# Patient Record
Sex: MALE | Race: WHITE | NOT HISPANIC OR LATINO
[De-identification: names, ages, dates, MRNs, and addresses within clinical notes are randomized per-mention and may not be internally consistent; named-entity substitution may affect disease eponyms.]

---

## 2017-01-12 ENCOUNTER — APPOINTMENT (OUTPATIENT)
Dept: PAIN MANAGEMENT | Facility: CLINIC | Age: 52
End: 2017-01-12

## 2017-01-12 VITALS
HEIGHT: 74 IN | DIASTOLIC BLOOD PRESSURE: 90 MMHG | WEIGHT: 200 LBS | HEART RATE: 66 BPM | SYSTOLIC BLOOD PRESSURE: 154 MMHG | BODY MASS INDEX: 25.67 KG/M2

## 2017-02-13 ENCOUNTER — APPOINTMENT (OUTPATIENT)
Dept: PAIN MANAGEMENT | Facility: CLINIC | Age: 52
End: 2017-02-13

## 2017-02-13 VITALS
HEIGHT: 74 IN | SYSTOLIC BLOOD PRESSURE: 142 MMHG | BODY MASS INDEX: 25.67 KG/M2 | HEART RATE: 73 BPM | WEIGHT: 200 LBS | DIASTOLIC BLOOD PRESSURE: 80 MMHG

## 2017-03-01 ENCOUNTER — APPOINTMENT (OUTPATIENT)
Dept: NEUROLOGY | Facility: CLINIC | Age: 52
End: 2017-03-01

## 2017-03-01 VITALS
HEIGHT: 74 IN | SYSTOLIC BLOOD PRESSURE: 143 MMHG | DIASTOLIC BLOOD PRESSURE: 80 MMHG | HEART RATE: 66 BPM | WEIGHT: 195 LBS | BODY MASS INDEX: 25.03 KG/M2

## 2017-03-28 ENCOUNTER — APPOINTMENT (OUTPATIENT)
Dept: PAIN MANAGEMENT | Facility: CLINIC | Age: 52
End: 2017-03-28

## 2017-03-28 VITALS
BODY MASS INDEX: 25.03 KG/M2 | WEIGHT: 195 LBS | DIASTOLIC BLOOD PRESSURE: 74 MMHG | HEART RATE: 64 BPM | SYSTOLIC BLOOD PRESSURE: 134 MMHG | HEIGHT: 74 IN

## 2017-03-28 RX ORDER — NABUMETONE 750 MG/1
750 TABLET, FILM COATED ORAL
Qty: 60 | Refills: 1 | Status: DISCONTINUED | COMMUNITY
Start: 2017-01-12 | End: 2017-03-28

## 2017-03-28 RX ORDER — ASPIRIN ENTERIC COATED TABLETS 81 MG 81 MG/1
81 TABLET, DELAYED RELEASE ORAL
Refills: 0 | Status: ACTIVE | COMMUNITY

## 2017-05-15 ENCOUNTER — APPOINTMENT (OUTPATIENT)
Dept: NEUROLOGY | Facility: CLINIC | Age: 52
End: 2017-05-15

## 2017-05-15 VITALS
SYSTOLIC BLOOD PRESSURE: 137 MMHG | HEART RATE: 69 BPM | WEIGHT: 195 LBS | BODY MASS INDEX: 25.03 KG/M2 | DIASTOLIC BLOOD PRESSURE: 83 MMHG | HEIGHT: 74 IN

## 2017-05-15 RX ORDER — PREGABALIN 50 MG/1
50 CAPSULE ORAL
Qty: 120 | Refills: 3 | Status: DISCONTINUED | COMMUNITY
Start: 2017-03-01 | End: 2017-05-15

## 2017-06-05 ENCOUNTER — APPOINTMENT (OUTPATIENT)
Dept: PAIN MANAGEMENT | Facility: CLINIC | Age: 52
End: 2017-06-05

## 2017-06-05 VITALS
SYSTOLIC BLOOD PRESSURE: 113 MMHG | DIASTOLIC BLOOD PRESSURE: 74 MMHG | HEIGHT: 74 IN | WEIGHT: 205 LBS | BODY MASS INDEX: 26.31 KG/M2 | HEART RATE: 73 BPM

## 2017-06-08 ENCOUNTER — OTHER (OUTPATIENT)
Age: 52
End: 2017-06-08

## 2017-07-06 ENCOUNTER — MEDICATION RENEWAL (OUTPATIENT)
Age: 52
End: 2017-07-06

## 2017-07-18 ENCOUNTER — EMERGENCY (EMERGENCY)
Facility: HOSPITAL | Age: 52
LOS: 1 days | Discharge: HOME | End: 2017-07-18
Attending: EMERGENCY MEDICINE
Payer: MEDICAID

## 2017-07-18 VITALS
HEIGHT: 74 IN | RESPIRATION RATE: 20 BRPM | HEART RATE: 93 BPM | DIASTOLIC BLOOD PRESSURE: 69 MMHG | TEMPERATURE: 99 F | WEIGHT: 199.96 LBS | SYSTOLIC BLOOD PRESSURE: 159 MMHG | OXYGEN SATURATION: 98 %

## 2017-07-18 VITALS
RESPIRATION RATE: 18 BRPM | OXYGEN SATURATION: 99 % | DIASTOLIC BLOOD PRESSURE: 76 MMHG | TEMPERATURE: 98 F | SYSTOLIC BLOOD PRESSURE: 123 MMHG

## 2017-07-18 DIAGNOSIS — F41.9 ANXIETY DISORDER, UNSPECIFIED: ICD-10-CM

## 2017-07-18 DIAGNOSIS — Z98.89 OTHER SPECIFIED POSTPROCEDURAL STATES: Chronic | ICD-10-CM

## 2017-07-18 PROCEDURE — 99284 EMERGENCY DEPT VISIT MOD MDM: CPT

## 2017-07-18 PROCEDURE — 99285 EMERGENCY DEPT VISIT HI MDM: CPT

## 2017-07-18 NOTE — ED BEHAVIORAL HEALTH ASSESSMENT NOTE - RISK ASSESSMENT
low overall acute risk. while mother's illness and ongoing occupational stressors exist, pt is future oriented, in treatment with a therapist, denies suicidal ideation/ homicidal ideation, no prior attempts, no substance use

## 2017-07-18 NOTE — ED PROVIDER NOTE - MEDICAL DECISION MAKING DETAILS
Dr. Andrade (Attending Physician)  patient. with ho anxiety pw anxiety about mothers recent pancreatic ca diagnosis. denies si, hi.  decreased sleep recently. no drug or etoh abuse.  Will consult psych and reassess.

## 2017-07-18 NOTE — ED ADULT NURSE NOTE - CHPI ED SYMPTOMS NEG
no confusion/no homicidal/no change in level of consciousness/no hallucinations/no weakness/no disorientation/no agitation/no weight loss/no paranoia/no suicidal
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
## 2017-07-18 NOTE — ED PROVIDER NOTE - ATTENDING CONTRIBUTION TO CARE
Dr. Andrade (Attending Physician)  I performed a history and physical exam of the patient and discussed their management with the advanced care provider. I reviewed the advanced care provider's note and agree with the documented findings and plan of care. My medical decision making and objective findings are found above.

## 2017-07-18 NOTE — ED PROVIDER NOTE - PSYCHIATRIC, MLM
Alert and oriented to person, place, time/situation. normal mood and affect. no apparent risk to self or others.  Pt becomes upset when he speaks about his mother and cries.

## 2017-07-18 NOTE — ED ADULT NURSE NOTE - PMH
Benign Hypertension    Cervical disc disease with myelopathy    Cervical disc disorder with radiculopathy    Cervical disc displacement    Depressive disorder, not elsewhere classified    Diabetes    Hypercholesteremia    Seizure  5 years  Type II or unspecified type diabetes mellitus without mention of complication, not stated as uncontr

## 2017-07-18 NOTE — ED PROVIDER NOTE - OBJECTIVE STATEMENT
51 y.o. male coming in with feeling upset that his mother has recently been diagnosed with pancreatic cancer.  No primary psych diagnosis, no hospitalizations for psych issues in the past.  History of a seizure disorder, back and neck pain issues but otherwise healthy.  No SI, no HI, no hallucinations.

## 2017-07-18 NOTE — ED BEHAVIORAL HEALTH ASSESSMENT NOTE - SUICIDE PROTECTIVE FACTORS
Positive therapeutic relationships/Responsibility to family and others/Identifies reasons for living/Supportive social network or family/Future oriented

## 2017-07-18 NOTE — ED BEHAVIORAL HEALTH ASSESSMENT NOTE - DESCRIPTION
Benign Hypertension, Cervical disk disease, Seizure disorder, DM calm and cooperative. very polite. no events. see hpi

## 2017-07-18 NOTE — ED BEHAVIORAL HEALTH ASSESSMENT NOTE - SUMMARY
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   on exam, while anxious, pt is not presenting as depressed. he is without any suicidal ideations, intent or plan; he is not homicidal, manic, or psychotic. he appears to be exhibiting an appropriate response to current stressors. pt is not an acute danger to self or others, and does not require inpatient hospitalization at this time.

## 2017-07-18 NOTE — ED PROVIDER NOTE - PROGRESS NOTE DETAILS
Called psych resident for consult, explained the case and he does not believe this is an emergent psych case.  BS

## 2017-07-18 NOTE — ED ADULT NURSE NOTE - OBJECTIVE STATEMENT
Patient brought in by ems after patient called worried about having a seizure. Patient has a history of seizure disorder and dm  Patient has been calm and in good control Patient denies an si hi . Patient has no past psych admissions Patient wanded by security and put into gown  All valuable taken by staff Wating to be seen by medicine.Patient reports increased anxiety because mom is sick

## 2017-07-27 ENCOUNTER — APPOINTMENT (OUTPATIENT)
Dept: NEUROLOGY | Facility: CLINIC | Age: 52
End: 2017-07-27

## 2017-08-07 ENCOUNTER — APPOINTMENT (OUTPATIENT)
Dept: NEUROLOGY | Facility: CLINIC | Age: 52
End: 2017-08-07
Payer: MEDICAID

## 2017-08-07 VITALS
HEIGHT: 74 IN | DIASTOLIC BLOOD PRESSURE: 68 MMHG | WEIGHT: 205 LBS | BODY MASS INDEX: 26.31 KG/M2 | SYSTOLIC BLOOD PRESSURE: 126 MMHG | HEART RATE: 60 BPM

## 2017-08-07 PROCEDURE — 99214 OFFICE O/P EST MOD 30 MIN: CPT

## 2017-08-07 RX ORDER — LAMOTRIGINE 25 MG/1
25 TABLET ORAL
Qty: 60 | Refills: 1 | Status: DISCONTINUED | COMMUNITY
Start: 2017-05-15 | End: 2017-08-07

## 2017-08-10 ENCOUNTER — APPOINTMENT (OUTPATIENT)
Dept: NEUROLOGY | Facility: CLINIC | Age: 52
End: 2017-08-10

## 2017-08-17 ENCOUNTER — OTHER (OUTPATIENT)
Age: 52
End: 2017-08-17

## 2017-08-25 ENCOUNTER — APPOINTMENT (OUTPATIENT)
Dept: PAIN MANAGEMENT | Facility: CLINIC | Age: 52
End: 2017-08-25

## 2017-09-14 ENCOUNTER — APPOINTMENT (OUTPATIENT)
Dept: NEUROLOGY | Facility: CLINIC | Age: 52
End: 2017-09-14

## 2017-11-07 ENCOUNTER — APPOINTMENT (OUTPATIENT)
Dept: NEUROLOGY | Facility: CLINIC | Age: 52
End: 2017-11-07

## 2018-01-02 ENCOUNTER — MEDICATION RENEWAL (OUTPATIENT)
Age: 53
End: 2018-01-02

## 2018-01-05 ENCOUNTER — APPOINTMENT (OUTPATIENT)
Dept: NEUROLOGY | Facility: CLINIC | Age: 53
End: 2018-01-05

## 2018-01-19 ENCOUNTER — APPOINTMENT (OUTPATIENT)
Dept: NEUROLOGY | Facility: CLINIC | Age: 53
End: 2018-01-19
Payer: MEDICAID

## 2018-01-19 VITALS — DIASTOLIC BLOOD PRESSURE: 89 MMHG | HEART RATE: 66 BPM | SYSTOLIC BLOOD PRESSURE: 168 MMHG | HEIGHT: 74 IN

## 2018-01-19 VITALS — SYSTOLIC BLOOD PRESSURE: 168 MMHG | DIASTOLIC BLOOD PRESSURE: 89 MMHG | HEART RATE: 66 BPM

## 2018-01-19 PROCEDURE — 99215 OFFICE O/P EST HI 40 MIN: CPT

## 2018-01-25 ENCOUNTER — OTHER (OUTPATIENT)
Age: 53
End: 2018-01-25

## 2018-02-01 ENCOUNTER — OTHER (OUTPATIENT)
Age: 53
End: 2018-02-01

## 2018-03-01 ENCOUNTER — APPOINTMENT (OUTPATIENT)
Dept: NEUROLOGY | Facility: CLINIC | Age: 53
End: 2018-03-01
Payer: MEDICAID

## 2018-03-01 PROCEDURE — 95886 MUSC TEST DONE W/N TEST COMP: CPT | Mod: 59

## 2018-03-01 PROCEDURE — 95910 NRV CNDJ TEST 7-8 STUDIES: CPT

## 2018-03-15 ENCOUNTER — OTHER (OUTPATIENT)
Age: 53
End: 2018-03-15

## 2018-03-18 ENCOUNTER — EMERGENCY (EMERGENCY)
Facility: HOSPITAL | Age: 53
LOS: 1 days | Discharge: HOME | End: 2018-03-18
Attending: EMERGENCY MEDICINE | Admitting: EMERGENCY MEDICINE
Payer: MEDICAID

## 2018-03-18 VITALS
HEART RATE: 88 BPM | SYSTOLIC BLOOD PRESSURE: 131 MMHG | OXYGEN SATURATION: 99 % | RESPIRATION RATE: 18 BRPM | DIASTOLIC BLOOD PRESSURE: 86 MMHG

## 2018-03-18 VITALS
HEIGHT: 74 IN | DIASTOLIC BLOOD PRESSURE: 88 MMHG | WEIGHT: 205.03 LBS | RESPIRATION RATE: 18 BRPM | TEMPERATURE: 99 F | SYSTOLIC BLOOD PRESSURE: 134 MMHG | OXYGEN SATURATION: 98 % | HEART RATE: 93 BPM

## 2018-03-18 DIAGNOSIS — Z98.89 OTHER SPECIFIED POSTPROCEDURAL STATES: Chronic | ICD-10-CM

## 2018-03-18 PROCEDURE — 99283 EMERGENCY DEPT VISIT LOW MDM: CPT | Mod: 25

## 2018-03-18 PROCEDURE — 96372 THER/PROPH/DIAG INJ SC/IM: CPT

## 2018-03-18 PROCEDURE — 99283 EMERGENCY DEPT VISIT LOW MDM: CPT

## 2018-03-18 RX ORDER — CYCLOBENZAPRINE HYDROCHLORIDE 10 MG/1
10 TABLET, FILM COATED ORAL ONCE
Qty: 0 | Refills: 0 | Status: COMPLETED | OUTPATIENT
Start: 2018-03-18 | End: 2018-03-18

## 2018-03-18 RX ORDER — CYCLOBENZAPRINE HYDROCHLORIDE 10 MG/1
1 TABLET, FILM COATED ORAL
Qty: 15 | Refills: 0
Start: 2018-03-18 | End: 2018-03-22

## 2018-03-18 RX ORDER — IBUPROFEN 200 MG
600 TABLET ORAL ONCE
Qty: 0 | Refills: 0 | Status: DISCONTINUED | OUTPATIENT
Start: 2018-03-18 | End: 2018-03-18

## 2018-03-18 RX ORDER — KETOROLAC TROMETHAMINE 30 MG/ML
30 SYRINGE (ML) INJECTION ONCE
Qty: 0 | Refills: 0 | Status: DISCONTINUED | OUTPATIENT
Start: 2018-03-18 | End: 2018-03-18

## 2018-03-18 RX ORDER — GABAPENTIN 400 MG/1
100 CAPSULE ORAL ONCE
Qty: 0 | Refills: 0 | Status: DISCONTINUED | OUTPATIENT
Start: 2018-03-18 | End: 2018-03-18

## 2018-03-18 RX ADMIN — Medication 30 MILLIGRAM(S): at 20:03

## 2018-03-18 RX ADMIN — CYCLOBENZAPRINE HYDROCHLORIDE 10 MILLIGRAM(S): 10 TABLET, FILM COATED ORAL at 20:45

## 2018-03-18 NOTE — ED ADULT NURSE REASSESSMENT NOTE - NS ED NURSE REASSESS COMMENT FT1
Received report from DAGMAR Benton. Pt lying on assigned stretcher shows no signs of any distress, VS WNL. Safety maintained & continue monitor.

## 2018-03-18 NOTE — ED PROVIDER NOTE - ATTENDING CONTRIBUTION TO CARE
ATTENDING MD: I, Chris Valverde, have seen and evaluated this patient with the advance practice clinician.  I have discussed the history, exam ,and aspects of care with the APC.  I have reviewed the APC's note. I agree with the findings  unless other wise stated in the HPI, PE, MDM, and progress notes.     VITALS: reviewed  GEN: NAD, A & O x 4  HEAD/EYES: NCAT, PERRL, EOMI, anicteric sclerae, no conjunctival pallor  ENT: mucus membranes moist, oropharynx WNL, trachea midline, no JVD  RESP: lungs CTA with equal breath sounds bilaterally, chest wall nontender and atraumatic  CV: heart with reg rhythm S1, S2, no murmur; distal pulses intact and symmetric bilaterally  ABDOMEN: normoactive bowel sounds, soft, nondistended, nontender, no palpable masses  : no CVAT  MSK: extremities atraumatic and nontender, no edema, no assymetry. the back is without midline or lateral tenderness, there is no spinal deformity or stepoff and the back is ranged painlessly. the neck has no midline tenderness, deformity, or stepoff, and is ranged painlessly. + trapezius and paraspinous muscular tenderness, negative spurling.  SKIN: warm, dry, no rash, no bruising, no cyanosis. color appropriate for ethnicity  NEURO: Normal mentation, GCS15, CNII-XII are intact, strength is 5/5 throughout upper and lower extremities symmetric bilaterally, Sensation is intact to light touch throughout trunk and extremities symmetric bilaterally, Cerebellar function is intact by finger-nose-finger, rapid alternating movements and heel-to-shin, 2+DTRs symmetric bilaterally in UEs and LEs, Normal Gait, babinski dowgoing and negative travis's bilaterally   PSYCH: Affect appropriate     MDM: pt presents with acute on chronic neck pain. some neuropathic component. no red flags to require emergent imaging though suspect worsening of underlying disease. pt is reasonable and has appropriate expectations. does not tolerate gabapentin. will start flexeril for dual purpose of muscle relaxation and tricyclic-like properties may treat neuropathic pain. understands to f/u with spine and reasons to come back to ED. pt's pain improved with treatment, will DC w/f/u.
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
## 2018-03-18 NOTE — ED ADULT NURSE NOTE - OBJECTIVE STATEMENT
52yr male presented to ED c/o neck pain.  Pt has hx of chronic neck & back pain. Pt presents a&ox4 reporting neck pain radiating to shoulder blades, also reporting intermittent bilateral numbness to hands.  Pt reports falling 2 years ago which started the chronic neck and back pain. Pt is well in appearance, able to ambulate without assistance, denies blurry vision or double vision, no n/v/d, neuro assessment noted no deficits, no ha, equal strength bilateral to arms and legs, skin warm dry & intact.

## 2018-03-18 NOTE — ED PROVIDER NOTE - CARE PLAN
Principal Discharge DX:	Neck pain Principal Discharge DX:	Neck pain  Secondary Diagnosis:	Neuropathic pain  Secondary Diagnosis:	Cervical disc disorder with radiculopathy

## 2018-03-18 NOTE — ED ADULT NURSE NOTE - CHPI ED SYMPTOMS NEG
no change in level of consciousness/no blurred vision/no confusion/no loss of consciousness/no nausea

## 2018-03-18 NOTE — ED PROVIDER NOTE - PHYSICAL EXAMINATION
NAD, VSS, Afebrile, + PERRL, No facial or scalp swelling or tender, B/L cervical trapezium tender without swelling or lesion, no Lymphadenopathy, + mid lumbar and para lumbar tender without sciatica tender, Full ROMs of hips, Neuro- intact with steady gait. NAD, VSS, Afebrile, + PERRL, No facial or scalp swelling or tender, B/L cervical trapezium tender without swelling or lesion, no Lymphadenopathy, B/L para thoracic tender, + mid lumbar and para lumbar tender without sciatica tender, Full ROMs of hips, Neuro- intact with steady gait.

## 2018-03-26 ENCOUNTER — APPOINTMENT (OUTPATIENT)
Dept: SPINE | Facility: CLINIC | Age: 53
End: 2018-03-26
Payer: MEDICAID

## 2018-03-26 VITALS
BODY MASS INDEX: 26.31 KG/M2 | SYSTOLIC BLOOD PRESSURE: 162 MMHG | HEIGHT: 74 IN | DIASTOLIC BLOOD PRESSURE: 86 MMHG | HEART RATE: 74 BPM | WEIGHT: 205 LBS

## 2018-03-26 PROCEDURE — 99205 OFFICE O/P NEW HI 60 MIN: CPT

## 2018-03-26 RX ORDER — NABUMETONE 750 MG/1
750 TABLET, FILM COATED ORAL TWICE DAILY
Qty: 60 | Refills: 1 | Status: COMPLETED | COMMUNITY
Start: 2017-01-13 | End: 2018-03-26

## 2018-03-26 RX ORDER — TIZANIDINE 4 MG/1
4 TABLET ORAL TWICE DAILY
Qty: 60 | Refills: 1 | Status: COMPLETED | COMMUNITY
Start: 2017-06-05 | End: 2018-03-26

## 2018-04-11 ENCOUNTER — APPOINTMENT (OUTPATIENT)
Dept: NEUROLOGY | Facility: CLINIC | Age: 53
End: 2018-04-11
Payer: MEDICAID

## 2018-04-11 VITALS
WEIGHT: 205 LBS | HEIGHT: 74 IN | BODY MASS INDEX: 26.31 KG/M2 | HEART RATE: 77 BPM | DIASTOLIC BLOOD PRESSURE: 79 MMHG | SYSTOLIC BLOOD PRESSURE: 138 MMHG

## 2018-04-11 PROCEDURE — 99215 OFFICE O/P EST HI 40 MIN: CPT

## 2018-04-16 ENCOUNTER — APPOINTMENT (OUTPATIENT)
Dept: SPINE | Facility: CLINIC | Age: 53
End: 2018-04-16
Payer: MEDICAID

## 2018-04-16 VITALS
HEIGHT: 74 IN | SYSTOLIC BLOOD PRESSURE: 140 MMHG | HEART RATE: 74 BPM | WEIGHT: 205 LBS | BODY MASS INDEX: 26.31 KG/M2 | DIASTOLIC BLOOD PRESSURE: 77 MMHG

## 2018-04-16 DIAGNOSIS — M48.00 SPINAL STENOSIS, SITE UNSPECIFIED: ICD-10-CM

## 2018-04-16 PROCEDURE — 99213 OFFICE O/P EST LOW 20 MIN: CPT

## 2018-08-07 ENCOUNTER — TRANSCRIPTION ENCOUNTER (OUTPATIENT)
Age: 53
End: 2018-08-07

## 2018-08-20 ENCOUNTER — APPOINTMENT (OUTPATIENT)
Dept: NEUROLOGY | Facility: CLINIC | Age: 53
End: 2018-08-20
Payer: MEDICAID

## 2018-08-20 VITALS
HEIGHT: 74 IN | SYSTOLIC BLOOD PRESSURE: 180 MMHG | HEART RATE: 78 BPM | WEIGHT: 210 LBS | BODY MASS INDEX: 26.95 KG/M2 | DIASTOLIC BLOOD PRESSURE: 79 MMHG

## 2018-08-20 PROCEDURE — 99213 OFFICE O/P EST LOW 20 MIN: CPT

## 2018-12-12 ENCOUNTER — APPOINTMENT (OUTPATIENT)
Dept: NEUROLOGY | Facility: CLINIC | Age: 53
End: 2018-12-12
Payer: MEDICAID

## 2018-12-12 PROCEDURE — 99215 OFFICE O/P EST HI 40 MIN: CPT

## 2018-12-12 PROCEDURE — 95819 EEG AWAKE AND ASLEEP: CPT

## 2018-12-12 RX ORDER — LAMOTRIGINE 150 MG/1
150 TABLET ORAL TWICE DAILY
Qty: 60 | Refills: 3 | Status: DISCONTINUED | COMMUNITY
Start: 2017-07-06 | End: 2018-12-12

## 2018-12-24 ENCOUNTER — OTHER (OUTPATIENT)
Age: 53
End: 2018-12-24

## 2018-12-29 ENCOUNTER — RX RENEWAL (OUTPATIENT)
Age: 53
End: 2018-12-29

## 2019-02-05 LAB
BASOPHILS # BLD AUTO: 0.03 K/UL
BASOPHILS NFR BLD AUTO: 0.3 %
EOSINOPHIL # BLD AUTO: 0.09 K/UL
EOSINOPHIL NFR BLD AUTO: 1 %
HCT VFR BLD CALC: 45.2 %
HGB BLD-MCNC: 14.7 G/DL
IMM GRANULOCYTES NFR BLD AUTO: 0.1 %
LYMPHOCYTES # BLD AUTO: 3.18 K/UL
LYMPHOCYTES NFR BLD AUTO: 37 %
MAN DIFF?: NORMAL
MCHC RBC-ENTMCNC: 30.6 PG
MCHC RBC-ENTMCNC: 32.5 GM/DL
MCV RBC AUTO: 94.2 FL
MONOCYTES # BLD AUTO: 0.56 K/UL
MONOCYTES NFR BLD AUTO: 6.5 %
NEUTROPHILS # BLD AUTO: 4.72 K/UL
NEUTROPHILS NFR BLD AUTO: 55.1 %
PLATELET # BLD AUTO: 236 K/UL
RBC # BLD: 4.8 M/UL
RBC # FLD: 13.8 %
WBC # FLD AUTO: 8.59 K/UL

## 2019-02-06 LAB
ALBUMIN SERPL ELPH-MCNC: 4.7 G/DL
ALP BLD-CCNC: 55 U/L
ALT SERPL-CCNC: 24 U/L
ANION GAP SERPL CALC-SCNC: 14 MMOL/L
AST SERPL-CCNC: 20 U/L
BILIRUB DIRECT SERPL-MCNC: 0.1 MG/DL
BILIRUB INDIRECT SERPL-MCNC: 0.3 MG/DL
BILIRUB SERPL-MCNC: 0.4 MG/DL
BUN SERPL-MCNC: 20 MG/DL
CALCIUM SERPL-MCNC: 9.8 MG/DL
CHLORIDE SERPL-SCNC: 99 MMOL/L
CO2 SERPL-SCNC: 24 MMOL/L
CREAT SERPL-MCNC: 1.09 MG/DL
GLUCOSE SERPL-MCNC: 180 MG/DL
LAMOTRIGINE SERPL-MCNC: 6.2 MCG/ML
POTASSIUM SERPL-SCNC: 4.4 MMOL/L
PROT SERPL-MCNC: 7.5 G/DL
SODIUM SERPL-SCNC: 137 MMOL/L

## 2019-02-11 ENCOUNTER — APPOINTMENT (OUTPATIENT)
Dept: NEUROLOGY | Facility: CLINIC | Age: 54
End: 2019-02-11
Payer: MEDICAID

## 2019-02-11 VITALS
WEIGHT: 215 LBS | DIASTOLIC BLOOD PRESSURE: 79 MMHG | HEIGHT: 74 IN | HEART RATE: 69 BPM | SYSTOLIC BLOOD PRESSURE: 159 MMHG | BODY MASS INDEX: 27.59 KG/M2

## 2019-02-11 PROCEDURE — 99214 OFFICE O/P EST MOD 30 MIN: CPT

## 2019-02-11 NOTE — DATA REVIEWED
[de-identified] : 2007 MRI neg\par 1/2017: MRI neg (OSH)\par 2/2018: MRI C spine: C3-5 C7 canal stenosis, compression [de-identified] : 2007, 2009, 2011 AEEG 48hr neg\par The patient had ambulatory EEG monitoring in April 2016.  This has shown a left temporal seizure on the random one day recording.   [de-identified] : EMG 3/2018: CTS bilat R>L, ulnar neuropathy on the L

## 2019-02-11 NOTE — REVIEW OF SYSTEMS
[Feeling Tired] : feeling tired [Anxiety] : anxiety [Depression] : depression [Confused or Disoriented] : confusion [Memory Lapses or Loss] : memory loss [Decr. Concentrating Ability] : decreased concentrating ability [Difficulty with Language] : ~M difficulty with language [Changed Thought Patterns] : changed thought patterns [Numbness] : numbness [Tingling] : tingling [Abnormal Sensation] : an abnormal sensation [Seizures] : convulsions [Palpitations] : palpitations [As Noted in HPI] : as noted in HPI [Abdominal Pain] : abdominal pain [Negative] : Heme/Lymph

## 2019-02-11 NOTE — CONSULT LETTER
[Dear  ___] : Dear  [unfilled], [Consult Letter:] : I had the pleasure of evaluating your patient, [unfilled]. [( Thank you for referring [unfilled] for consultation for _____ )] : Thank you for referring [unfilled] for consultation for [unfilled] [Please see my note below.] : Please see my note below. [Consult Closing:] : Thank you very much for allowing me to participate in the care of this patient.  If you have any questions, please do not hesitate to contact me. [Sincerely,] : Sincerely, [Santo Boland MD] : Santo Boland MD [Attending, Dept. of Neurology, Division of Epilepsy] : Attending, Dept. of Neurology, Division of Epilepsy [[facility]] : [facility] [ of Neurology] :  of Neurology [FreeTextEntry2] : Dr. Peter Farrell\par [address] [place] [address]

## 2019-02-11 NOTE — PHYSICAL EXAM
[General Appearance - Alert] : alert [General Appearance - In No Acute Distress] : in no acute distress [Oriented To Time, Place, And Person] : oriented to person, place, and time [Impaired Insight] : insight and judgment were intact [Affect] : the affect was normal [Person] : oriented to person [Place] : oriented to place [Time] : oriented to time [Concentration Intact] : normal concentrating ability [Visual Intact] : visual attention was ~T not ~L decreased [Naming Objects] : no difficulty naming common objects [Repeating Phrases] : no difficulty repeating a phrase [Writing A Sentence] : no difficulty writing a sentence [Fluency] : fluency intact [Comprehension] : comprehension intact [Reading] : reading intact [Past History] : adequate knowledge of personal past history [Cranial Nerves Optic (II)] : visual acuity intact bilaterally,  visual fields full to confrontation, pupils equal round and reactive to light [Cranial Nerves Oculomotor (III)] : extraocular motion intact [Cranial Nerves Trigeminal (V)] : facial sensation intact symmetrically [Cranial Nerves Facial (VII)] : face symmetrical [Cranial Nerves Vestibulocochlear (VIII)] : hearing was intact bilaterally [Cranial Nerves Glossopharyngeal (IX)] : tongue and palate midline [Cranial Nerves Accessory (XI - Cranial And Spinal)] : head turning and shoulder shrug symmetric [Cranial Nerves Hypoglossal (XII)] : there was no tongue deviation with protrusion [Motor Tone] : muscle tone was normal in all four extremities [Motor Strength] : muscle strength was normal in all four extremities [No Muscle Atrophy] : normal bulk in all four extremities [Motor Handedness Right-Handed] : the patient is right hand dominant [Sensation Tactile Decrease] : light touch was intact [Abnormal Walk] : normal gait [Balance] : balance was intact [3+] : Patella left 3+ [2+] : Ankle jerk left 2+ [Sclera] : the sclera and conjunctiva were normal [Outer Ear] : the ears and nose were normal in appearance [Neck Appearance] : the appearance of the neck was normal [Apical Impulse] : the apical impulse was normal [Heart Rate And Rhythm] : heart rate was normal and rhythm regular [Abdomen Tenderness] : non-tender [Nail Clubbing] : no clubbing  or cyanosis of the fingernails [] : no rash [Skin Lesions] : no skin lesions [Past-pointing] : there was no past-pointing [Tremor] : no tremor present [Plantar Reflex Right Only] : normal on the right [Plantar Reflex Left Only] : normal on the left [FreeTextEntry4] : easily excitable, pacing during interview

## 2019-02-11 NOTE — DISCUSSION/SUMMARY
[Medically Refractory (seizure within the last year)] : Medically Refractory (seizure within the last year) [Risks Associated with Driving/[place] Law] : As per my usual protocol, the patient was advised in regards to risks and driving privileges associated with the [place] Guidelines.  [Safety Recommendations] : The patient was advised in regards to the risk of seizures and general seizure safety recommendations including not to be bathing alone, climbing to high places and operating heavy machinery. [Compliance with Medications] : The importance of compliance with medications was reinforced. [Medication Side Effects] : High frequency and serious potential medication adverse effects were reviewed with the patient, including but not exclusive to psychiatric effects.  Information sheets on medication side effects were made available to the patient in our clinic.  The patient or advocate agrees to notify us for any concerns. [Sleep Hygiene/Sleep Disruption Risks] : Sleep hygiene and the risks of sleep disruption were discussed. [Obtain Copies of Medical Records] : Patient was asked to obtain copies of pertinent prior medical records or studies [Mental Health Evaluation] : Mental health evaluation” was recommended with either our  and psychologist, at [facility] ([facility] ): [phone], or [facility] Intake: [phone] [FreeTextEntry1] : Seizures since 2007 baseline was q1-5months, Likely underreported. 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  History of poor response to OXC, poor tolerability of VPA, PGB.  \par Prior NL MRI.\par Stress, anxiety, mood issues, poor med tolerability, poor insight a factor barriers to his own care.\par Chronic pain issues.  Sleep deprivation, insomnia an issue. Poor appetite.\par Memory complaints.\par \par We previously discussed that he should be on AED therapy despite his reluctance.  Discussed risk benefit >risk of AEDs in general.  \par On LTG (caution with sleep issues), level 6.2 in 2/2019, inc to 225mg bid.\par Future options of higher dose LTG, vimpat, ZNS, TPM.  \par Pt reluctant to pursue surgical options despite meds continuing to fail.\par F/u LTG level\par \par -Needs a psychiatrist, psychologist. saw LICSW.  Consider AD/SSRI (pt reluctant).  Stress reduction,  exercise.\par -Referred to sleep specialist in past.  Discussed sleep aide (tried ambien in the past)\par -Physical therapy\par \par -MRI c-spine showing stenosis, f/u with Nsurg Dr Cortes.\par -EMG for sensory disturbance of hands neuropathy: CTS: wear wrist guards at night\par -\par voluntarily gave up driving
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
## 2019-02-13 ENCOUNTER — OTHER (OUTPATIENT)
Age: 54
End: 2019-02-13

## 2019-02-19 ENCOUNTER — OTHER (OUTPATIENT)
Age: 54
End: 2019-02-19

## 2019-02-25 ENCOUNTER — MOBILE ON CALL (OUTPATIENT)
Age: 54
End: 2019-02-25

## 2019-02-27 ENCOUNTER — MOBILE ON CALL (OUTPATIENT)
Age: 54
End: 2019-02-27

## 2019-03-16 ENCOUNTER — EMERGENCY (EMERGENCY)
Facility: HOSPITAL | Age: 54
LOS: 1 days | Discharge: HOME | End: 2019-03-16
Attending: EMERGENCY MEDICINE
Payer: MEDICAID

## 2019-03-16 VITALS
DIASTOLIC BLOOD PRESSURE: 80 MMHG | RESPIRATION RATE: 18 BRPM | TEMPERATURE: 99 F | HEART RATE: 97 BPM | SYSTOLIC BLOOD PRESSURE: 179 MMHG | OXYGEN SATURATION: 99 %

## 2019-03-16 DIAGNOSIS — Z98.89 OTHER SPECIFIED POSTPROCEDURAL STATES: Chronic | ICD-10-CM

## 2019-03-16 PROCEDURE — 93010 ELECTROCARDIOGRAM REPORT: CPT

## 2019-03-16 PROCEDURE — 99284 EMERGENCY DEPT VISIT MOD MDM: CPT | Mod: 25

## 2019-03-17 VITALS
DIASTOLIC BLOOD PRESSURE: 74 MMHG | TEMPERATURE: 98 F | OXYGEN SATURATION: 97 % | RESPIRATION RATE: 18 BRPM | SYSTOLIC BLOOD PRESSURE: 146 MMHG | HEART RATE: 80 BPM

## 2019-03-17 LAB
ANION GAP SERPL CALC-SCNC: 15 MMOL/L — SIGNIFICANT CHANGE UP (ref 5–17)
BASOPHILS # BLD AUTO: 0.1 K/UL — SIGNIFICANT CHANGE UP (ref 0–0.2)
BASOPHILS NFR BLD AUTO: 0.8 % — SIGNIFICANT CHANGE UP (ref 0–2)
BUN SERPL-MCNC: 27 MG/DL — HIGH (ref 7–23)
CALCIUM SERPL-MCNC: 10.4 MG/DL — SIGNIFICANT CHANGE UP (ref 8.4–10.5)
CHLORIDE SERPL-SCNC: 100 MMOL/L — SIGNIFICANT CHANGE UP (ref 96–108)
CO2 SERPL-SCNC: 26 MMOL/L — SIGNIFICANT CHANGE UP (ref 22–31)
CREAT SERPL-MCNC: 1.09 MG/DL — SIGNIFICANT CHANGE UP (ref 0.5–1.3)
EOSINOPHIL # BLD AUTO: 0.2 K/UL — SIGNIFICANT CHANGE UP (ref 0–0.5)
EOSINOPHIL NFR BLD AUTO: 1.8 % — SIGNIFICANT CHANGE UP (ref 0–6)
GLUCOSE SERPL-MCNC: 230 MG/DL — HIGH (ref 70–99)
HCT VFR BLD CALC: 38.9 % — LOW (ref 39–50)
HGB BLD-MCNC: 13.4 G/DL — SIGNIFICANT CHANGE UP (ref 13–17)
LYMPHOCYTES # BLD AUTO: 3.6 K/UL — HIGH (ref 1–3.3)
LYMPHOCYTES # BLD AUTO: 35.1 % — SIGNIFICANT CHANGE UP (ref 13–44)
MCHC RBC-ENTMCNC: 31.6 PG — SIGNIFICANT CHANGE UP (ref 27–34)
MCHC RBC-ENTMCNC: 34.5 GM/DL — SIGNIFICANT CHANGE UP (ref 32–36)
MCV RBC AUTO: 91.7 FL — SIGNIFICANT CHANGE UP (ref 80–100)
MONOCYTES # BLD AUTO: 0.5 K/UL — SIGNIFICANT CHANGE UP (ref 0–0.9)
MONOCYTES NFR BLD AUTO: 5.2 % — SIGNIFICANT CHANGE UP (ref 2–14)
NEUTROPHILS # BLD AUTO: 5.8 K/UL — SIGNIFICANT CHANGE UP (ref 1.8–7.4)
NEUTROPHILS NFR BLD AUTO: 57.1 % — SIGNIFICANT CHANGE UP (ref 43–77)
PLATELET # BLD AUTO: 179 K/UL — SIGNIFICANT CHANGE UP (ref 150–400)
POTASSIUM SERPL-MCNC: 4.3 MMOL/L — SIGNIFICANT CHANGE UP (ref 3.5–5.3)
POTASSIUM SERPL-SCNC: 4.3 MMOL/L — SIGNIFICANT CHANGE UP (ref 3.5–5.3)
RBC # BLD: 4.24 M/UL — SIGNIFICANT CHANGE UP (ref 4.2–5.8)
RBC # FLD: 12.1 % — SIGNIFICANT CHANGE UP (ref 10.3–14.5)
SODIUM SERPL-SCNC: 141 MMOL/L — SIGNIFICANT CHANGE UP (ref 135–145)
WBC # BLD: 10.2 K/UL — SIGNIFICANT CHANGE UP (ref 3.8–10.5)
WBC # FLD AUTO: 10.2 K/UL — SIGNIFICANT CHANGE UP (ref 3.8–10.5)

## 2019-03-17 PROCEDURE — 80048 BASIC METABOLIC PNL TOTAL CA: CPT

## 2019-03-17 PROCEDURE — 93005 ELECTROCARDIOGRAM TRACING: CPT | Mod: 76

## 2019-03-17 PROCEDURE — 99284 EMERGENCY DEPT VISIT MOD MDM: CPT | Mod: 25

## 2019-03-17 PROCEDURE — 85027 COMPLETE CBC AUTOMATED: CPT

## 2019-03-17 RX ORDER — SODIUM CHLORIDE 9 MG/ML
1000 INJECTION INTRAMUSCULAR; INTRAVENOUS; SUBCUTANEOUS ONCE
Qty: 0 | Refills: 0 | Status: COMPLETED | OUTPATIENT
Start: 2019-03-17 | End: 2019-03-17

## 2019-03-17 RX ADMIN — SODIUM CHLORIDE 2000 MILLILITER(S): 9 INJECTION INTRAMUSCULAR; INTRAVENOUS; SUBCUTANEOUS at 00:15

## 2019-03-17 NOTE — ED PROVIDER NOTE - ATTENDING CONTRIBUTION TO CARE
Kia Murillo MD - Attending Physician: I have personally seen and examined this patient with the resident/fellow.  I have fully participated in the care of this patient. I have reviewed all pertinent clinical information, including history, physical exam, plan and the Resident/Fellow’s note and agree except as noted. See MDM

## 2019-03-17 NOTE — ED ADULT NURSE NOTE - OBJECTIVE STATEMENT
54y/o male presented to the ED from home via EMS with complaint of seizure disorder. A&Ox3, ambulatory. PMH- Seizure disorder, on Lamictal. Patient states that he is taking 240mg Lamictal 2x per day. Patient states that he ran out of medication this evening and took multiple pills of a lower dose Lamictal make up for the miss dose. Patient states that after he took the medication he began to "feel Drunk" with Lb/l leg weakness. Patient then called EMS concerned that the may have taken too much medication. Patient denies chest pain, palpitations, fever, chills, N/V/D. Patient placed on cardiac monitor, EKG performed.

## 2019-03-17 NOTE — ED PROVIDER NOTE - OBJECTIVE STATEMENT
53yom w/ seizures on lamictal usually takes a 200mg and a 25mg tablet for a total dose of 225 BID, ran out of 200mg tablets so he took 9 25mg tablets instead. Shortly afterwards felt wobbly and lightheaded, thought he might have overdosed or took the wrong medication. Feels better now. Ingestion was approx 2-3 hours prior to ED arrival. No other substance abuse. 53yom w/ seizures on lamictal usually takes a 200mg and a 25mg tablet for a total dose of 225 BID, ran out of 200mg tablets so he took 9 of the 25mg tablets instead. Shortly afterwards felt wobbly and lightheaded, thought he might have overdosed or took the wrong medication. Feels better now. Ingestion was approx 2-3 hours prior to ED arrival. No other substance abuse.

## 2019-03-17 NOTE — ED PROVIDER NOTE - CLINICAL SUMMARY MEDICAL DECISION MAKING FREE TEXT BOX
Kia Murillo MD - Attending Physician: Pt took multiple smaller dose tablets instead of full dose tablet of lamictal given ran out. Same appropriate dose. Felt jittery. Exam wnl. Ekg, IVF, obs for likeyl dc

## 2019-03-17 NOTE — ED PROVIDER NOTE - NSFOLLOWUPINSTRUCTIONS_ED_ALL_ED_FT
Follow up with your primary doctor for medication refill. Return to the ER for any new or worsening symptoms.

## 2019-04-23 ENCOUNTER — APPOINTMENT (OUTPATIENT)
Dept: NEUROLOGY | Facility: CLINIC | Age: 54
End: 2019-04-23

## 2019-06-07 ENCOUNTER — APPOINTMENT (OUTPATIENT)
Dept: NEUROLOGY | Facility: CLINIC | Age: 54
End: 2019-06-07

## 2019-06-18 ENCOUNTER — RX RENEWAL (OUTPATIENT)
Age: 54
End: 2019-06-18

## 2019-06-28 ENCOUNTER — EMERGENCY (EMERGENCY)
Facility: HOSPITAL | Age: 54
LOS: 1 days | Discharge: HOME | End: 2019-06-28
Attending: EMERGENCY MEDICINE
Payer: MEDICAID

## 2019-06-28 VITALS
HEART RATE: 65 BPM | DIASTOLIC BLOOD PRESSURE: 71 MMHG | SYSTOLIC BLOOD PRESSURE: 112 MMHG | OXYGEN SATURATION: 95 % | TEMPERATURE: 98 F | RESPIRATION RATE: 18 BRPM

## 2019-06-28 VITALS
TEMPERATURE: 99 F | SYSTOLIC BLOOD PRESSURE: 135 MMHG | OXYGEN SATURATION: 98 % | RESPIRATION RATE: 18 BRPM | HEART RATE: 70 BPM | WEIGHT: 214.95 LBS | DIASTOLIC BLOOD PRESSURE: 73 MMHG

## 2019-06-28 DIAGNOSIS — Z98.89 OTHER SPECIFIED POSTPROCEDURAL STATES: Chronic | ICD-10-CM

## 2019-06-28 PROCEDURE — 93005 ELECTROCARDIOGRAM TRACING: CPT

## 2019-06-28 PROCEDURE — 99283 EMERGENCY DEPT VISIT LOW MDM: CPT

## 2019-06-28 NOTE — ED ADULT NURSE NOTE - NSIMPLEMENTINTERV_GEN_ALL_ED
Implemented All Universal Safety Interventions:  [place] to call system. Call bell, personal items and telephone within reach. Instruct patient to call for assistance. Room bathroom lighting operational. Non-slip footwear when patient is off stretcher. Physically safe environment: no spills, clutter or unnecessary equipment. Stretcher in lowest position, wheels locked, appropriate side rails in place.

## 2019-06-28 NOTE — ED PROVIDER NOTE - CCCP TRG CHIEF CMPLNT
Elizabeth Liriano is a 79year old female. Patient presents with:  Altered Mental Status (neurologic)      HPI:    Nontoxic up in chair    REVIEW OF SYSTEMS:   A comprehensive 11 point review of systems was completed.   Pertinent positives and negatives no SACROILIAC JT ANESTH      Comment: Procedure: SI JOINT INJECTION;  Surgeon:                Annel Grimm MD;  Location: [address] MANAGEMENT  9/19/2012: INJ FOR SACROILIAC JT ANESTH      Comment: Procedure: SI JOINT INJECTION;  S Surgeon: Gillian Mariscal DO;  Location: [address] [place] [address]  8/29/2012: JOANNE-BART BY  PHYS PERFRMG SV 5+ YR      Comment: Procedure: SI JOINT INJECTION;  Surgeon:                Amy Arriola MD;  Location: [address] [place] [address]  Sandi Quevedo MD;  Location: [address] [place] [address] [address]a [address]                MANAGEMENT  11/29/2012: PATIENT [place] Ayla PREOPERATIVE ORDER FOR IV ANT*      Comment: Procedure: LUMBAR EPIDURAL;  Surgeon:                Reynold Young MD;  Location: [address] [place] [address]  resolving  9. Leukocytosis  -Trending down at 14k  10.   Dispo  -transition to PO cefuroxime on d/c        If you have any questions or concerns please call [facility] at [phone].                Lab Results  Component Value Date   WBC 14.6 03/11/2018   HG 5.1 mmol/L   Chloride 102 95 - 110 mmol/L   CO2 22 22 - 32 mmol/L   BUN 34 (H) 8 - 20 mg/dL   Creatinine 1.95 (H) 0.50 - 1.50 mg/dL   Calcium, Total 7.4 (L) 8.5 - 10.5 mg/dL   BUN/CREA Ratio 17.4 10.0 - 20.0   Anion Gap 8 0 - 18 mmol/L   Calculated Osmolal Negative mg/dL   Squamous Epi.  Cells Few /HPF   WBC Urine 64 (H) 0 - 5 /HPF   RBC URINE 4 (H) 0 - 3 /HPF   Bacteria Urine Few (A) None Seen /HPF   -COMP METABOLIC PANEL (14)   Result Value Ref Range   Glucose 115 (H) 70 - 99 mg/dL   Sodium 130 (L) 136 - 14 Negative   Ascorbic Acid Urine Negative Negative mg/dL   Squamous Epi.  Cells Few /HPF   Hyaline Casts 1 0 - 5 /LPF   WBC Urine 251 (H) 0 - 5 /HPF   WBC Clump Few (A) None seen /HPF   RBC URINE 16 (H) 0 - 3 /HPF   Bacteria Urine Many (A) None Seen /HPF   -A g/dL   A/G Ratio 0.8 (L) 1.0 - 2.0   Anion Gap 8 0 - 18 mmol/L   BUN/CREA Ratio 12.8 10.0 - 20.0   Calculated Osmolality 292 275 - 295 mOsm/kg   GFR, Non-African American 26 (L) >=60   GFR, -American 30 (L) >=60   -PROTHROMBIN TIME (PT)   Result Amanda 295 mOsm/kg   GFR, Non-African American 29 (L) >=60   GFR, -American 34 (L) >=60   -URINALYSIS WITH CULTURE REFLEX   Result Value Ref Range   Urine Color Yellow Yellow   Clarity Urine Clear Clear   Spec Gravity 1.011 1.002 - 1.035   pH Urine 7.0 5.0 Rods    -URINE CULTURE, ROUTINE   Result Value Ref Range   Urine Culture >100,000 CFU/ML Escherichia coli (A)    *Culture results found, see result in Chart Review     -BLOOD CULTURE   Result Value Ref Range   Blood Culture Result No Growth 5 Days    -BLOO took extra doses of lucemyra %   Monocyte % 9 %   Eosinophil % 0 %   Basophil % 0 %   Band % 6 0-10 % %   Neutrophil Absolute 19.1 (H) 1.8 - 7.7 K/UL   Lymphocyte Absolute 0.4 (L) 1.0 - 4.0 K/UL   Monocyte Absolute 1.9 (H) 0.0 - 1.0 K/UL   Eosinophil Absolute 0.0 0.0 - 0.7 K/UL   Baso Neutrophil % 78 %   Lymphocyte % 5 %   Monocyte % 10 %   Eosinophil % 1 %   Basophil % 0 %   Band % 6 0-10 % %   Neutrophil Absolute 13.8 (H) 1.8 - 7.7 K/UL   Lymphocyte Absolute 0.8 (L) 1.0 - 4.0 K/UL   Monocyte Absolute 1.6 (H) 0.0 - 1.0 K/UL   Eosinop  (H) 140 - 400 K/UL   MPV 7.5 7.4 - 10.3 fL   Neutrophil % 75 %   Lymphocyte % 12 %   Monocyte % 10 %   Eosinophil % 3 %   Basophil % 1 %   Neutrophil Absolute 8.7 (H) 1.8 - 7.7 K/UL   Lymphocyte Absolute 1.4 1.0 - 4.0 K/UL   Monocyte Absolute 1.2 %    (H) 140 - 400 K/UL   MPV 6.9 (L) 7.4 - 10.3 fL   Neutrophil % 82 %   Lymphocyte % 8 %   Monocyte % 8 %   Eosinophil % 2 %   Basophil % 0 %   Neutrophil Absolute 14.7 (H) 1.8 - 7.7 K/UL   Lymphocyte Absolute 1.5 1.0 - 4.0 K/UL   Monocyte Absolut

## 2019-06-28 NOTE — ED PROVIDER NOTE - CLINICAL SUMMARY MEDICAL DECISION MAKING FREE TEXT BOX
53y male presenting after taking an extra dose of Lofexidine.  Asymptomatic, has not taken over the MDD, no interactions with lamotragine listed on lexicomp, will get EKG to check for possible long QT. Will observe for 3 hours from ingestion and DC. 53y male presenting after taking an extra dose of Lofexidine.  Asymptomatic, has not taken over the MDD, no interactions with lamotragine listed on lexicomp, will get EKG to check for possible long QT. Will observe for 3 hours from ingestion and DC.  Attending Zelda Arriola: 52 y/o male presenting after taking accidental extra dose of lofexidine. upon arirval pt hemodynamically stable. no evidence of hypotension. ekg shows no evidence of prlonged qtc. pt denies any si. pt asymptomatic in the ed. reviewed associated toxidrome and with small dose unlikely to be symptomatic. pt observed in the ed without developing symptoms. pt denies any depressive symptoms, and states was an accident. well apppearing. will d/c

## 2019-06-28 NOTE — ED PROVIDER NOTE - PROGRESS NOTE DETAILS
[place], PGY-1 [place], PGY-1: [place], PGY-1: patient is still asymptomatic.  Will recheck vitals, if ok will DC.

## 2019-06-28 NOTE — ED PROVIDER NOTE - OBJECTIVE STATEMENT
53y male with PMH of seizures, DM, HTN presenting because he took an extra dose of Lofexidine (Lucemyra).  Patient was taking nap woke up and took his afternoon dose, was unsure if he also took his afternoon dose before the nap, counted his pills and thinks he is a dose short.  He is not experiencing any symptoms at this time, he was concerned that he took an extra dose and worried if it would interfere with his seizure medications (Lamotrigine). No dizziness, headache, vision changes, chest pain, palpitations, lethargy. 53y male with PMH of seizures, DM, HTN presenting because he took an extra dose of Lofexidine (Lucemyra).  Patient was taking nap woke up and took his afternoon dose, was unsure if he also took his afternoon dose before the nap, counted his pills and thinks he is a dose short.  He is not experiencing any symptoms at this time, he was concerned that he took an extra dose and worried if it would interfere with his seizure medications (Lamotrigine). No dizziness, headache, vision changes, chest pain, palpitations, lethargy, numbness or weakness. 53y male with PMH of seizures, DM, HTN presenting because he took an extra dose of Lofexidine (Lucemyra).  Patient was taking nap woke up and took his afternoon dose, was unsure if he also took his afternoon dose before the nap, counted his pills and thinks he is a dose short. He takes 0.54mg QID (max dose is 0.72mg/dose or 2.88mg/day).  He is not experiencing any symptoms at this time, he was concerned that he took an extra dose and worried if it would interfere with his seizure medications (Lamotrigine). No dizziness, headache, vision changes, chest pain, palpitations, lethargy, numbness or weakness.

## 2019-06-28 NOTE — ED PROVIDER NOTE - ATTENDING CONTRIBUTION TO CARE
Attending MD Zelda Arriola:  I personally have seen and examined this patient.  Resident note reviewed and agree on plan of care and except where noted.  See HPI, PE, and MDM for details.

## 2019-06-28 NOTE — ED PROVIDER NOTE - PHYSICAL EXAMINATION
Attending Zelda Arriola: Gen: NAD, heent: atrauamtic, eomi, perrla, mmm, op pink, uvula midline, neck; nttp, no nuchal rigidity, chest: nttp, no crepitus, cv: rrr, no murmurs, lungs: ctab, abd: soft, nontender, nondistended, no peritoneal signs, +BS, no guarding, ext: wwp, neg homans, skin: no rash, neuro: awake and alert, following commands, speech clear, sensation and strength intact, no focal deficits psych: no si or hi

## 2019-06-28 NOTE — ED PROVIDER NOTE - NSFOLLOWUPINSTRUCTIONS_ED_ALL_ED_FT
Please follow up with your primary doctor with in the next week.    Do not take your evening dose of Lucemyra, continue to take the rest of your medications as prescribed.    Continue taking Lucemyra as prescribed tomorrow morning.    Return to the emergency department if you have chest pain, palpitations, lightheadedness, dizziness, or loss of consciousness.

## 2019-06-28 NOTE — ED ADULT NURSE NOTE - OBJECTIVE STATEMENT
53y male with hx of seizures presents to the ER c/o taking one extra dose of Lucemyra medication. pt is alert and oriented x 4 and speaking coherently. pt states he takes this medication 3 times per day. he remembers taking his morning and afternoon dose. he thinks he took a dose before his nap, and then woke up and took an extra dose. pt states he counted his pills and is a few pills short, and this is why he believes he took the extra dose. pt has no complaints at this time. vss. md robles completed. will reassess.

## 2019-07-15 ENCOUNTER — MEDICATION RENEWAL (OUTPATIENT)
Age: 54
End: 2019-07-15

## 2019-07-15 NOTE — ED ADULT NURSE NOTE - DISCHARGE DATE/TIME
Lyme titers came positive Western Blot still pending I spoke with the patient about this   With mother, will start pt on amoxicillin 17-Mar-2019 02:38

## 2019-08-27 ENCOUNTER — APPOINTMENT (OUTPATIENT)
Dept: NEUROLOGY | Facility: CLINIC | Age: 54
End: 2019-08-27
Payer: MEDICAID

## 2019-08-27 VITALS
HEIGHT: 74 IN | WEIGHT: 224 LBS | SYSTOLIC BLOOD PRESSURE: 147 MMHG | HEART RATE: 80 BPM | DIASTOLIC BLOOD PRESSURE: 75 MMHG | BODY MASS INDEX: 28.75 KG/M2

## 2019-08-27 PROCEDURE — 99214 OFFICE O/P EST MOD 30 MIN: CPT

## 2019-08-27 NOTE — PHYSICAL EXAM
[General Appearance - Alert] : alert [General Appearance - In No Acute Distress] : in no acute distress [Oriented To Time, Place, And Person] : oriented to person, place, and time [Impaired Insight] : insight and judgment were intact [Affect] : the affect was normal [Person] : oriented to person [Place] : oriented to place [Time] : oriented to time [Visual Intact] : visual attention was ~T not ~L decreased [Concentration Intact] : normal concentrating ability [Naming Objects] : no difficulty naming common objects [Repeating Phrases] : no difficulty repeating a phrase [Writing A Sentence] : no difficulty writing a sentence [Fluency] : fluency intact [Comprehension] : comprehension intact [Reading] : reading intact [Past History] : adequate knowledge of personal past history [Cranial Nerves Oculomotor (III)] : extraocular motion intact [Cranial Nerves Optic (II)] : visual acuity intact bilaterally,  visual fields full to confrontation, pupils equal round and reactive to light [Cranial Nerves Trigeminal (V)] : facial sensation intact symmetrically [Cranial Nerves Facial (VII)] : face symmetrical [Cranial Nerves Glossopharyngeal (IX)] : tongue and palate midline [Cranial Nerves Vestibulocochlear (VIII)] : hearing was intact bilaterally [Cranial Nerves Hypoglossal (XII)] : there was no tongue deviation with protrusion [Cranial Nerves Accessory (XI - Cranial And Spinal)] : head turning and shoulder shrug symmetric [Motor Strength] : muscle strength was normal in all four extremities [Motor Tone] : muscle tone was normal in all four extremities [No Muscle Atrophy] : normal bulk in all four extremities [Motor Handedness Right-Handed] : the patient is right hand dominant [Sensation Tactile Decrease] : light touch was intact [Abnormal Walk] : normal gait [Past-pointing] : there was no past-pointing [Balance] : balance was intact [Tremor] : no tremor present [3+] : Patella left 3+ [2+] : Ankle jerk right 2+ [Plantar Reflex Right Only] : normal on the right [Plantar Reflex Left Only] : normal on the left [FreeTextEntry4] : easily excitable, pacing during interview [Sclera] : the sclera and conjunctiva were normal [Outer Ear] : the ears and nose were normal in appearance [Neck Appearance] : the appearance of the neck was normal [Apical Impulse] : the apical impulse was normal [Heart Rate And Rhythm] : heart rate was normal and rhythm regular [Abdomen Tenderness] : non-tender [Nail Clubbing] : no clubbing  or cyanosis of the fingernails [Skin Lesions] : no skin lesions [] : no rash

## 2019-08-27 NOTE — DATA REVIEWED
[de-identified] : 2007 MRI neg\par 1/2017: MRI neg (OSH)\par 2/2018: MRI C spine: C3-5 C7 canal stenosis, compression [de-identified] : 2007, 2009, 2011 AEEG 48hr neg\par The patient had ambulatory EEG monitoring in April 2016.  This has shown a left temporal seizure on the random one day recording.   [de-identified] : EMG 3/2018: CTS bilat R>L, ulnar neuropathy on the L
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
## 2019-08-27 NOTE — REVIEW OF SYSTEMS
[Feeling Tired] : feeling tired [Anxiety] : anxiety [Depression] : depression [Confused or Disoriented] : confusion [Memory Lapses or Loss] : memory loss [Decr. Concentrating Ability] : decreased concentrating ability [Difficulty with Language] : ~M difficulty with language [Changed Thought Patterns] : changed thought patterns [Numbness] : numbness [Tingling] : tingling [Abnormal Sensation] : an abnormal sensation [Seizures] : convulsions [As Noted in HPI] : as noted in HPI [Palpitations] : palpitations [Abdominal Pain] : abdominal pain [Negative] : Heme/Lymph

## 2019-08-27 NOTE — DISCUSSION/SUMMARY
[Medically Refractory (seizure within the last year)] : Medically Refractory (seizure within the last year) [FreeTextEntry1] : Seizures since 2007 baseline was q1-5months, Likely underreported. 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  History of poor response to OXC, poor tolerability of VPA, PGB.  \par Prior NL MRI.\par Stress, anxiety, mood issues, poor med tolerability, poor insight a factor barriers to his own care.\par Chronic pain issues.  Sleep deprivation, insomnia an issue. Poor appetite.\par Memory complaints.\par h/o TBIs/concussions.\par \par On LTG level 6.2 in 2/2019, s/p inc to 225mg bid.\par Future options of higher dose LTG, vimpat, ZNS, TPM.  \par Pt reluctant to pursue surgical options \par F/u LTG level\par \par -Needs a psychiatrist, psychologist. saw LICSW.  Consider AD/SSRI (pt reluctant).  Stress reduction,  exercise.\par -Referred to sleep specialist in past.  Discussed sleep aide (tried ambien in the past)\par -Physical therapy\par \par -MRI c-spine showing stenosis, f/u with Nsurg Dr Cortes.\par -s/p EMG for sensory disturbance of hands neuropathy: CTS: wear wrist guards at night\par \par voluntarily gave up driving [Safety Recommendations] : The patient was advised in regards to the risk of seizures and general seizure safety recommendations including not to be bathing alone, climbing to high places and operating heavy machinery. [Risks Associated with Driving/[place] Law] : As per my usual protocol, the patient was advised in regards to risks and driving privileges associated with the [place] Guidelines.  [Medication Side Effects] : High frequency and serious potential medication adverse effects were reviewed with the patient, including but not exclusive to psychiatric effects.  Information sheets on medication side effects were made available to the patient in our clinic.  The patient or advocate agrees to notify us for any concerns. [Compliance with Medications] : The importance of compliance with medications was reinforced. [Sleep Hygiene/Sleep Disruption Risks] : Sleep hygiene and the risks of sleep disruption were discussed. [Obtain Copies of Medical Records] : Patient was asked to obtain copies of pertinent prior medical records or studies [Mental Health Evaluation] : Mental health evaluation” was recommended with either our  and psychologist, at [facility] ([facility] ): [phone], or [facility] Intake: [phone]

## 2019-08-27 NOTE — CONSULT LETTER
[Dear  ___] : Dear  [unfilled], [Consult Letter:] : I had the pleasure of evaluating your patient, [unfilled]. [( Thank you for referring [unfilled] for consultation for _____ )] : Thank you for referring [unfilled] for consultation for [unfilled] [Consult Closing:] : Thank you very much for allowing me to participate in the care of this patient.  If you have any questions, please do not hesitate to contact me. [Please see my note below.] : Please see my note below. [FreeTextEntry2] : Dr. Peter Farrell\par [address] [place] [address] [Sincerely,] : Sincerely, [Attending, Dept. of Neurology, Division of Epilepsy] : Attending, Dept. of Neurology, Division of Epilepsy [Santo Boland MD] : Santo Boland MD [ of Neurology] :  of Neurology [[facility]] : [facility]

## 2019-09-03 LAB
ALBUMIN SERPL ELPH-MCNC: 4.9 G/DL
ALP BLD-CCNC: 59 U/L
ALT SERPL-CCNC: 22 U/L
ANION GAP SERPL CALC-SCNC: 13 MMOL/L
AST SERPL-CCNC: 26 U/L
BASOPHILS # BLD AUTO: 0.04 K/UL
BASOPHILS NFR BLD AUTO: 0.5 %
BILIRUB DIRECT SERPL-MCNC: 0.1 MG/DL
BILIRUB INDIRECT SERPL-MCNC: 0.4 MG/DL
BILIRUB SERPL-MCNC: 0.5 MG/DL
BUN SERPL-MCNC: 19 MG/DL
CALCIUM SERPL-MCNC: 10.4 MG/DL
CHLORIDE SERPL-SCNC: 104 MMOL/L
CO2 SERPL-SCNC: 23 MMOL/L
CREAT SERPL-MCNC: 0.96 MG/DL
EOSINOPHIL # BLD AUTO: 0.1 K/UL
EOSINOPHIL NFR BLD AUTO: 1.2 %
GLUCOSE SERPL-MCNC: 170 MG/DL
HCT VFR BLD CALC: 43.8 %
HGB BLD-MCNC: 14.7 G/DL
IMM GRANULOCYTES NFR BLD AUTO: 0.1 %
LYMPHOCYTES # BLD AUTO: 2.74 K/UL
LYMPHOCYTES NFR BLD AUTO: 33.2 %
MAN DIFF?: NORMAL
MCHC RBC-ENTMCNC: 30.9 PG
MCHC RBC-ENTMCNC: 33.6 GM/DL
MCV RBC AUTO: 92.2 FL
MONOCYTES # BLD AUTO: 0.49 K/UL
MONOCYTES NFR BLD AUTO: 5.9 %
NEUTROPHILS # BLD AUTO: 4.88 K/UL
NEUTROPHILS NFR BLD AUTO: 59.1 %
PLATELET # BLD AUTO: 210 K/UL
POTASSIUM SERPL-SCNC: 4.8 MMOL/L
PROT SERPL-MCNC: 7.2 G/DL
RBC # BLD: 4.75 M/UL
RBC # FLD: 12.6 %
SODIUM SERPL-SCNC: 140 MMOL/L
TSH SERPL-ACNC: 0.96 UIU/ML
VIT B12 SERPL-MCNC: 1466 PG/ML
WBC # FLD AUTO: 8.26 K/UL

## 2019-09-26 NOTE — ED ADULT NURSE NOTE - NSHOSCREENINGQ1_ED_ALL_ED
No Banner Transposition Flap Text: The defect edges were debeveled with a #15 scalpel blade.  Given the location of the defect and the proximity to free margins a Banner transposition flap was deemed most appropriate.  Using a sterile surgical marker, an appropriate flap drawn around the defect. The area thus outlined was incised deep to adipose tissue with a #15 scalpel blade.  The skin margins were undermined to an appropriate distance in all directions utilizing iris scissors.

## 2019-10-08 DIAGNOSIS — G31.84 MILD COGNITIVE IMPAIRMENT, SO STATED: ICD-10-CM

## 2019-11-05 ENCOUNTER — RX RENEWAL (OUTPATIENT)
Age: 54
End: 2019-11-05

## 2019-12-01 ENCOUNTER — OUTPATIENT (OUTPATIENT)
Dept: OUTPATIENT SERVICES | Facility: HOSPITAL | Age: 54
LOS: 1 days | End: 2019-12-01
Payer: MEDICAID

## 2019-12-01 DIAGNOSIS — Z98.89 OTHER SPECIFIED POSTPROCEDURAL STATES: Chronic | ICD-10-CM

## 2019-12-01 PROCEDURE — G9001: CPT

## 2019-12-26 ENCOUNTER — EMERGENCY (EMERGENCY)
Facility: HOSPITAL | Age: 54
LOS: 1 days | Discharge: HOME | End: 2019-12-26
Attending: EMERGENCY MEDICINE
Payer: MEDICAID

## 2019-12-26 VITALS
TEMPERATURE: 100 F | WEIGHT: 214.95 LBS | RESPIRATION RATE: 18 BRPM | SYSTOLIC BLOOD PRESSURE: 106 MMHG | HEIGHT: 74 IN | HEART RATE: 74 BPM | OXYGEN SATURATION: 97 % | DIASTOLIC BLOOD PRESSURE: 72 MMHG

## 2019-12-26 DIAGNOSIS — Z98.89 OTHER SPECIFIED POSTPROCEDURAL STATES: Chronic | ICD-10-CM

## 2019-12-26 LAB
ALBUMIN SERPL ELPH-MCNC: 4.6 G/DL — SIGNIFICANT CHANGE UP (ref 3.3–5)
ALP SERPL-CCNC: 52 U/L — SIGNIFICANT CHANGE UP (ref 40–120)
ALT FLD-CCNC: 27 U/L — SIGNIFICANT CHANGE UP (ref 10–45)
ANION GAP SERPL CALC-SCNC: 14 MMOL/L — SIGNIFICANT CHANGE UP (ref 5–17)
AST SERPL-CCNC: 25 U/L — SIGNIFICANT CHANGE UP (ref 10–40)
BASOPHILS # BLD AUTO: 0.03 K/UL — SIGNIFICANT CHANGE UP (ref 0–0.2)
BASOPHILS NFR BLD AUTO: 0.7 % — SIGNIFICANT CHANGE UP (ref 0–2)
BILIRUB SERPL-MCNC: 0.3 MG/DL — SIGNIFICANT CHANGE UP (ref 0.2–1.2)
BUN SERPL-MCNC: 17 MG/DL — SIGNIFICANT CHANGE UP (ref 7–23)
CALCIUM SERPL-MCNC: 9.6 MG/DL — SIGNIFICANT CHANGE UP (ref 8.4–10.5)
CHLORIDE SERPL-SCNC: 99 MMOL/L — SIGNIFICANT CHANGE UP (ref 96–108)
CO2 SERPL-SCNC: 23 MMOL/L — SIGNIFICANT CHANGE UP (ref 22–31)
CREAT SERPL-MCNC: 1.07 MG/DL — SIGNIFICANT CHANGE UP (ref 0.5–1.3)
EOSINOPHIL # BLD AUTO: 0 K/UL — SIGNIFICANT CHANGE UP (ref 0–0.5)
EOSINOPHIL NFR BLD AUTO: 0 % — SIGNIFICANT CHANGE UP (ref 0–6)
GLUCOSE SERPL-MCNC: 150 MG/DL — HIGH (ref 70–99)
HCT VFR BLD CALC: 41.6 % — SIGNIFICANT CHANGE UP (ref 39–50)
HGB BLD-MCNC: 14.3 G/DL — SIGNIFICANT CHANGE UP (ref 13–17)
IMM GRANULOCYTES NFR BLD AUTO: 0.2 % — SIGNIFICANT CHANGE UP (ref 0–1.5)
LYMPHOCYTES # BLD AUTO: 1.8 K/UL — SIGNIFICANT CHANGE UP (ref 1–3.3)
LYMPHOCYTES # BLD AUTO: 39.1 % — SIGNIFICANT CHANGE UP (ref 13–44)
MAGNESIUM SERPL-MCNC: 1.7 MG/DL — SIGNIFICANT CHANGE UP (ref 1.6–2.6)
MCHC RBC-ENTMCNC: 31.4 PG — SIGNIFICANT CHANGE UP (ref 27–34)
MCHC RBC-ENTMCNC: 34.4 GM/DL — SIGNIFICANT CHANGE UP (ref 32–36)
MCV RBC AUTO: 91.2 FL — SIGNIFICANT CHANGE UP (ref 80–100)
MONOCYTES # BLD AUTO: 0.55 K/UL — SIGNIFICANT CHANGE UP (ref 0–0.9)
MONOCYTES NFR BLD AUTO: 12 % — SIGNIFICANT CHANGE UP (ref 2–14)
NEUTROPHILS # BLD AUTO: 2.21 K/UL — SIGNIFICANT CHANGE UP (ref 1.8–7.4)
NEUTROPHILS NFR BLD AUTO: 48 % — SIGNIFICANT CHANGE UP (ref 43–77)
NRBC # BLD: 0 /100 WBCS — SIGNIFICANT CHANGE UP (ref 0–0)
PLATELET # BLD AUTO: 144 K/UL — LOW (ref 150–400)
POTASSIUM SERPL-MCNC: 4 MMOL/L — SIGNIFICANT CHANGE UP (ref 3.5–5.3)
POTASSIUM SERPL-SCNC: 4 MMOL/L — SIGNIFICANT CHANGE UP (ref 3.5–5.3)
PROT SERPL-MCNC: 7.3 G/DL — SIGNIFICANT CHANGE UP (ref 6–8.3)
RBC # BLD: 4.56 M/UL — SIGNIFICANT CHANGE UP (ref 4.2–5.8)
RBC # FLD: 13.2 % — SIGNIFICANT CHANGE UP (ref 10.3–14.5)
SODIUM SERPL-SCNC: 136 MMOL/L — SIGNIFICANT CHANGE UP (ref 135–145)
WBC # BLD: 4.6 K/UL — SIGNIFICANT CHANGE UP (ref 3.8–10.5)
WBC # FLD AUTO: 4.6 K/UL — SIGNIFICANT CHANGE UP (ref 3.8–10.5)

## 2019-12-26 PROCEDURE — 93010 ELECTROCARDIOGRAM REPORT: CPT

## 2019-12-26 PROCEDURE — 99284 EMERGENCY DEPT VISIT MOD MDM: CPT | Mod: 25

## 2019-12-26 PROCEDURE — 83735 ASSAY OF MAGNESIUM: CPT

## 2019-12-26 PROCEDURE — 99284 EMERGENCY DEPT VISIT MOD MDM: CPT

## 2019-12-26 PROCEDURE — 96374 THER/PROPH/DIAG INJ IV PUSH: CPT

## 2019-12-26 PROCEDURE — 70450 CT HEAD/BRAIN W/O DYE: CPT | Mod: 26

## 2019-12-26 PROCEDURE — 80175 DRUG SCREEN QUAN LAMOTRIGINE: CPT

## 2019-12-26 PROCEDURE — 93005 ELECTROCARDIOGRAM TRACING: CPT

## 2019-12-26 PROCEDURE — 85027 COMPLETE CBC AUTOMATED: CPT

## 2019-12-26 PROCEDURE — 70450 CT HEAD/BRAIN W/O DYE: CPT

## 2019-12-26 PROCEDURE — 80053 COMPREHEN METABOLIC PANEL: CPT

## 2019-12-26 RX ORDER — ACETAMINOPHEN 500 MG
975 TABLET ORAL ONCE
Refills: 0 | Status: COMPLETED | OUTPATIENT
Start: 2019-12-26 | End: 2019-12-26

## 2019-12-26 RX ORDER — LIDOCAINE 4 G/100G
1 CREAM TOPICAL ONCE
Refills: 0 | Status: COMPLETED | OUTPATIENT
Start: 2019-12-26 | End: 2019-12-26

## 2019-12-26 RX ORDER — KETOROLAC TROMETHAMINE 30 MG/ML
15 SYRINGE (ML) INJECTION ONCE
Refills: 0 | Status: DISCONTINUED | OUTPATIENT
Start: 2019-12-26 | End: 2019-12-26

## 2019-12-26 RX ORDER — IBUPROFEN 200 MG
600 TABLET ORAL ONCE
Refills: 0 | Status: DISCONTINUED | OUTPATIENT
Start: 2019-12-26 | End: 2019-12-26

## 2019-12-26 RX ADMIN — Medication 40 MILLIGRAM(S): at 20:00

## 2019-12-26 RX ADMIN — Medication 975 MILLIGRAM(S): at 20:00

## 2019-12-26 RX ADMIN — LIDOCAINE 1 PATCH: 4 CREAM TOPICAL at 19:58

## 2019-12-26 RX ADMIN — Medication 15 MILLIGRAM(S): at 23:11

## 2019-12-26 NOTE — ED PROVIDER NOTE - OBJECTIVE STATEMENT
55 y/o M with PMHx of seizure d/o on lamotrigine, HTN, HLD, T2DM, depression, ?anxiety on clonazepam, chronic back pain previously on oxycodone-APAP but stopped taking 1 month ago, presents c/o back pain. As per pt, he had a seizure 2 days ago which he attributes to increased life stress with his family - notes he has been compliant with all of his medications and states he has had seizures in the past during periods of increased life stress. Pt notes he was walking around his house, hands felt tremulous, then he awoke in his bed (pt lives alone). Pt believes he fell on his L side during the seizure because he has been having L hip and L lower back pain since radiating down the posterior thigh, worse with movement. Pt states it feels similar to his prior back pain but more severe, told he has L4-5 disc bulge in the past and very concerned it has worsened after the fall. Pt also notes bitemporal sharp and squeezing HA, moderate severity, occasionally periocular radiation, nausea and several episodes of vomiting over the past 2 days and unable to tolerate PO until this morning when n/v resolved. Pt denies HA, change in vision, nuchal rigidity, numbness, paresthesias, weakness, difficulty speaking/swallowing/walking, f/c, CP, SOB, dysuria, hematuria, change in stools, urinary or fecal retention/incontinence, saddle anesthesia, or rash. No pain meds taken today, tried 1000mg tylenol yesterday with minimal relief.

## 2019-12-26 NOTE — ED PROVIDER NOTE - PHYSICAL EXAMINATION
GEN: Pt in NAD, A&O x3.  PSYCH: Anxious.  EYES: Sclera white w/o injection, PERRLA, EOMI.   ENT: Head NCAT. Nose w/o deformity. No auricular TTP. MMM. Neck supple FROM.   RESP: No chest wall tenderness, CTA b/l, no wheezes, rales, or rhonchi.   CARDIAC: RRR, clear distinct S1, S2, no appreciable murmurs.  ABD: Abdomen soft, non-tender. No CVAT b/l.  VASC: 2+ radial and dorsalis pedis pulses b/l. No edema or calf tenderness.  NEURO: CN2-12 grossly intact. Normal and equal sensation and 5/5 strength UE and LE b/l. Pronator drift negative, Romberg negative. Steady gait, walks with plantar flexion of L foot.   MSK: No joint erythema or obvious deformity. No obvious spinal deformity, no midline spinal ttp, no step-offs. +tenderness of paraspinal musculature in the L lumbar legion. No bony tenderness or palpable deformity. FROM b/l UE and LE w/o pain.   SKIN: No rashes on the trunk or L hip.

## 2019-12-26 NOTE — ED PROVIDER NOTE - CHIEF COMPLAINT
The patient is a 54y Male complaining of seizures. The patient is a 54y Male complaining of back pain.

## 2019-12-26 NOTE — ED ADULT NURSE NOTE - NSIMPLEMENTINTERV_GEN_ALL_ED
Implemented All Fall with Harm Risk Interventions:  [place] to call system. Call bell, personal items and telephone within reach. Instruct patient to call for assistance. Room bathroom lighting operational. Non-slip footwear when patient is off stretcher. Physically safe environment: no spills, clutter or unnecessary equipment. Stretcher in lowest position, wheels locked, appropriate side rails in place. Provide visual cue, wrist band, yellow gown, etc. Monitor gait and stability. Monitor for mental status changes and reorient to person, place, and time. Review medications for side effects contributing to fall risk. Reinforce activity limits and safety measures with patient and family. Provide visual clues: red socks.
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
## 2019-12-26 NOTE — ED PROVIDER NOTE - NSFOLLOWUPINSTRUCTIONS_ED_ALL_ED_FT
1) Follow-up with your PCP in 2-3 days.      Follow-up with orthopedics or a spine specialist within 1 week      Bring all printed results to discuss with your provider.    2) Avoid strenuous activity but do not strictly rest in bed. Move around throughout the day to prevent the back from becoming more stiff. Use warm compresses.    3) Pain can be managed with Tylenol 1000mg (2 extra strength tablets) every 8 hours and/or ibuprofen  600mg (3 regular strength tablets) every 8 hours. Apply an over the counter lidocaine patch to the area, use as directed.    4) Continue to take all other medications as directed.    5) Return to the emergency room immediately if your symptoms worsen or persist, fever, new or worsening pain in the affected area, numbness, tingling, weakness, difficulty walking, weight loss, redness of the back, abdominal pain, vomiting, incontinence (pooping or peeing yourself), or if any other concerning or questionable symptoms.

## 2019-12-26 NOTE — ED PROVIDER NOTE - PROGRESS NOTE DETAILS
pt reports improvement of pain, would like to go home. labs, images, and plan d/w pt. all questions answered. pt ready and stable for DC. -Gregg Lopez PA-C

## 2019-12-26 NOTE — ED PROVIDER NOTE - PATIENT PORTAL LINK FT
You can access the FollowMyHealth Patient Portal offered by [facility] by registering at the following website: [email or web address]. By joining [facility]’s FollowMyHealth portal, you will also be able to view your health information using other applications (apps) compatible with our system.

## 2019-12-26 NOTE — CONSULT NOTE ADULT - ASSESSMENT
Assessment:  Breakthrough seizure  LBP exacerbation possible 2/2 unwitnessed fall.   Insomnia    Plan:  #Seizures   - Continue home AEDs   - Lamictal level   - Seizure Precautions reviewed with patient:      - Avoid activities that may cause harm if there is sudden loss of consciousness until followup, or for at least 6 months, including: driving, operating heavy machinery, standing at heights or near open flames, bathing or swimming alone.      - If diagnosed with Epilepsy, per [facility] law: no driving for 1 year, or for 6 months with physician approval.    #LBP   - Skelaxin 800mg TID x7d   - Naprosyn 500mg BID x7d   - MRI Lumbar Spine (outpatient, h/o disc herniation, evaluation)    #Insomnia   - Benadryl 50mg qHS PRN insomnia. Assessment:  Breakthrough seizure  LBP exacerbation possible 2/2 unwitnessed fall.   Insomnia    Plan:  #Seizures   - Continue home AEDs   - Lamictal level   - Seizure Precautions reviewed with patient:      - Avoid activities that may cause harm if there is sudden loss of consciousness including: driving, operating heavy machinery, standing at heights or near open flames, bathing or swimming alone.      - If diagnosed with Epilepsy, per [facility] law: no driving for at least 1 year. Patient reports that he does not drive.     #LBP   - Skelaxin 800mg TID x7d   - Naprosyn 500mg BID x7d   - MRI Lumbar Spine (outpatient, h/o disc herniation, evaluation)    #Insomnia   - Benadryl 50mg qHS PRN insomnia.    - PCP evaluation     Avoid alcohol and other sedatives while using medications. If dizziness/confusion occurs, please discontinue Benadryl and Skelaxin and call your PCP.

## 2019-12-26 NOTE — ED PROVIDER NOTE - ATTENDING CONTRIBUTION TO CARE
54M, pmh seizure disorder on lamotrigine, htn, hld, DM II, prsents with complaint of worsening L lower back pain, along with seizure two days ago. Last week pt felt aura but did not seize. Prior to that last seizure was several months prior. pt states two days ago was walking, hands felt tremulous, he then awoke in his bed. states sx prior to losiong consciousness c/w previous auras prior to seizure. no incontinence, tongue biting. pt presented today not due to seizure but instead due to worsening L low back pain. no numbness, weakness, incontinence, f/c. no hx ivdu. no recent procedures.    PE: NAD, NCAT, MMM, Trachea midline, Normal conjunctiva, lungs CTAB, S1/S2 RRR, Normal perfusion, 2+ radial pulses bilat, Abdomen Soft, NTND, No rebound/guarding, No LE edema, No deformity of extremities, No rashes,  No focal motor or sensory deficits. No midline C, T, L ttp. CN II-XII intact. Visual fields intact. EOMI, PERRLA. Facial sensation equal bilat. Smile and eye closure equal bilat. Hearing intact bilat. Palate elevation equal, tongue protrusion midline. Lateral head rotation equal bilat. 5/5 strength UE and LE bilat. Sensation grossly intact. No pronator drift. Gait - walks with plantar flexion L foot but per pt this is baseline. +L paraspinal lumbar ttp. FROM bilat UE and LE no bony or joint ttp.    EKG unremarkable. Episode of LOC most c/w seizure, will check ct head. Check lamictal level. Check CBC eval for anemia, cmp eval for metabolic derangement. Given breakthrough seizure, fact pt seen by [facility] neuro, consult neuro for any recs re seizure eds. Re-eval. - Andre Sun MD

## 2019-12-26 NOTE — ED ADULT NURSE NOTE - OBJECTIVE STATEMENT
Pt presents for eval of low back pain radiating down left leg after sz 2 days ago.  Pt has known hx of sz, reports missing meds until today because he was also having n/v.  Oral mucosa moist, no evidence of tongue injury.

## 2019-12-27 DIAGNOSIS — Z71.89 OTHER SPECIFIED COUNSELING: ICD-10-CM

## 2019-12-28 LAB — LAMOTRIGINE SERPL-MCNC: 5.1 MCG/ML — SIGNIFICANT CHANGE UP (ref 2.5–15)

## 2020-02-03 ENCOUNTER — APPOINTMENT (OUTPATIENT)
Dept: SPINE | Facility: CLINIC | Age: 55
End: 2020-02-03
Payer: MEDICAID

## 2020-02-03 VITALS
DIASTOLIC BLOOD PRESSURE: 67 MMHG | TEMPERATURE: 98.4 F | HEART RATE: 77 BPM | SYSTOLIC BLOOD PRESSURE: 102 MMHG | BODY MASS INDEX: 27.59 KG/M2 | WEIGHT: 215 LBS | HEIGHT: 74 IN | OXYGEN SATURATION: 97 %

## 2020-02-03 PROCEDURE — 99213 OFFICE O/P EST LOW 20 MIN: CPT

## 2020-02-03 RX ORDER — TAMSULOSIN HYDROCHLORIDE 0.4 MG/1
0.4 CAPSULE ORAL
Refills: 0 | Status: ACTIVE | COMMUNITY

## 2020-02-03 NOTE — REVIEW OF SYSTEMS
[Numbness] : numbness [Tingling] : tingling [Difficulty Walking] : difficulty walking [Negative] : Heme/Lymph [de-identified] : lower back pain and left leg pain
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
## 2020-02-03 NOTE — PHYSICAL EXAM
[Motor Strength] : muscle strength was normal in all four extremities [0] : Patella left 0 [2+] : Ankle jerk left 2+

## 2020-02-03 NOTE — ASSESSMENT
[FreeTextEntry1] : Mr Ramos  suffers from lumbar  radiculopathy. and herniated disc with foraminal stenosis on the left at L4-5.   A surgical intervention was discussed.  Options for both surgery and non surgical treatments were reviewed.  He will trial PT and then return to the office in four weeks.

## 2020-02-28 ENCOUNTER — APPOINTMENT (OUTPATIENT)
Dept: NEUROLOGY | Facility: CLINIC | Age: 55
End: 2020-02-28
Payer: MEDICAID

## 2020-02-28 VITALS
WEIGHT: 220 LBS | SYSTOLIC BLOOD PRESSURE: 135 MMHG | HEART RATE: 78 BPM | HEIGHT: 74 IN | BODY MASS INDEX: 28.23 KG/M2 | DIASTOLIC BLOOD PRESSURE: 76 MMHG

## 2020-02-28 PROCEDURE — 99214 OFFICE O/P EST MOD 30 MIN: CPT

## 2020-02-28 NOTE — REVIEW OF SYSTEMS
[Feeling Tired] : feeling tired [Anxiety] : anxiety [Depression] : depression [Confused or Disoriented] : confusion [Memory Lapses or Loss] : memory loss [Difficulty with Language] : ~M difficulty with language [Decr. Concentrating Ability] : decreased concentrating ability [Changed Thought Patterns] : changed thought patterns [Numbness] : numbness [Abnormal Sensation] : an abnormal sensation [Tingling] : tingling [Seizures] : convulsions [Palpitations] : palpitations [As Noted in HPI] : as noted in HPI [Abdominal Pain] : abdominal pain [Negative] : Endocrine

## 2020-03-02 ENCOUNTER — APPOINTMENT (OUTPATIENT)
Dept: SPINE | Facility: CLINIC | Age: 55
End: 2020-03-02
Payer: MEDICAID

## 2020-03-02 VITALS
WEIGHT: 220 LBS | DIASTOLIC BLOOD PRESSURE: 77 MMHG | SYSTOLIC BLOOD PRESSURE: 127 MMHG | HEART RATE: 74 BPM | HEIGHT: 74 IN | BODY MASS INDEX: 28.23 KG/M2 | TEMPERATURE: 98.1 F | OXYGEN SATURATION: 96 %

## 2020-03-02 PROCEDURE — 99213 OFFICE O/P EST LOW 20 MIN: CPT

## 2020-03-03 NOTE — REASON FOR VISIT
[Follow-Up: _____] : a [unfilled] follow-up visit [Other: _____] : [unfilled] [FreeTextEntry1] : 54 year old male with  lower back and leg pain and a  L 4  L5 herniated disc.  He was instructed to undergo alternative treatment options and he has failed PT and is having pain that impacts his lifestyle.  A lumbar lateral fusion of L4 L5 was discussed.

## 2020-03-03 NOTE — ASSESSMENT
[FreeTextEntry1] : L4 L5 herniated  disc and now for L4 L5 lateral discectomy possible fusion.     T be scheduled n the upcoming weeks and all alternatives, benefits, and risks were reviewed and understood.  CT of the lower spine will be ordered.

## 2020-03-07 ENCOUNTER — APPOINTMENT (OUTPATIENT)
Dept: RADIOLOGY | Facility: IMAGING CENTER | Age: 55
End: 2020-03-07
Payer: MEDICAID

## 2020-03-07 ENCOUNTER — OUTPATIENT (OUTPATIENT)
Dept: OUTPATIENT SERVICES | Facility: HOSPITAL | Age: 55
LOS: 1 days | End: 2020-03-07
Payer: MEDICAID

## 2020-03-07 ENCOUNTER — APPOINTMENT (OUTPATIENT)
Dept: CT IMAGING | Facility: IMAGING CENTER | Age: 55
End: 2020-03-07
Payer: MEDICAID

## 2020-03-07 DIAGNOSIS — Z98.89 OTHER SPECIFIED POSTPROCEDURAL STATES: Chronic | ICD-10-CM

## 2020-03-07 DIAGNOSIS — M54.16 RADICULOPATHY, LUMBAR REGION: ICD-10-CM

## 2020-03-07 PROCEDURE — 72131 CT LUMBAR SPINE W/O DYE: CPT | Mod: 26

## 2020-03-07 PROCEDURE — 72082 X-RAY EXAM ENTIRE SPI 2/3 VW: CPT

## 2020-03-07 PROCEDURE — 72131 CT LUMBAR SPINE W/O DYE: CPT

## 2020-03-07 PROCEDURE — 72082 X-RAY EXAM ENTIRE SPI 2/3 VW: CPT | Mod: 26

## 2020-03-10 ENCOUNTER — OUTPATIENT (OUTPATIENT)
Dept: OUTPATIENT SERVICES | Facility: HOSPITAL | Age: 55
LOS: 1 days | End: 2020-03-10
Payer: MEDICAID

## 2020-03-10 VITALS
SYSTOLIC BLOOD PRESSURE: 123 MMHG | WEIGHT: 214.95 LBS | TEMPERATURE: 97 F | HEIGHT: 74 IN | OXYGEN SATURATION: 99 % | DIASTOLIC BLOOD PRESSURE: 78 MMHG | HEART RATE: 74 BPM | RESPIRATION RATE: 16 BRPM

## 2020-03-10 DIAGNOSIS — Z98.89 OTHER SPECIFIED POSTPROCEDURAL STATES: Chronic | ICD-10-CM

## 2020-03-10 DIAGNOSIS — M54.16 RADICULOPATHY, LUMBAR REGION: ICD-10-CM

## 2020-03-10 DIAGNOSIS — Z29.9 ENCOUNTER FOR PROPHYLACTIC MEASURES, UNSPECIFIED: ICD-10-CM

## 2020-03-10 DIAGNOSIS — I10 ESSENTIAL (PRIMARY) HYPERTENSION: ICD-10-CM

## 2020-03-10 DIAGNOSIS — Z01.818 ENCOUNTER FOR OTHER PREPROCEDURAL EXAMINATION: ICD-10-CM

## 2020-03-10 LAB
ANION GAP SERPL CALC-SCNC: 14 MMOL/L — SIGNIFICANT CHANGE UP (ref 5–17)
BLD GP AB SCN SERPL QL: NEGATIVE — SIGNIFICANT CHANGE UP
BUN SERPL-MCNC: 24 MG/DL — HIGH (ref 7–23)
CALCIUM SERPL-MCNC: 10.7 MG/DL — HIGH (ref 8.4–10.5)
CHLORIDE SERPL-SCNC: 100 MMOL/L — SIGNIFICANT CHANGE UP (ref 96–108)
CO2 SERPL-SCNC: 25 MMOL/L — SIGNIFICANT CHANGE UP (ref 22–31)
CREAT SERPL-MCNC: 0.97 MG/DL — SIGNIFICANT CHANGE UP (ref 0.5–1.3)
GLUCOSE SERPL-MCNC: 146 MG/DL — HIGH (ref 70–99)
HCT VFR BLD CALC: 43.5 % — SIGNIFICANT CHANGE UP (ref 39–50)
HGB BLD-MCNC: 14.3 G/DL — SIGNIFICANT CHANGE UP (ref 13–17)
MCHC RBC-ENTMCNC: 30.6 PG — SIGNIFICANT CHANGE UP (ref 27–34)
MCHC RBC-ENTMCNC: 32.9 GM/DL — SIGNIFICANT CHANGE UP (ref 32–36)
MCV RBC AUTO: 92.9 FL — SIGNIFICANT CHANGE UP (ref 80–100)
NRBC # BLD: 0 /100 WBCS — SIGNIFICANT CHANGE UP (ref 0–0)
PLATELET # BLD AUTO: 200 K/UL — SIGNIFICANT CHANGE UP (ref 150–400)
POTASSIUM SERPL-MCNC: 4.9 MMOL/L — SIGNIFICANT CHANGE UP (ref 3.5–5.3)
POTASSIUM SERPL-SCNC: 4.9 MMOL/L — SIGNIFICANT CHANGE UP (ref 3.5–5.3)
RBC # BLD: 4.68 M/UL — SIGNIFICANT CHANGE UP (ref 4.2–5.8)
RBC # FLD: 13.4 % — SIGNIFICANT CHANGE UP (ref 10.3–14.5)
RH IG SCN BLD-IMP: POSITIVE — SIGNIFICANT CHANGE UP
SODIUM SERPL-SCNC: 139 MMOL/L — SIGNIFICANT CHANGE UP (ref 135–145)
WBC # BLD: 7.53 K/UL — SIGNIFICANT CHANGE UP (ref 3.8–10.5)
WBC # FLD AUTO: 7.53 K/UL — SIGNIFICANT CHANGE UP (ref 3.8–10.5)

## 2020-03-10 PROCEDURE — 85027 COMPLETE CBC AUTOMATED: CPT

## 2020-03-10 PROCEDURE — 86901 BLOOD TYPING SEROLOGIC RH(D): CPT

## 2020-03-10 PROCEDURE — 87641 MR-STAPH DNA AMP PROBE: CPT

## 2020-03-10 PROCEDURE — 80048 BASIC METABOLIC PNL TOTAL CA: CPT

## 2020-03-10 PROCEDURE — 86900 BLOOD TYPING SEROLOGIC ABO: CPT

## 2020-03-10 PROCEDURE — 87640 STAPH A DNA AMP PROBE: CPT

## 2020-03-10 PROCEDURE — G0463: CPT

## 2020-03-10 PROCEDURE — 86850 RBC ANTIBODY SCREEN: CPT

## 2020-03-10 PROCEDURE — 83036 HEMOGLOBIN GLYCOSYLATED A1C: CPT

## 2020-03-10 RX ORDER — LAMOTRIGINE 25 MG/1
0 TABLET, ORALLY DISINTEGRATING ORAL
Qty: 0 | Refills: 0 | DISCHARGE

## 2020-03-10 RX ORDER — CLONAZEPAM 1 MG
0 TABLET ORAL
Qty: 0 | Refills: 0 | DISCHARGE

## 2020-03-10 NOTE — H&P PST ADULT - NSICDXPROBLEM_GEN_ALL_CORE_FT
PROBLEM DIAGNOSES  Problem: Lumbar radiculopathy  Assessment and Plan: L4-5 Far lateral anterior lumbar discectomy  possible open  nasal swab for MRSA sent    Problem: HTN (hypertension)  Assessment and Plan: Instructed to continue meds &  take with sips of water in AM the day of surgery     Problem: Prophylactic measure  Assessment and Plan: The Caprini score indicates this patient is at risk for a VTE event (score 3-5).  Most surgical patients in this group would benefit from pharmacologic prophylaxis.  The surgical team will determine the balance between VTE risk and bleeding risk

## 2020-03-10 NOTE — H&P PST ADULT - NEUROLOGICAL DETAILS
normal strength/responds to verbal commands/alert and oriented x 3/responds to pain/cranial nerves intact

## 2020-03-10 NOTE — H&P PST ADULT - NEUROLOGICAL COMMENTS
h/o seizure 18 months ago left lumbar radiculopathy with lower back pain, numbness tingling in left lower extremity

## 2020-03-10 NOTE — H&P PST ADULT - MUSCULOSKELETAL
details… detailed exam no joint erythema/no joint swelling/no joint warmth/no calf tenderness/ROM intact

## 2020-03-11 LAB
HBA1C BLD-MCNC: 8 % — HIGH (ref 4–5.6)
MRSA PCR RESULT.: SIGNIFICANT CHANGE UP
S AUREUS DNA NOSE QL NAA+PROBE: SIGNIFICANT CHANGE UP

## 2020-03-16 ENCOUNTER — RX RENEWAL (OUTPATIENT)
Age: 55
End: 2020-03-16

## 2020-03-16 RX ORDER — CLONAZEPAM 1 MG/1
1 TABLET ORAL ONCE
Qty: 10 | Refills: 0 | Status: DISCONTINUED | COMMUNITY
Start: 2018-01-19 | End: 2020-03-16

## 2020-03-16 NOTE — ED ADULT TRIAGE NOTE - AS O2 DELIVERY
room air Ear Star Wedge Flap Text: The defect edges were debeveled with a #15 blade scalpel.  Given the location of the defect and the proximity to free margins (helical rim) an ear star wedge flap was deemed most appropriate.  Using a sterile surgical marker, the appropriate flap was drawn incorporating the defect and placing the expected incisions between the helical rim and antihelix where possible.  The area thus outlined was incised through and through with a #15 scalpel blade.

## 2020-04-10 PROBLEM — M54.16 RADICULOPATHY, LUMBAR REGION: Chronic | Status: ACTIVE | Noted: 2020-03-10

## 2020-06-18 ENCOUNTER — OUTPATIENT (OUTPATIENT)
Dept: OUTPATIENT SERVICES | Facility: HOSPITAL | Age: 55
LOS: 1 days | End: 2020-06-18
Payer: MEDICAID

## 2020-06-18 VITALS
SYSTOLIC BLOOD PRESSURE: 129 MMHG | TEMPERATURE: 97 F | OXYGEN SATURATION: 97 % | WEIGHT: 223.11 LBS | DIASTOLIC BLOOD PRESSURE: 79 MMHG | HEART RATE: 82 BPM | RESPIRATION RATE: 18 BRPM | HEIGHT: 74 IN

## 2020-06-18 DIAGNOSIS — G40.909 EPILEPSY, UNSPECIFIED, NOT INTRACTABLE, WITHOUT STATUS EPILEPTICUS: ICD-10-CM

## 2020-06-18 DIAGNOSIS — Z01.818 ENCOUNTER FOR OTHER PREPROCEDURAL EXAMINATION: ICD-10-CM

## 2020-06-18 DIAGNOSIS — Z98.89 OTHER SPECIFIED POSTPROCEDURAL STATES: Chronic | ICD-10-CM

## 2020-06-18 DIAGNOSIS — I10 ESSENTIAL (PRIMARY) HYPERTENSION: ICD-10-CM

## 2020-06-18 DIAGNOSIS — Z29.9 ENCOUNTER FOR PROPHYLACTIC MEASURES, UNSPECIFIED: ICD-10-CM

## 2020-06-18 DIAGNOSIS — E11.9 TYPE 2 DIABETES MELLITUS WITHOUT COMPLICATIONS: ICD-10-CM

## 2020-06-18 DIAGNOSIS — M54.16 RADICULOPATHY, LUMBAR REGION: ICD-10-CM

## 2020-06-18 DIAGNOSIS — G47.33 OBSTRUCTIVE SLEEP APNEA (ADULT) (PEDIATRIC): ICD-10-CM

## 2020-06-18 LAB
ANION GAP SERPL CALC-SCNC: 14 MMOL/L — SIGNIFICANT CHANGE UP (ref 5–17)
BLD GP AB SCN SERPL QL: NEGATIVE — SIGNIFICANT CHANGE UP
BUN SERPL-MCNC: 21 MG/DL — SIGNIFICANT CHANGE UP (ref 7–23)
CALCIUM SERPL-MCNC: 10.6 MG/DL — HIGH (ref 8.4–10.5)
CHLORIDE SERPL-SCNC: 104 MMOL/L — SIGNIFICANT CHANGE UP (ref 96–108)
CO2 SERPL-SCNC: 21 MMOL/L — LOW (ref 22–31)
CREAT SERPL-MCNC: 0.8 MG/DL — SIGNIFICANT CHANGE UP (ref 0.5–1.3)
GLUCOSE SERPL-MCNC: 147 MG/DL — HIGH (ref 70–99)
HCT VFR BLD CALC: 41 % — SIGNIFICANT CHANGE UP (ref 39–50)
HGB BLD-MCNC: 13.7 G/DL — SIGNIFICANT CHANGE UP (ref 13–17)
MCHC RBC-ENTMCNC: 30.8 PG — SIGNIFICANT CHANGE UP (ref 27–34)
MCHC RBC-ENTMCNC: 33.4 GM/DL — SIGNIFICANT CHANGE UP (ref 32–36)
MCV RBC AUTO: 92.1 FL — SIGNIFICANT CHANGE UP (ref 80–100)
NRBC # BLD: 0 /100 WBCS — SIGNIFICANT CHANGE UP (ref 0–0)
PLATELET # BLD AUTO: 195 K/UL — SIGNIFICANT CHANGE UP (ref 150–400)
POTASSIUM SERPL-MCNC: 4.6 MMOL/L — SIGNIFICANT CHANGE UP (ref 3.5–5.3)
POTASSIUM SERPL-SCNC: 4.6 MMOL/L — SIGNIFICANT CHANGE UP (ref 3.5–5.3)
RBC # BLD: 4.45 M/UL — SIGNIFICANT CHANGE UP (ref 4.2–5.8)
RBC # FLD: 12.9 % — SIGNIFICANT CHANGE UP (ref 10.3–14.5)
RH IG SCN BLD-IMP: POSITIVE — SIGNIFICANT CHANGE UP
SODIUM SERPL-SCNC: 139 MMOL/L — SIGNIFICANT CHANGE UP (ref 135–145)
WBC # BLD: 9.59 K/UL — SIGNIFICANT CHANGE UP (ref 3.8–10.5)
WBC # FLD AUTO: 9.59 K/UL — SIGNIFICANT CHANGE UP (ref 3.8–10.5)

## 2020-06-18 PROCEDURE — 86901 BLOOD TYPING SEROLOGIC RH(D): CPT

## 2020-06-18 PROCEDURE — G0463: CPT

## 2020-06-18 PROCEDURE — 87641 MR-STAPH DNA AMP PROBE: CPT

## 2020-06-18 PROCEDURE — 86850 RBC ANTIBODY SCREEN: CPT

## 2020-06-18 PROCEDURE — 85027 COMPLETE CBC AUTOMATED: CPT

## 2020-06-18 PROCEDURE — 83036 HEMOGLOBIN GLYCOSYLATED A1C: CPT

## 2020-06-18 PROCEDURE — 87640 STAPH A DNA AMP PROBE: CPT

## 2020-06-18 PROCEDURE — 80048 BASIC METABOLIC PNL TOTAL CA: CPT

## 2020-06-18 PROCEDURE — 86900 BLOOD TYPING SEROLOGIC ABO: CPT

## 2020-06-18 RX ORDER — METFORMIN HYDROCHLORIDE 850 MG/1
0 TABLET ORAL
Qty: 0 | Refills: 0 | DISCHARGE

## 2020-06-18 NOTE — H&P PST ADULT - NSICDXPASTSURGICALHX_GEN_ALL_CORE_FT
PAST SURGICAL HISTORY:  H/O cervical spine surgery 2010    Status Post Carpal Tunnel Release bilateral

## 2020-06-18 NOTE — H&P PST ADULT - NSICDXPASTMEDICALHX_GEN_ALL_CORE_FT
PAST MEDICAL HISTORY:  Benign Hypertension     Cervical disc disease with myelopathy     Cervical disc disorder with radiculopathy     Cervical disc displacement     Depressive disorder, not elsewhere classified     Diabetes     Hypercholesteremia     Lumbar radiculopathy     Seizure Since 2007, last seizure Fall 2018    Seizure disorder generalized ,   Since 2007, last seizure Fall 2018    Type II or unspecified type diabetes mellitus without mention of complication, not stated as uncontr PAST MEDICAL HISTORY:  Benign Hypertension     Cervical disc disease with myelopathy     Cervical disc disorder with radiculopathy     Cervical disc displacement     Depressive disorder, not elsewhere classified     Diabetes     Enlargement (benign) of prostate     Hypercholesteremia     Lumbar radiculopathy     Seizure Since 2007, last seizure Fall 2018    Seizure disorder generalized ,   Since 2007, last seizure Fall 2018    Type II or unspecified type diabetes mellitus without mention of complication, not stated as uncontr

## 2020-06-18 NOTE — H&P PST ADULT - HISTORY OF PRESENT ILLNESS
54 year old male PMH seizure disorder, h/o cervical disease and s/p C5-C7 ACDF on 10/03/12 .He has mild neck pain which is tolerable. Reports he has left leg pain and back pain radiating  into his left foot with numbness & tingling in left anterior/medial calf region with walking difficulty. MRI shows foraminal stenosis due to a disc herniation at L4-5 on the left side. S/p surgical consult, scheduled for L4-5 discectomy & fusion on 03/25/20. Denies any palpitations, SOB, N/V, fever or chills.     ***Patient will schedule COVID swab 24-72 hours prior to surgery 54 year old male PMH seizure disorder, HTN, DM, cervical disease and s/p C5-C7 ACDF on 10/03/12. Pt c/o mild neck pain which is tolerable. Also reports left leg and back pain, radiating into his left foot, with numbness & tingling in left anterior/medial calf region with walking difficulty. MRI shows foraminal stenosis due to a disc herniation at L4-5 on the left side. Originally scheduled for surgery 3/25/2020 but cancelled d/t COVID pandemic, now scheduled for L4-5 ANTERIOR FAR LATERAL LUMBAR DISCECTOMY POSSIBLE FUSION on 7/1/2020. Denies any palpitations, SOB, N/V, fever or chills.     ***Patient will schedule COVID swab 24-72 hours prior to surgery

## 2020-06-18 NOTE — H&P PST ADULT - MUSCULOSKELETAL
negative detailed exam decreased ROM due to pain/no joint swelling/no joint erythema/no calf tenderness

## 2020-06-18 NOTE — H&P PST ADULT - ASSESSMENT
1)left lumbar radiculopathy  2)AGATHAI VTE 2.0 SCORE [CLOT updated 2019]    AGE RELATED RISK FACTORS                                                       MOBILITY RELATED FACTORS  [x ] Age 41-60 years                                            (1 Point)                    [ ] Bed rest                                                        (1 Point)  [ ] Age: 61-74 years                                           (2 Points)                  [ ] Plaster cast                                                   (2 Points)  [ ] Age= 75 years                                              (3 Points)                    [ ] Bed bound for more than 72 hours                 (2 Points)    DISEASE RELATED RISK FACTORS                                               GENDER SPECIFIC FACTORS  [ ] Edema in the lower extremities                       (1 Point)              [ ] Pregnancy                                                     (1 Point)  [ ] Varicose veins                                               (1 Point)                     [ ] Post-partum < 6 weeks                                   (1 Point)             [x ] BMI > 25 Kg/m2                                            (1 Point)                     [ ] Hormonal therapy  or oral contraception          (1 Point)                 [ ] Sepsis (in the previous month)                        (1 Point)               [ ] History of pregnancy complications                 (1 point)  [ ] Pneumonia or serious lung disease                                               [ ] Unexplained or recurrent                     (1 Point)           (in the previous month)                               (1 Point)  [ ] Abnormal pulmonary function test                     (1 Point)                 SURGERY RELATED RISK FACTORS  [ ] Acute myocardial infarction                              (1 Point)               [ ]  Section                                             (1 Point)  [ ] Congestive heart failure (in the previous month)  (1 Point)      [ ] Minor surgery                                                  (1 Point)   [ ] Inflammatory bowel disease                             (1 Point)               [ ] Arthroscopic surgery                                        (2 Points)  [ ] Central venous access                                      (2 Points)                [x ] General surgery lasting more than 45 minutes (2 points)  [ ] Malignancy- Present or previous                   (2 Points)                [ ] Elective arthroplasty                                         (5 points)    [ ] Stroke (in the previous month)                          (5 Points)                                                                                                                                                           HEMATOLOGY RELATED FACTORS                                                 TRAUMA RELATED RISK FACTORS  [ ] Prior episodes of VTE                                     (3 Points)                [ ] Fracture of the hip, pelvis, or leg                       (5 Points)  [ ] Positive family history for VTE                         (3 Points)             [ ] Acute spinal cord injury (in the previous month)  (5 Points)  [ ] Prothrombin 63868 A                                     (3 Points)               [ ] Paralysis  (less than 1 month)                             (5 Points)  [ ] Factor V Leiden                                             (3 Points)                  [ ] Multiple Trauma within 1 month                        (5 Points)  [ ] Lupus anticoagulants                                     (3 Points)                                                           [ ] Anticardiolipin antibodies                               (3 Points)                                                       [ ] High homocysteine in the blood                      (3 Points)                                             [ ] Other congenital or acquired thrombophilia      (3 Points)                                                [ ] Heparin induced thrombocytopenia                  (3 Points)                                     Total Score [      4    ]

## 2020-06-18 NOTE — H&P PST ADULT - NSICDXPROBLEM_GEN_ALL_CORE_FT
PROBLEM DIAGNOSES  Problem: Lumbar radiculopathy  Assessment and Plan: L4-5 Far lateral anterior lumbar discectomy  possible open  nasal swab for MRSA sent    Problem: HTN (hypertension)  Assessment and Plan: Instructed to continue meds &  take with sips of water in AM the day of surgery     Problem: Prophylactic measure  Assessment and Plan: The Caprini score indicates this patient is at risk for a VTE event (score 3-5).  Most surgical patients in this group would benefit from pharmacologic prophylaxis.  The surgical team will determine the balance between VTE risk and bleeding risk PROBLEM DIAGNOSES  Problem: Lumbar radiculopathy  Assessment and Plan: L4-L5 ANTERIOR FAR LATERAL LUMBAR DISCECTOMY 7/1/2020  POSSIBLE FUSION 7/1/2020  Pre-op education provided - all questions answered   Chlorhexadine soap & instructions provided  CBC, BMP, HA1C, T&S, MRSA/MSSA swab sent in PST  Pt to schedule COVID swab 24-72 hrs prior to surgery  Pt to schedule appt with PMD to obtain medical clearance    Problem: HTN (hypertension)  Assessment and Plan: Pt instructed to continue BP meds & take with sips of water in AM DOS    Problem: Diabetes mellitus  Assessment and Plan: Pt instructed to stop metformin 24hrs prior to surgery, 6/30/2020 AM dose    Problem: Seizure disorder  Assessment and Plan: Pt instructed to continue seizure medications & take with sips of water in AM DOS    Problem: AYALA (obstructive sleep apnea)  Assessment and Plan: By criteria, STOP BANG 5, OR booking notified    Problem: Need for prophylactic measure  Assessment and Plan: The Caprini score indicates this patient is at risk for a VTE event (score 3-5).  Most surgical patients in this group would benefit from pharmacologic prophylaxis.  The surgical team will determine the balance between VTE risk and bleeding risk PROBLEM DIAGNOSES  Problem: Lumbar radiculopathy  Assessment and Plan: L4-L5 ANTERIOR FAR LATERAL LUMBAR DISCECTOMY 7/1/2020  POSSIBLE FUSION 7/1/2020  Pre-op education provided - all questions answered   Chlorhexadine soap & instructions provided  CBC, BMP, HA1C, T&S, MRSA/MSSA swab sent in PST  Pt to schedule COVID swab 24-72 hrs prior to surgery  Pt to schedule appt with PMD to obtain medical clearance  Email Dr. Cortes regarding preop ASA, awaiting response    Problem: HTN (hypertension)  Assessment and Plan: Pt instructed to continue BP meds & take with sips of water in AM DOS    Problem: Diabetes mellitus  Assessment and Plan: Pt instructed to stop metformin 24hrs prior to surgery, 6/30/2020 AM dose    Problem: Seizure disorder  Assessment and Plan: Pt instructed to continue seizure medications & take with sips of water in AM DOS    Problem: AYALA (obstructive sleep apnea)  Assessment and Plan: By criteria, STOP BANG 5, OR booking notified    Problem: Need for prophylactic measure  Assessment and Plan: The Caprini score indicates this patient is at risk for a VTE event (score 3-5).  Most surgical patients in this group would benefit from pharmacologic prophylaxis.  The surgical team will determine the balance between VTE risk and bleeding risk

## 2020-06-18 NOTE — H&P PST ADULT - NEUROLOGICAL SYMPTOMS
d/t back injury/paresthesias/difficulty walking d/t lumbar radiculopathy/paresthesias/loss of sensation/difficulty walking

## 2020-06-19 LAB
A1C WITH ESTIMATED AVERAGE GLUCOSE RESULT: 7.4 % — HIGH (ref 4–5.6)
ESTIMATED AVERAGE GLUCOSE: 166 MG/DL — HIGH (ref 68–114)
MRSA PCR RESULT.: SIGNIFICANT CHANGE UP
S AUREUS DNA NOSE QL NAA+PROBE: SIGNIFICANT CHANGE UP

## 2020-07-01 ENCOUNTER — APPOINTMENT (OUTPATIENT)
Dept: SPINE | Facility: HOSPITAL | Age: 55
End: 2020-07-01

## 2020-07-06 PROBLEM — N40.0 BENIGN PROSTATIC HYPERPLASIA WITHOUT LOWER URINARY TRACT SYMPTOMS: Chronic | Status: ACTIVE | Noted: 2020-06-18

## 2020-07-06 PROBLEM — G40.909 EPILEPSY, UNSPECIFIED, NOT INTRACTABLE, WITHOUT STATUS EPILEPTICUS: Chronic | Status: ACTIVE | Noted: 2020-03-10

## 2020-07-10 DIAGNOSIS — Z01.818 ENCOUNTER FOR OTHER PREPROCEDURAL EXAMINATION: ICD-10-CM

## 2020-07-11 ENCOUNTER — APPOINTMENT (OUTPATIENT)
Dept: DISASTER EMERGENCY | Facility: CLINIC | Age: 55
End: 2020-07-11

## 2020-07-11 DIAGNOSIS — Z01.818 ENCOUNTER FOR OTHER PREPROCEDURAL EXAMINATION: ICD-10-CM

## 2020-07-13 ENCOUNTER — TRANSCRIPTION ENCOUNTER (OUTPATIENT)
Age: 55
End: 2020-07-13

## 2020-07-13 LAB — SARS-COV-2 N GENE NPH QL NAA+PROBE: NOT DETECTED

## 2020-07-14 ENCOUNTER — APPOINTMENT (OUTPATIENT)
Dept: SPINE | Facility: HOSPITAL | Age: 55
End: 2020-07-14

## 2020-07-14 ENCOUNTER — INPATIENT (INPATIENT)
Facility: HOSPITAL | Age: 55
LOS: 1 days | Discharge: LEFT BEFORE DISCHARGE | DRG: 454 | End: 2020-07-16
Attending: NEUROLOGICAL SURGERY | Admitting: NEUROLOGICAL SURGERY
Payer: MEDICAID

## 2020-07-14 VITALS
TEMPERATURE: 99 F | DIASTOLIC BLOOD PRESSURE: 87 MMHG | SYSTOLIC BLOOD PRESSURE: 153 MMHG | RESPIRATION RATE: 18 BRPM | HEART RATE: 74 BPM | WEIGHT: 223.11 LBS | HEIGHT: 74 IN

## 2020-07-14 DIAGNOSIS — Z98.89 OTHER SPECIFIED POSTPROCEDURAL STATES: Chronic | ICD-10-CM

## 2020-07-14 DIAGNOSIS — M51.26 OTHER INTERVERTEBRAL DISC DISPLACEMENT, LUMBAR REGION: ICD-10-CM

## 2020-07-14 LAB
BLD GP AB SCN SERPL QL: NEGATIVE — SIGNIFICANT CHANGE UP
GLUCOSE BLDC GLUCOMTR-MCNC: 123 MG/DL — HIGH (ref 70–99)
GLUCOSE BLDC GLUCOMTR-MCNC: 168 MG/DL — HIGH (ref 70–99)
GLUCOSE BLDC GLUCOMTR-MCNC: 176 MG/DL — HIGH (ref 70–99)
GLUCOSE BLDC GLUCOMTR-MCNC: 181 MG/DL — HIGH (ref 70–99)
GLUCOSE BLDC GLUCOMTR-MCNC: 223 MG/DL — HIGH (ref 70–99)
GLUCOSE BLDC GLUCOMTR-MCNC: 254 MG/DL — HIGH (ref 70–99)
RH IG SCN BLD-IMP: POSITIVE — SIGNIFICANT CHANGE UP

## 2020-07-14 PROCEDURE — 63047 LAM FACETEC & FORAMOT LUMBAR: CPT | Mod: 59,GC

## 2020-07-14 PROCEDURE — 22840 INSERT SPINE FIXATION DEVICE: CPT | Mod: GC

## 2020-07-14 PROCEDURE — 22633 ARTHRD CMBN 1NTRSPC LUMBAR: CPT | Mod: GC

## 2020-07-14 PROCEDURE — 22853 INSJ BIOMECHANICAL DEVICE: CPT | Mod: GC

## 2020-07-14 PROCEDURE — 72131 CT LUMBAR SPINE W/O DYE: CPT | Mod: 26

## 2020-07-14 RX ORDER — LAMOTRIGINE 25 MG/1
225 TABLET, ORALLY DISINTEGRATING ORAL
Refills: 0 | Status: DISCONTINUED | OUTPATIENT
Start: 2020-07-14 | End: 2020-07-16

## 2020-07-14 RX ORDER — AMLODIPINE BESYLATE 2.5 MG/1
2.5 TABLET ORAL DAILY
Refills: 0 | Status: DISCONTINUED | OUTPATIENT
Start: 2020-07-14 | End: 2020-07-16

## 2020-07-14 RX ORDER — MAGNESIUM HYDROXIDE 400 MG/1
30 TABLET, CHEWABLE ORAL EVERY 12 HOURS
Refills: 0 | Status: DISCONTINUED | OUTPATIENT
Start: 2020-07-14 | End: 2020-07-16

## 2020-07-14 RX ORDER — ATENOLOL 25 MG/1
50 TABLET ORAL DAILY
Refills: 0 | Status: DISCONTINUED | OUTPATIENT
Start: 2020-07-14 | End: 2020-07-16

## 2020-07-14 RX ORDER — LANOLIN ALCOHOL/MO/W.PET/CERES
3 CREAM (GRAM) TOPICAL AT BEDTIME
Refills: 0 | Status: DISCONTINUED | OUTPATIENT
Start: 2020-07-14 | End: 2020-07-16

## 2020-07-14 RX ORDER — INSULIN LISPRO 100/ML
VIAL (ML) SUBCUTANEOUS
Refills: 0 | Status: DISCONTINUED | OUTPATIENT
Start: 2020-07-14 | End: 2020-07-16

## 2020-07-14 RX ORDER — HALOPERIDOL DECANOATE 100 MG/ML
1 INJECTION INTRAMUSCULAR ONCE
Refills: 0 | Status: DISCONTINUED | OUTPATIENT
Start: 2020-07-14 | End: 2020-07-16

## 2020-07-14 RX ORDER — CHLORHEXIDINE GLUCONATE 213 G/1000ML
1 SOLUTION TOPICAL ONCE
Refills: 0 | Status: DISCONTINUED | OUTPATIENT
Start: 2020-07-14 | End: 2020-07-14

## 2020-07-14 RX ORDER — DEXTROSE 50 % IN WATER 50 %
15 SYRINGE (ML) INTRAVENOUS ONCE
Refills: 0 | Status: DISCONTINUED | OUTPATIENT
Start: 2020-07-14 | End: 2020-07-16

## 2020-07-14 RX ORDER — CLONAZEPAM 1 MG
2 TABLET ORAL
Refills: 0 | Status: DISCONTINUED | OUTPATIENT
Start: 2020-07-14 | End: 2020-07-16

## 2020-07-14 RX ORDER — CEFAZOLIN SODIUM 1 G
2000 VIAL (EA) INJECTION EVERY 8 HOURS
Refills: 0 | Status: COMPLETED | OUTPATIENT
Start: 2020-07-14 | End: 2020-07-15

## 2020-07-14 RX ORDER — DEXTROSE 50 % IN WATER 50 %
25 SYRINGE (ML) INTRAVENOUS ONCE
Refills: 0 | Status: DISCONTINUED | OUTPATIENT
Start: 2020-07-14 | End: 2020-07-16

## 2020-07-14 RX ORDER — SODIUM CHLORIDE 9 MG/ML
1000 INJECTION, SOLUTION INTRAVENOUS
Refills: 0 | Status: DISCONTINUED | OUTPATIENT
Start: 2020-07-14 | End: 2020-07-16

## 2020-07-14 RX ORDER — DEXTROSE 50 % IN WATER 50 %
12.5 SYRINGE (ML) INTRAVENOUS ONCE
Refills: 0 | Status: DISCONTINUED | OUTPATIENT
Start: 2020-07-14 | End: 2020-07-16

## 2020-07-14 RX ORDER — ASPIRIN/CALCIUM CARB/MAGNESIUM 324 MG
81 TABLET ORAL DAILY
Refills: 0 | Status: DISCONTINUED | OUTPATIENT
Start: 2020-07-14 | End: 2020-07-16

## 2020-07-14 RX ORDER — HYDROMORPHONE HYDROCHLORIDE 2 MG/ML
0.5 INJECTION INTRAMUSCULAR; INTRAVENOUS; SUBCUTANEOUS EVERY 4 HOURS
Refills: 0 | Status: DISCONTINUED | OUTPATIENT
Start: 2020-07-14 | End: 2020-07-15

## 2020-07-14 RX ORDER — ACETAMINOPHEN 500 MG
1000 TABLET ORAL ONCE
Refills: 0 | Status: COMPLETED | OUTPATIENT
Start: 2020-07-14 | End: 2020-07-14

## 2020-07-14 RX ORDER — LISINOPRIL 2.5 MG/1
10 TABLET ORAL DAILY
Refills: 0 | Status: DISCONTINUED | OUTPATIENT
Start: 2020-07-14 | End: 2020-07-16

## 2020-07-14 RX ORDER — OXYCODONE HYDROCHLORIDE 5 MG/1
10 TABLET ORAL EVERY 4 HOURS
Refills: 0 | Status: DISCONTINUED | OUTPATIENT
Start: 2020-07-14 | End: 2020-07-15

## 2020-07-14 RX ORDER — SODIUM CHLORIDE 9 MG/ML
3 INJECTION INTRAMUSCULAR; INTRAVENOUS; SUBCUTANEOUS EVERY 8 HOURS
Refills: 0 | Status: DISCONTINUED | OUTPATIENT
Start: 2020-07-14 | End: 2020-07-14

## 2020-07-14 RX ORDER — ATORVASTATIN CALCIUM 80 MG/1
80 TABLET, FILM COATED ORAL AT BEDTIME
Refills: 0 | Status: DISCONTINUED | OUTPATIENT
Start: 2020-07-15 | End: 2020-07-16

## 2020-07-14 RX ORDER — HYDROMORPHONE HYDROCHLORIDE 2 MG/ML
0.5 INJECTION INTRAMUSCULAR; INTRAVENOUS; SUBCUTANEOUS
Refills: 0 | Status: DISCONTINUED | OUTPATIENT
Start: 2020-07-14 | End: 2020-07-14

## 2020-07-14 RX ORDER — INSULIN LISPRO 100/ML
VIAL (ML) SUBCUTANEOUS AT BEDTIME
Refills: 0 | Status: DISCONTINUED | OUTPATIENT
Start: 2020-07-14 | End: 2020-07-16

## 2020-07-14 RX ORDER — GLUCAGON INJECTION, SOLUTION 0.5 MG/.1ML
1 INJECTION, SOLUTION SUBCUTANEOUS ONCE
Refills: 0 | Status: DISCONTINUED | OUTPATIENT
Start: 2020-07-14 | End: 2020-07-16

## 2020-07-14 RX ORDER — SENNA PLUS 8.6 MG/1
2 TABLET ORAL AT BEDTIME
Refills: 0 | Status: DISCONTINUED | OUTPATIENT
Start: 2020-07-14 | End: 2020-07-16

## 2020-07-14 RX ORDER — ONDANSETRON 8 MG/1
4 TABLET, FILM COATED ORAL EVERY 6 HOURS
Refills: 0 | Status: DISCONTINUED | OUTPATIENT
Start: 2020-07-14 | End: 2020-07-16

## 2020-07-14 RX ORDER — LIDOCAINE HCL 20 MG/ML
0.2 VIAL (ML) INJECTION ONCE
Refills: 0 | Status: DISCONTINUED | OUTPATIENT
Start: 2020-07-14 | End: 2020-07-14

## 2020-07-14 RX ORDER — HYDROMORPHONE HYDROCHLORIDE 2 MG/ML
1 INJECTION INTRAMUSCULAR; INTRAVENOUS; SUBCUTANEOUS
Refills: 0 | Status: DISCONTINUED | OUTPATIENT
Start: 2020-07-14 | End: 2020-07-14

## 2020-07-14 RX ADMIN — Medication 1000 MILLIGRAM(S): at 21:45

## 2020-07-14 RX ADMIN — OXYCODONE HYDROCHLORIDE 10 MILLIGRAM(S): 5 TABLET ORAL at 20:48

## 2020-07-14 RX ADMIN — ATORVASTATIN CALCIUM 80 MILLIGRAM(S): 80 TABLET, FILM COATED ORAL at 20:52

## 2020-07-14 RX ADMIN — Medication 2: at 23:06

## 2020-07-14 RX ADMIN — OXYCODONE HYDROCHLORIDE 10 MILLIGRAM(S): 5 TABLET ORAL at 16:33

## 2020-07-14 RX ADMIN — SODIUM CHLORIDE 75 MILLILITER(S): 9 INJECTION, SOLUTION INTRAVENOUS at 13:43

## 2020-07-14 RX ADMIN — LAMOTRIGINE 225 MILLIGRAM(S): 25 TABLET, ORALLY DISINTEGRATING ORAL at 17:19

## 2020-07-14 RX ADMIN — Medication 3 MILLIGRAM(S): at 22:40

## 2020-07-14 RX ADMIN — Medication 400 MILLIGRAM(S): at 20:50

## 2020-07-14 RX ADMIN — Medication 100 MILLIGRAM(S): at 20:51

## 2020-07-14 RX ADMIN — OXYCODONE HYDROCHLORIDE 10 MILLIGRAM(S): 5 TABLET ORAL at 21:45

## 2020-07-14 RX ADMIN — Medication 2: at 17:46

## 2020-07-14 RX ADMIN — OXYCODONE HYDROCHLORIDE 10 MILLIGRAM(S): 5 TABLET ORAL at 16:03

## 2020-07-14 RX ADMIN — Medication 4: at 15:13

## 2020-07-14 NOTE — PROGRESS NOTE ADULT - ASSESSMENT
53YO M hx of seizures, now s/p L4-L5 METRX PLIF w/ Dr. Cortes. Exam intact, reporting very little pain, only incisional. Has not required pain medication in PACU. Now being xferred to floor.   Exam: AOx3, PEERL, EOMI, no facial. TUCKER 5/5 no drift. No clonus. Barreto, no drains, no stephani.   - Complete 3 doses ancef postop  - Advance diet as tolerated  - q4h neuro checks  - Postop CT L spine in AM  - cyclobenzaprine, dilaudid, and oxycodone written for pain.

## 2020-07-14 NOTE — PHYSICAL THERAPY INITIAL EVALUATION ADULT - ADDITIONAL COMMENTS
Pt lives alone in an apt with elevator access. Pt was Ind with all ADLs and amb without AD. Pt is currently not working

## 2020-07-14 NOTE — PATIENT PROFILE ADULT - HEALTH LITERACY
Tranexamic Acid Counseling:  Patient advised of the small risk of bleeding problems with tranexamic acid. They were also instructed to call if they developed any nausea, vomiting or diarrhea. All of the patient's questions and concerns were addressed. no

## 2020-07-14 NOTE — PRE-ANESTHESIA EVALUATION ADULT - HEIGHT IN FEET
I have personally seen and examined this patient.  I have fully participated in the care of this patient. I have reviewed all pertinent clinical information, including history, physical exam, plan and the Resident’s note and agree except as noted. 6

## 2020-07-14 NOTE — PHYSICAL THERAPY INITIAL EVALUATION ADULT - GAIT DEVIATIONS NOTED, PT EVAL
decreased christina/decreased velocity of limb motion/decreased weight-shifting ability/decreased step length

## 2020-07-14 NOTE — PROGRESS NOTE ADULT - SUBJECTIVE AND OBJECTIVE BOX
Pt seen and examined at bedside in PACU. Reports minimal pain, has not required pain medication.     Vital Signs Last 24 Hrs  T(C): 36.1 (14 Jul 2020 15:00), Max: 37.1 (14 Jul 2020 06:58)  T(F): 97 (14 Jul 2020 15:00), Max: 98.8 (14 Jul 2020 06:58)  HR: 71 (14 Jul 2020 15:15) (64 - 74)  BP: 149/83 (14 Jul 2020 15:15) (111/62 - 153/87)  BP(mean): 101 (14 Jul 2020 14:15) (80 - 101)  RR: 16 (14 Jul 2020 15:15) (16 - 18)  SpO2: 98% (14 Jul 2020 15:15) (97% - 100%)    Exam: AOx3, PEERL, EOMI, no facial. TUCKER 5/5 no drift. No clonus. Barreto, no drains, no stephani.

## 2020-07-14 NOTE — PHYSICAL THERAPY INITIAL EVALUATION ADULT - PERTINENT HX OF CURRENT PROBLEM, REHAB EVAL
Pt is a 54 y.o male hx DM2, seizure disorder, lumbar spinal stenosis with radiculopathy now s/p sx as noted above.

## 2020-07-15 LAB
ANION GAP SERPL CALC-SCNC: 11 MMOL/L — SIGNIFICANT CHANGE UP (ref 5–17)
BASOPHILS # BLD AUTO: 0.01 K/UL — SIGNIFICANT CHANGE UP (ref 0–0.2)
BASOPHILS NFR BLD AUTO: 0.1 % — SIGNIFICANT CHANGE UP (ref 0–2)
BUN SERPL-MCNC: 14 MG/DL — SIGNIFICANT CHANGE UP (ref 7–23)
CALCIUM SERPL-MCNC: 9.3 MG/DL — SIGNIFICANT CHANGE UP (ref 8.4–10.5)
CHLORIDE SERPL-SCNC: 102 MMOL/L — SIGNIFICANT CHANGE UP (ref 96–108)
CO2 SERPL-SCNC: 24 MMOL/L — SIGNIFICANT CHANGE UP (ref 22–31)
CREAT SERPL-MCNC: 0.88 MG/DL — SIGNIFICANT CHANGE UP (ref 0.5–1.3)
EOSINOPHIL # BLD AUTO: 0.18 K/UL — SIGNIFICANT CHANGE UP (ref 0–0.5)
EOSINOPHIL NFR BLD AUTO: 1.7 % — SIGNIFICANT CHANGE UP (ref 0–6)
GLUCOSE BLDC GLUCOMTR-MCNC: 138 MG/DL — HIGH (ref 70–99)
GLUCOSE BLDC GLUCOMTR-MCNC: 144 MG/DL — HIGH (ref 70–99)
GLUCOSE BLDC GLUCOMTR-MCNC: 147 MG/DL — HIGH (ref 70–99)
GLUCOSE BLDC GLUCOMTR-MCNC: 207 MG/DL — HIGH (ref 70–99)
GLUCOSE SERPL-MCNC: 137 MG/DL — HIGH (ref 70–99)
HCT VFR BLD CALC: 37.9 % — LOW (ref 39–50)
HGB BLD-MCNC: 12.8 G/DL — LOW (ref 13–17)
IMM GRANULOCYTES NFR BLD AUTO: 0.4 % — SIGNIFICANT CHANGE UP (ref 0–1.5)
LYMPHOCYTES # BLD AUTO: 1.51 K/UL — SIGNIFICANT CHANGE UP (ref 1–3.3)
LYMPHOCYTES # BLD AUTO: 14.3 % — SIGNIFICANT CHANGE UP (ref 13–44)
MCHC RBC-ENTMCNC: 30.6 PG — SIGNIFICANT CHANGE UP (ref 27–34)
MCHC RBC-ENTMCNC: 33.8 GM/DL — SIGNIFICANT CHANGE UP (ref 32–36)
MCV RBC AUTO: 90.7 FL — SIGNIFICANT CHANGE UP (ref 80–100)
MONOCYTES # BLD AUTO: 0.94 K/UL — HIGH (ref 0–0.9)
MONOCYTES NFR BLD AUTO: 8.9 % — SIGNIFICANT CHANGE UP (ref 2–14)
NEUTROPHILS # BLD AUTO: 7.85 K/UL — HIGH (ref 1.8–7.4)
NEUTROPHILS NFR BLD AUTO: 74.6 % — SIGNIFICANT CHANGE UP (ref 43–77)
NRBC # BLD: 0 /100 WBCS — SIGNIFICANT CHANGE UP (ref 0–0)
PLATELET # BLD AUTO: 167 K/UL — SIGNIFICANT CHANGE UP (ref 150–400)
POTASSIUM SERPL-MCNC: 3.9 MMOL/L — SIGNIFICANT CHANGE UP (ref 3.5–5.3)
POTASSIUM SERPL-SCNC: 3.9 MMOL/L — SIGNIFICANT CHANGE UP (ref 3.5–5.3)
RBC # BLD: 4.18 M/UL — LOW (ref 4.2–5.8)
RBC # FLD: 13 % — SIGNIFICANT CHANGE UP (ref 10.3–14.5)
SODIUM SERPL-SCNC: 137 MMOL/L — SIGNIFICANT CHANGE UP (ref 135–145)
WBC # BLD: 10.53 K/UL — HIGH (ref 3.8–10.5)
WBC # FLD AUTO: 10.53 K/UL — HIGH (ref 3.8–10.5)

## 2020-07-15 RX ORDER — HYDROMORPHONE HYDROCHLORIDE 2 MG/ML
0.5 INJECTION INTRAMUSCULAR; INTRAVENOUS; SUBCUTANEOUS ONCE
Refills: 0 | Status: DISCONTINUED | OUTPATIENT
Start: 2020-07-15 | End: 2020-07-15

## 2020-07-15 RX ORDER — ACETAMINOPHEN 500 MG
1000 TABLET ORAL ONCE
Refills: 0 | Status: COMPLETED | OUTPATIENT
Start: 2020-07-15 | End: 2020-07-15

## 2020-07-15 RX ORDER — ENOXAPARIN SODIUM 100 MG/ML
40 INJECTION SUBCUTANEOUS
Refills: 0 | Status: DISCONTINUED | OUTPATIENT
Start: 2020-07-15 | End: 2020-07-16

## 2020-07-15 RX ORDER — OXYCODONE HYDROCHLORIDE 5 MG/1
10 TABLET ORAL EVERY 4 HOURS
Refills: 0 | Status: DISCONTINUED | OUTPATIENT
Start: 2020-07-15 | End: 2020-07-16

## 2020-07-15 RX ORDER — HYDROMORPHONE HYDROCHLORIDE 2 MG/ML
2 INJECTION INTRAMUSCULAR; INTRAVENOUS; SUBCUTANEOUS EVERY 4 HOURS
Refills: 0 | Status: DISCONTINUED | OUTPATIENT
Start: 2020-07-15 | End: 2020-07-15

## 2020-07-15 RX ORDER — HYDROMORPHONE HYDROCHLORIDE 2 MG/ML
4 INJECTION INTRAMUSCULAR; INTRAVENOUS; SUBCUTANEOUS EVERY 4 HOURS
Refills: 0 | Status: DISCONTINUED | OUTPATIENT
Start: 2020-07-15 | End: 2020-07-15

## 2020-07-15 RX ORDER — ACETAMINOPHEN 500 MG
650 TABLET ORAL EVERY 6 HOURS
Refills: 0 | Status: DISCONTINUED | OUTPATIENT
Start: 2020-07-15 | End: 2020-07-16

## 2020-07-15 RX ORDER — HYDROMORPHONE HYDROCHLORIDE 2 MG/ML
1 INJECTION INTRAMUSCULAR; INTRAVENOUS; SUBCUTANEOUS ONCE
Refills: 0 | Status: DISCONTINUED | OUTPATIENT
Start: 2020-07-15 | End: 2020-07-15

## 2020-07-15 RX ORDER — OXYCODONE HYDROCHLORIDE 5 MG/1
5 TABLET ORAL EVERY 4 HOURS
Refills: 0 | Status: DISCONTINUED | OUTPATIENT
Start: 2020-07-15 | End: 2020-07-16

## 2020-07-15 RX ADMIN — OXYCODONE HYDROCHLORIDE 10 MILLIGRAM(S): 5 TABLET ORAL at 00:38

## 2020-07-15 RX ADMIN — Medication 3 MILLIGRAM(S): at 21:46

## 2020-07-15 RX ADMIN — Medication 650 MILLIGRAM(S): at 23:02

## 2020-07-15 RX ADMIN — Medication 1000 MILLIGRAM(S): at 07:46

## 2020-07-15 RX ADMIN — OXYCODONE HYDROCHLORIDE 10 MILLIGRAM(S): 5 TABLET ORAL at 18:23

## 2020-07-15 RX ADMIN — Medication 1000 MILLIGRAM(S): at 17:00

## 2020-07-15 RX ADMIN — OXYCODONE HYDROCHLORIDE 10 MILLIGRAM(S): 5 TABLET ORAL at 23:02

## 2020-07-15 RX ADMIN — Medication 2 MILLIGRAM(S): at 07:31

## 2020-07-15 RX ADMIN — Medication 400 MILLIGRAM(S): at 07:31

## 2020-07-15 RX ADMIN — HYDROMORPHONE HYDROCHLORIDE 4 MILLIGRAM(S): 2 INJECTION INTRAMUSCULAR; INTRAVENOUS; SUBCUTANEOUS at 09:27

## 2020-07-15 RX ADMIN — HYDROMORPHONE HYDROCHLORIDE 4 MILLIGRAM(S): 2 INJECTION INTRAMUSCULAR; INTRAVENOUS; SUBCUTANEOUS at 14:11

## 2020-07-15 RX ADMIN — HYDROMORPHONE HYDROCHLORIDE 0.5 MILLIGRAM(S): 2 INJECTION INTRAMUSCULAR; INTRAVENOUS; SUBCUTANEOUS at 21:04

## 2020-07-15 RX ADMIN — OXYCODONE HYDROCHLORIDE 10 MILLIGRAM(S): 5 TABLET ORAL at 22:32

## 2020-07-15 RX ADMIN — HYDROMORPHONE HYDROCHLORIDE 0.5 MILLIGRAM(S): 2 INJECTION INTRAMUSCULAR; INTRAVENOUS; SUBCUTANEOUS at 20:49

## 2020-07-15 RX ADMIN — ENOXAPARIN SODIUM 40 MILLIGRAM(S): 100 INJECTION SUBCUTANEOUS at 17:03

## 2020-07-15 RX ADMIN — Medication 650 MILLIGRAM(S): at 22:34

## 2020-07-15 RX ADMIN — HYDROMORPHONE HYDROCHLORIDE 0.5 MILLIGRAM(S): 2 INJECTION INTRAMUSCULAR; INTRAVENOUS; SUBCUTANEOUS at 04:46

## 2020-07-15 RX ADMIN — HYDROMORPHONE HYDROCHLORIDE 0.5 MILLIGRAM(S): 2 INJECTION INTRAMUSCULAR; INTRAVENOUS; SUBCUTANEOUS at 05:15

## 2020-07-15 RX ADMIN — OXYCODONE HYDROCHLORIDE 10 MILLIGRAM(S): 5 TABLET ORAL at 01:30

## 2020-07-15 RX ADMIN — AMLODIPINE BESYLATE 2.5 MILLIGRAM(S): 2.5 TABLET ORAL at 06:05

## 2020-07-15 RX ADMIN — ATORVASTATIN CALCIUM 80 MILLIGRAM(S): 80 TABLET, FILM COATED ORAL at 21:47

## 2020-07-15 RX ADMIN — LAMOTRIGINE 225 MILLIGRAM(S): 25 TABLET, ORALLY DISINTEGRATING ORAL at 06:04

## 2020-07-15 RX ADMIN — Medication 81 MILLIGRAM(S): at 11:43

## 2020-07-15 RX ADMIN — LAMOTRIGINE 225 MILLIGRAM(S): 25 TABLET, ORALLY DISINTEGRATING ORAL at 17:03

## 2020-07-15 RX ADMIN — HYDROMORPHONE HYDROCHLORIDE 4 MILLIGRAM(S): 2 INJECTION INTRAMUSCULAR; INTRAVENOUS; SUBCUTANEOUS at 14:41

## 2020-07-15 RX ADMIN — SENNA PLUS 2 TABLET(S): 8.6 TABLET ORAL at 21:47

## 2020-07-15 RX ADMIN — ATENOLOL 50 MILLIGRAM(S): 25 TABLET ORAL at 06:05

## 2020-07-15 RX ADMIN — Medication 100 MILLIGRAM(S): at 06:05

## 2020-07-15 RX ADMIN — OXYCODONE HYDROCHLORIDE 10 MILLIGRAM(S): 5 TABLET ORAL at 06:06

## 2020-07-15 RX ADMIN — Medication 4: at 13:22

## 2020-07-15 RX ADMIN — LISINOPRIL 10 MILLIGRAM(S): 2.5 TABLET ORAL at 06:05

## 2020-07-15 RX ADMIN — OXYCODONE HYDROCHLORIDE 10 MILLIGRAM(S): 5 TABLET ORAL at 07:36

## 2020-07-15 RX ADMIN — HYDROMORPHONE HYDROCHLORIDE 4 MILLIGRAM(S): 2 INJECTION INTRAMUSCULAR; INTRAVENOUS; SUBCUTANEOUS at 08:48

## 2020-07-15 RX ADMIN — Medication 400 MILLIGRAM(S): at 16:45

## 2020-07-15 NOTE — CHART NOTE - NSCHARTNOTEFT_GEN_A_CORE
CAPRINI SCORE [CLOT] Score on Admission for     AGE RELATED RISK FACTORS                                                       MOBILITY RELATED FACTORS  [x ] Age 41-60 years                                            (1 Point)                  [ ] Bed rest                                                        (1 Point)  [ ] Age: 61-74 years                                           (2 Points)                 [ ] Plaster cast                                                   (2 Points)  [ ] Age= 75 years                                              (3 Points)                 [ ] Bed bound for more than 72 hours                 (2 Points)    DISEASE RELATED RISK FACTORS                                               GENDER SPECIFIC FACTORS  [ ] Edema in the lower extremities                       (1 Point)                  [ ] Pregnancy                                                     (1 Point)  [ ] Varicose veins                                               (1 Point)                  [ ] Post-partum < 6 weeks                                   (1 Point)             [ x] BMI > 25 Kg/m2                                            (1 Point)                  [ ] Hormonal therapy  or oral contraception          (1 Point)                 [ ] Sepsis (in the previous month)                        (1 Point)                  [ ] History of pregnancy complications                 (1 point)  [ ] Pneumonia or serious lung disease                                               [ ] Unexplained or recurrent                     (1 Point)           (in the previous month)                               (1 Point)  [ ] Abnormal pulmonary function test                     (1 Point)                 SURGERY RELATED RISK FACTORS (include planned surgeries)  [ ] Acute myocardial infarction                              (1 Point)                 [ ]  Section                                             (1 Point)  [ ] Congestive heart failure (in the previous month)  (1 Point)         [ ] Minor surgery                                                  (1 Point)   [ ] Inflammatory bowel disease                             (1 Point)                 [ ] Arthroscopic surgery                                        (2 Points)  [ ] Central venous access                                      (2 Points)                [ ] General surgery lasting more than 45 minutes   (2 Points)       [ ] Stroke (in the previous month)                          (5 Points)               [ ] Elective arthroplasty                                         (5 Points)            [ ] current or past malignancy                              (2 Points)                                                                                                       HEMATOLOGY RELATED FACTORS                                                 TRAUMA RELATED RISK FACTORS  [ ] Prior episodes of VTE                                     (3 Points)                [ ] Fracture of the hip, pelvis, or leg                       (5 Points)  [ ] Positive family history for VTE                         (3 Points)                 [ ] Acute spinal cord injury (in the previous month)  (5 Points)  [ ] Prothrombin 26207 A                                     (3 Points)                 [ ] Paralysis  (less than 1 month)                             (5 Points)  [ ] Factor V Leiden                                             (3 Points)                  [ ] Multiple Trauma within 1 month                        (5 Points)  [ ] Lupus anticoagulants                                     (3 Points)                                                           [ ] Anticardiolipin antibodies                               (3 Points)                                                       [ ] High homocysteine in the blood                      (3 Points)                                             [ ] Other congenital or acquired thrombophilia      (3 Points)                                                [ ] Heparin induced thrombocytopenia                  (3 Points)                                          Total Score [    3      ]    Risk:  Very low 0   Low 1 to 2   Moderate 3 to 4   High =5       VTE Prophylasix Recommednations:  [ x] mechanical pneumatic compression devices                                      [ ] contraindicated: _____________________  [ x] chemo prophylasix                                                                                   [ ] contraindicated _____________________    **** HIGH LIKELIHOOD DVT PRESENT ON ADMISSION  [ ] (please order LE dopplers within 24 hours of admission)

## 2020-07-15 NOTE — PROVIDER CONTACT NOTE (OTHER) - ACTION/TREATMENT ORDERED:
IV Tylenol 1g, Pain medication adjusted back to- [id number], reassessed temp after IV Tylenol, will monitor closely.

## 2020-07-15 NOTE — PROGRESS NOTE ADULT - SUBJECTIVE AND OBJECTIVE BOX
SUBJECTIVE: Patient seen and examined at bedside earlier this morning. Complained of extreme pain 10/10 not well controlled on current pain regimen.     OVERNIGHT EVENTS: none    Vital Signs Last 24 Hrs  T(C): 36.8 (15 Jul 2020 13:27), Max: 38 (15 Jul 2020 05:01)  T(F): 98.2 (15 Jul 2020 13:27), Max: 100.4 (15 Jul 2020 05:01)  HR: 88 (15 Jul 2020 13:27) (69 - 105)  BP: 114/69 (15 Jul 2020 13:27) (114/69 - 158/81)  BP(mean): 101 (14 Jul 2020 14:15) (101 - 101)  RR: 16 (15 Jul 2020 13:27) (16 - 18)  SpO2: 98% (15 Jul 2020 13:27) (94% - 100%)    PHYSICAL EXAM:    General: No Acute Distress     Neurological: Awake, alert oriented to person, place and time, Following Commands, PERRL, EOMI, Face Symmetrical, Speech Fluent, Moving all extremities, Muscle Strength normal in all four extremities, No Drift, Sensation to Light Touch Intact    Pulmonary: Clear to Auscultation, No Rales, No Rhonchi, No Wheezes     Cardiovascular: S1, S2, Regular Rate and Rhythm     Gastrointestinal: Soft, Nontender, Nondistended     Incision: c/d/i     LABS:                        12.8   10.53 )-----------( 167      ( 15 Jul 2020 06:46 )             37.9    07-15    137  |  102  |  14  ----------------------------<  137<H>  3.9   |  24  |  0.88    Ca    9.3      15 Jul 2020 06:46          07-14 @ 07:01  -  07-15 @ 07:00  --------------------------------------------------------  IN: 1550 mL / OUT: 1825 mL / NET: -275 mL    07-15 @ 07:01  -  07-15 @ 14:08  --------------------------------------------------------  IN: 840 mL / OUT: 400 mL / NET: 440 mL      MEDICATIONS  (STANDING):  amLODIPine   Tablet 2.5 milliGRAM(s) Oral daily  aspirin  chewable 81 milliGRAM(s) Oral daily  ATENolol  Tablet 50 milliGRAM(s) Oral daily  atorvastatin 80 milliGRAM(s) Oral at bedtime  dextrose 5%. 1000 milliLiter(s) (50 mL/Hr) IV Continuous <Continuous>  dextrose 50% Injectable 12.5 Gram(s) IV Push once  dextrose 50% Injectable 25 Gram(s) IV Push once  dextrose 50% Injectable 25 Gram(s) IV Push once  insulin lispro (HumaLOG) corrective regimen sliding scale   SubCutaneous three times a day before meals  insulin lispro (HumaLOG) corrective regimen sliding scale   SubCutaneous at bedtime  lactated ringers. 1000 milliLiter(s) (75 mL/Hr) IV Continuous <Continuous>  lamoTRIgine 225 milliGRAM(s) Oral two times a day  lisinopril 10 milliGRAM(s) Oral daily  melatonin 3 milliGRAM(s) Oral at bedtime  senna 2 Tablet(s) Oral at bedtime  tamsulosin 0.4 milliGRAM(s) Oral at bedtime    MEDICATIONS  (PRN):  clonazePAM  Tablet 2 milliGRAM(s) Oral two times a day PRN anxiety  cyclobenzaprine 10 milliGRAM(s) Oral every 8 hours PRN msucle spasms  dextrose 40% Gel 15 Gram(s) Oral once PRN Blood Glucose LESS THAN 70 milliGRAM(s)/deciliter  glucagon  Injectable 1 milliGRAM(s) IntraMuscular once PRN Glucose LESS THAN 70 milligrams/deciliter  haloperidol    Injectable 1 milliGRAM(s) IV Push once PRN nausea  HYDROmorphone   Tablet 2 milliGRAM(s) Oral every 4 hours PRN Moderate Pain (4 - 6)  HYDROmorphone   Tablet 4 milliGRAM(s) Oral every 4 hours PRN Severe Pain (7 - 10)  magnesium hydroxide Suspension 30 milliLiter(s) Oral every 12 hours PRN Constipation  ondansetron Injectable 4 milliGRAM(s) IV Push every 6 hours PRN Nausea        DIET: CCD diet     IMAGING: < from: CT Lumbar Spine No Cont (07.14.20 @ 16:44) >    INTERPRETATION:  Clinical indication: Status post spinal surgery.    Multiple axial sections were performed through the lumbar spine region. Coronal and sagittal constructions were performed.    This exam is compared with prior preop CT scan lumbar spine performed on March 7, 2020.    Postop changes compatible with bilateral laminectomy at the L4-5 level is seen with evidence of a discectomy. There is also evidence of posterior spinal fusion seen extending from L4 to L5 with bony postop fragments seen in the posterior paraspinal soft tissues at this level. Evaluation of the disc spacer as well as the metallic and osseous postop material appears adequately placed.     Please note evaluation of the spinal canal is limited at the L4-5 level by metallic hardware. Small amount of air is identified in the spinal canal just related postop changes.    T11-12: Normal    T12-L1: Normal.    L1-2: Normal.    L2-3: Disc bulge and bilateral hypertrophic facet joint changes are seen. Mild to moderate narrowing of the spinal canal is seen.    L3-4: Disc bulge and bilateral hypertrophic facet changes are seen. Moderate narrowing of the spinal canal is seen.    L4-5: Evaluation of this level is limited by artifact.    L5-S1: Disc bulge and bilateral hypertrophic facet changes are seen. Mild narrowing of the spinal canal and both neural foramen.    Evaluation of the paraspinal soft tissues demonstrate postoperative changes described above.    IMPRESSION: Postop changes as described above.    < end of copied text >

## 2020-07-15 NOTE — PROVIDER CONTACT NOTE (OTHER) - ASSESSMENT
Temp 101.3, vitals other wise stable, pt restless. Moving from chair to bed, irritated. Pt reports being cold, blankets and heat packs given

## 2020-07-15 NOTE — PROGRESS NOTE ADULT - ASSESSMENT
54 year old male PMH seizure disorder, HTN, DM, cervical disease and s/p C5-C7 ACDF on 10/03/12. Pt c/o mild neck pain which is tolerable. Also reports left leg and back pain, radiating into his left foot, with numbness & tingling in left anterior/medial calf region with walking difficulty. MRI shows foraminal stenosis due to a disc herniation at L4-5 on the left side. Originally scheduled for surgery 3/25/2020 but cancelled d/t COVID pandemic, now scheduled for L4-5 ANTERIOR FAR LATERAL LUMBAR DISCECTOMY POSSIBLE FUSION on 7/1/2020. Denies any palpitations, SOB, N/V, fever or chills.     PROCEDURE: 7/15 s/p L4-L5 METRX PLIF  POD#1    PLAN:  -Neuro: pain not well controlled with Oxy. Will try dilaudid po for better pain contorol  -Flexeril for muscle spasms  -Encouraged incentive spirometry  -Will dc corbett and patient will be TOV.  -Continue atenolol and norvasc for hypertension  -Continue home meds  -Regular diet  -Bowel regimen  -DVT ppx: venodynes and sql  -PT: outpatient PT when stable    Will discuss above with Dr. Sebastian Kan #[id number]       Assessment:  Please Check When Present   []  GCS  E   V  M     Heart Failure: []Acute, [] acute on chronic , []chronic  Heart Failure:  [] Diastolic (HFpEF), [] Systolic (HFrEF), []Combined (HFpEF and HFrEF), [] RHF, [] Pulm HTN, [] Other    [] DONTRELL, [] ATN, [] AIN, [] other  [] CKD1, [] CKD2, [] CKD 3, [] CKD 4, [] CKD 5, []ESRD    Encephalopathy: [] Metabolic, [] Hepatic, [] toxic, [] Neurological, [] Other    Abnormal Nurtitional Status: [] malnurtition (see nutrition note), [ ]underweight: BMI < 19, [] morbid obesity: BMI >40, [] Cachexia    [] Sepsis  [] hypovolemic shock,[] cardiogenic shock, [] hemorrhagic shock, [] neuogenic shock  [] Acute Respiratory Failure  []Cerebral edema, [] Brain compression/ herniation,   [] Functional quadriplegia  [] Acute blood loss anemia

## 2020-07-15 NOTE — PROVIDER CONTACT NOTE (OTHER) - SITUATION
pt became irritable/anxious as the day progressed, states "pain services was suppose to come back and talk to me" pt restless, irritable, spiked a fever, reported being "freezing" CHARLETTE Rucker aware

## 2020-07-16 ENCOUNTER — TRANSCRIPTION ENCOUNTER (OUTPATIENT)
Age: 55
End: 2020-07-16

## 2020-07-16 VITALS
DIASTOLIC BLOOD PRESSURE: 83 MMHG | SYSTOLIC BLOOD PRESSURE: 152 MMHG | OXYGEN SATURATION: 96 % | RESPIRATION RATE: 16 BRPM | HEART RATE: 98 BPM | TEMPERATURE: 99 F

## 2020-07-16 DIAGNOSIS — G40.909 EPILEPSY, UNSPECIFIED, NOT INTRACTABLE, WITHOUT STATUS EPILEPTICUS: ICD-10-CM

## 2020-07-16 DIAGNOSIS — R33.9 RETENTION OF URINE, UNSPECIFIED: ICD-10-CM

## 2020-07-16 DIAGNOSIS — M54.16 RADICULOPATHY, LUMBAR REGION: ICD-10-CM

## 2020-07-16 DIAGNOSIS — I10 ESSENTIAL (PRIMARY) HYPERTENSION: ICD-10-CM

## 2020-07-16 DIAGNOSIS — E11.9 TYPE 2 DIABETES MELLITUS WITHOUT COMPLICATIONS: ICD-10-CM

## 2020-07-16 DIAGNOSIS — E78.5 HYPERLIPIDEMIA, UNSPECIFIED: ICD-10-CM

## 2020-07-16 DIAGNOSIS — R50.9 FEVER, UNSPECIFIED: ICD-10-CM

## 2020-07-16 DIAGNOSIS — Z29.9 ENCOUNTER FOR PROPHYLACTIC MEASURES, UNSPECIFIED: ICD-10-CM

## 2020-07-16 LAB
ANION GAP SERPL CALC-SCNC: 11 MMOL/L — SIGNIFICANT CHANGE UP (ref 5–17)
APPEARANCE UR: ABNORMAL
BACTERIA # UR AUTO: NEGATIVE — SIGNIFICANT CHANGE UP
BILIRUB UR-MCNC: NEGATIVE — SIGNIFICANT CHANGE UP
BUN SERPL-MCNC: 11 MG/DL — SIGNIFICANT CHANGE UP (ref 7–23)
CALCIUM SERPL-MCNC: 8.9 MG/DL — SIGNIFICANT CHANGE UP (ref 8.4–10.5)
CHLORIDE SERPL-SCNC: 100 MMOL/L — SIGNIFICANT CHANGE UP (ref 96–108)
CO2 SERPL-SCNC: 24 MMOL/L — SIGNIFICANT CHANGE UP (ref 22–31)
COLOR SPEC: YELLOW — SIGNIFICANT CHANGE UP
CREAT SERPL-MCNC: 0.75 MG/DL — SIGNIFICANT CHANGE UP (ref 0.5–1.3)
DIFF PNL FLD: ABNORMAL
EPI CELLS # UR: 0 /HPF — SIGNIFICANT CHANGE UP
GLUCOSE BLDC GLUCOMTR-MCNC: 146 MG/DL — HIGH (ref 70–99)
GLUCOSE BLDC GLUCOMTR-MCNC: 166 MG/DL — HIGH (ref 70–99)
GLUCOSE SERPL-MCNC: 160 MG/DL — HIGH (ref 70–99)
GLUCOSE UR QL: ABNORMAL
HCT VFR BLD CALC: 33.4 % — LOW (ref 39–50)
HGB BLD-MCNC: 11.4 G/DL — LOW (ref 13–17)
HYALINE CASTS # UR AUTO: 1 /LPF — SIGNIFICANT CHANGE UP (ref 0–2)
KETONES UR-MCNC: ABNORMAL
LEUKOCYTE ESTERASE UR-ACNC: NEGATIVE — SIGNIFICANT CHANGE UP
MCHC RBC-ENTMCNC: 31.1 PG — SIGNIFICANT CHANGE UP (ref 27–34)
MCHC RBC-ENTMCNC: 34.1 GM/DL — SIGNIFICANT CHANGE UP (ref 32–36)
MCV RBC AUTO: 91 FL — SIGNIFICANT CHANGE UP (ref 80–100)
NITRITE UR-MCNC: NEGATIVE — SIGNIFICANT CHANGE UP
NRBC # BLD: 0 /100 WBCS — SIGNIFICANT CHANGE UP (ref 0–0)
PH UR: 6.5 — SIGNIFICANT CHANGE UP (ref 5–8)
PLATELET # BLD AUTO: 139 K/UL — LOW (ref 150–400)
POTASSIUM SERPL-MCNC: 3.9 MMOL/L — SIGNIFICANT CHANGE UP (ref 3.5–5.3)
POTASSIUM SERPL-SCNC: 3.9 MMOL/L — SIGNIFICANT CHANGE UP (ref 3.5–5.3)
PROT UR-MCNC: ABNORMAL
RBC # BLD: 3.67 M/UL — LOW (ref 4.2–5.8)
RBC # FLD: 13.1 % — SIGNIFICANT CHANGE UP (ref 10.3–14.5)
RBC CASTS # UR COMP ASSIST: 178 /HPF — HIGH (ref 0–4)
SODIUM SERPL-SCNC: 135 MMOL/L — SIGNIFICANT CHANGE UP (ref 135–145)
SP GR SPEC: 1.02 — SIGNIFICANT CHANGE UP (ref 1.01–1.02)
UROBILINOGEN FLD QL: NEGATIVE — SIGNIFICANT CHANGE UP
WBC # BLD: 11.57 K/UL — HIGH (ref 3.8–10.5)
WBC # FLD AUTO: 11.57 K/UL — HIGH (ref 3.8–10.5)
WBC UR QL: 3 /HPF — SIGNIFICANT CHANGE UP (ref 0–5)

## 2020-07-16 PROCEDURE — C1889: CPT

## 2020-07-16 PROCEDURE — C1769: CPT

## 2020-07-16 PROCEDURE — 80048 BASIC METABOLIC PNL TOTAL CA: CPT

## 2020-07-16 PROCEDURE — 97161 PT EVAL LOW COMPLEX 20 MIN: CPT

## 2020-07-16 PROCEDURE — 71045 X-RAY EXAM CHEST 1 VIEW: CPT | Mod: 26

## 2020-07-16 PROCEDURE — 99222 1ST HOSP IP/OBS MODERATE 55: CPT

## 2020-07-16 PROCEDURE — 85027 COMPLETE CBC AUTOMATED: CPT

## 2020-07-16 PROCEDURE — 86901 BLOOD TYPING SEROLOGIC RH(D): CPT

## 2020-07-16 PROCEDURE — 82962 GLUCOSE BLOOD TEST: CPT

## 2020-07-16 PROCEDURE — 97116 GAIT TRAINING THERAPY: CPT

## 2020-07-16 PROCEDURE — 71045 X-RAY EXAM CHEST 1 VIEW: CPT

## 2020-07-16 PROCEDURE — C1713: CPT

## 2020-07-16 PROCEDURE — 99223 1ST HOSP IP/OBS HIGH 75: CPT

## 2020-07-16 PROCEDURE — 81001 URINALYSIS AUTO W/SCOPE: CPT

## 2020-07-16 PROCEDURE — 86900 BLOOD TYPING SEROLOGIC ABO: CPT

## 2020-07-16 PROCEDURE — 87040 BLOOD CULTURE FOR BACTERIA: CPT

## 2020-07-16 PROCEDURE — 97110 THERAPEUTIC EXERCISES: CPT

## 2020-07-16 PROCEDURE — 72131 CT LUMBAR SPINE W/O DYE: CPT

## 2020-07-16 PROCEDURE — 76000 FLUOROSCOPY <1 HR PHYS/QHP: CPT

## 2020-07-16 PROCEDURE — 86850 RBC ANTIBODY SCREEN: CPT

## 2020-07-16 RX ORDER — SENNA PLUS 8.6 MG/1
2 TABLET ORAL
Qty: 0 | Refills: 0 | DISCHARGE
Start: 2020-07-16

## 2020-07-16 RX ORDER — TAMSULOSIN HYDROCHLORIDE 0.4 MG/1
1 CAPSULE ORAL
Qty: 0 | Refills: 0 | DISCHARGE

## 2020-07-16 RX ORDER — TAMSULOSIN HYDROCHLORIDE 0.4 MG/1
2 CAPSULE ORAL
Qty: 30 | Refills: 0
Start: 2020-07-16

## 2020-07-16 RX ORDER — METHOCARBAMOL 500 MG/1
1000 TABLET, FILM COATED ORAL EVERY 6 HOURS
Refills: 0 | Status: DISCONTINUED | OUTPATIENT
Start: 2020-07-16 | End: 2020-07-16

## 2020-07-16 RX ORDER — TAMSULOSIN HYDROCHLORIDE 0.4 MG/1
0.8 CAPSULE ORAL AT BEDTIME
Refills: 0 | Status: DISCONTINUED | OUTPATIENT
Start: 2020-07-16 | End: 2020-07-16

## 2020-07-16 RX ORDER — HYDROMORPHONE HYDROCHLORIDE 2 MG/ML
0.5 INJECTION INTRAMUSCULAR; INTRAVENOUS; SUBCUTANEOUS ONCE
Refills: 0 | Status: DISCONTINUED | OUTPATIENT
Start: 2020-07-16 | End: 2020-07-16

## 2020-07-16 RX ORDER — MAGNESIUM HYDROXIDE 400 MG/1
30 TABLET, CHEWABLE ORAL
Qty: 0 | Refills: 0 | DISCHARGE
Start: 2020-07-16

## 2020-07-16 RX ADMIN — Medication 81 MILLIGRAM(S): at 11:27

## 2020-07-16 RX ADMIN — LAMOTRIGINE 225 MILLIGRAM(S): 25 TABLET, ORALLY DISINTEGRATING ORAL at 05:28

## 2020-07-16 RX ADMIN — HYDROMORPHONE HYDROCHLORIDE 0.5 MILLIGRAM(S): 2 INJECTION INTRAMUSCULAR; INTRAVENOUS; SUBCUTANEOUS at 00:45

## 2020-07-16 RX ADMIN — AMLODIPINE BESYLATE 2.5 MILLIGRAM(S): 2.5 TABLET ORAL at 05:28

## 2020-07-16 RX ADMIN — OXYCODONE HYDROCHLORIDE 10 MILLIGRAM(S): 5 TABLET ORAL at 10:14

## 2020-07-16 RX ADMIN — OXYCODONE HYDROCHLORIDE 10 MILLIGRAM(S): 5 TABLET ORAL at 14:01

## 2020-07-16 RX ADMIN — HYDROMORPHONE HYDROCHLORIDE 0.5 MILLIGRAM(S): 2 INJECTION INTRAMUSCULAR; INTRAVENOUS; SUBCUTANEOUS at 01:00

## 2020-07-16 RX ADMIN — OXYCODONE HYDROCHLORIDE 10 MILLIGRAM(S): 5 TABLET ORAL at 14:31

## 2020-07-16 RX ADMIN — Medication 2: at 09:58

## 2020-07-16 RX ADMIN — OXYCODONE HYDROCHLORIDE 10 MILLIGRAM(S): 5 TABLET ORAL at 03:12

## 2020-07-16 RX ADMIN — OXYCODONE HYDROCHLORIDE 10 MILLIGRAM(S): 5 TABLET ORAL at 09:44

## 2020-07-16 RX ADMIN — OXYCODONE HYDROCHLORIDE 10 MILLIGRAM(S): 5 TABLET ORAL at 03:42

## 2020-07-16 RX ADMIN — SODIUM CHLORIDE 75 MILLILITER(S): 9 INJECTION, SOLUTION INTRAVENOUS at 05:29

## 2020-07-16 RX ADMIN — ATENOLOL 50 MILLIGRAM(S): 25 TABLET ORAL at 05:28

## 2020-07-16 RX ADMIN — LISINOPRIL 10 MILLIGRAM(S): 2.5 TABLET ORAL at 05:28

## 2020-07-16 NOTE — PROVIDER CONTACT NOTE (OTHER) - ACTION/TREATMENT ORDERED:
MD made aware & ordered to straight cath pt x1. ~700 mL drained & pt DTV @ 0500. MD also ordered Dilaudid 0.5mg IVP x1.

## 2020-07-16 NOTE — DISCHARGE NOTE PROVIDER - NSDCFUSCHEDAPPT_GEN_ALL_CORE_FT
[facility] ; 08/28/2020 ; NPP Neuro [facility] ; 08/28/2020 ; NPP Neuro [facility]

## 2020-07-16 NOTE — CONSULT NOTE ADULT - ATTENDING COMMENTS
Dr. Keri Chiang  Division of Hospital Medicine  [facility]   Pager: [phone]

## 2020-07-16 NOTE — PROVIDER CONTACT NOTE (OTHER) - ASSESSMENT
Pt DTV @ 2000 as per day RN. Pt unable to void @ this time. Bladder scan showed ~600 mL in bladder. Pt denies urge to urinate, does appear to be distended but is also unable to use other forms of toileting d/t increased pain when being moved in bed. Barreto was D/C'ed on prev shift. Pt encouraged to use urinal to try to urinate but pt states "Im in too much pain to think of peeing right now." Pt received Oxy 10mg on prev shift but cont to c/o pain. No orders for breakthrough pain meds @ this time.

## 2020-07-16 NOTE — CONSULT NOTE ADULT - PROBLEM SELECTOR RECOMMENDATION 4
SBP elevated to 150s-160s. suspect largely due to pain  - pain control as above  - c/w home amlodipine 2.5mg   - c/w atenolol 50mg daily  - c/w lisinopril equivalent of benzapril 10mg daily  - can increase amlodipine to 5mg if SBP still persistently >140s despite adequate pain control

## 2020-07-16 NOTE — CONSULT NOTE ADULT - PROBLEM SELECTOR RECOMMENDATION 9
s/p L4-L5 METRX PLIF  - states pain poorly controlled. seen by pain management  - pain control as per pain management  - rest as per NSGY s/p L4-L5 METRX PLIF  - states pain poorly controlled. seen by pain management earlier, recs noted  - pain control as per pain management recs  - rest as per NSGY

## 2020-07-16 NOTE — DISCHARGE NOTE NURSING/CASE MANAGEMENT/SOCIAL WORK - PATIENT PORTAL LINK FT
You can access the FollowMyHealth Patient Portal offered by [facility] by registering at the following website: [email or web address]. By joining [facility]’s FollowMyHealth portal, you will also be able to view your health information using other applications (apps) compatible with our system.

## 2020-07-16 NOTE — PROVIDER CONTACT NOTE (OTHER) - ACTION/TREATMENT ORDERED:
MD @ bedside & talk with pt. MD ordered additional Dilaudid 0.5mg IVP x1 & Flexeril also given. Pt educated on given pain medications time to work & encouraged use of relaxation techniques.

## 2020-07-16 NOTE — DISCHARGE NOTE PROVIDER - NSDCACTIVITY_GEN_ALL_CORE
No heavy lifting/straining/Do not drive or operate machinery/Walking - Indoors allowed/Do not make important decisions/Walking - Outdoors allowed

## 2020-07-16 NOTE — DISCHARGE NOTE PROVIDER - CARE PROVIDER_API CALL
Homero Cortes  NEUROSURGERY  [address] [place] [id number]  Phone: [phone]  Fax: [phone]  Follow Up Time:

## 2020-07-16 NOTE — CONSULT NOTE ADULT - SUBJECTIVE AND OBJECTIVE BOX
Chief Complaint:  Patient is a 54y old  Male who presents with low back pain.    HPI:  54 year old male PMH seizure disorder, HTN, DM, cervical disease and s/p C5-C7 ACDF on 10/03/12. Pt c/o mild neck pain which is tolerable. Also reports left leg and back pain, radiating into his left foot, with numbness & tingling in left anterior/medial calf region with walking difficulty. MRI shows foraminal stenosis due to a disc herniation at L4-5 on the left side. Originally scheduled for surgery 3/25/2020 but cancelled d/t COVID pandemic, now S/P L4-5 PLIF.    Current Pain Score: 10/10    Current out- patient pain regimen: Homero Leonard MD    Out Patient Pain Management provider: percocet 10/ 325 QID PRN    [place] Prescription Monitoring [place]: Reference #[id number]    PAST MEDICAL & SURGICAL HISTORY:  Enlargement (benign) of prostate  Lumbar radiculopathy  Seizure disorder: generalized ,   Since 2007, last seizure Fall 2018  Seizure: Since 2007, last seizure Fall 2018  Cervical disc displacement  Cervical disc disorder with radiculopathy  Cervical disc disease with myelopathy  Hypercholesteremia  Benign Hypertension  Depressive disorder, not elsewhere classified  Type II or unspecified type diabetes mellitus without mention of complication, not stated as uncontr  Diabetes  H/O cervical spine surgery: 2010  Status Post Carpal Tunnel Release: bilateral    FAMILY HISTORY:  Family history of seizures (Aunt)    SOCIAL HISTORY:  Tobacco Use: denies  Alcohol Use: denies  Illcicit Drug Use: marijuana    Opioid Risk Tool (ORT-OUD) Score: low    Allergies    No Known Allergies    Intolerances    none    REVIEW OF SYSTEMS:  CONSTITUTIONAL: No fever, weight loss, fatigue, falls  NEURO: No headaches, memory loss, loss of strength, tremors, dizziness or blurred vision  RESP: No shortness of breath, cough  CV: No chest pain, palpitations  GI: No abdominal pain, nausea, vomiting, diarrhea, constipation   : No dysuria, bladder incontinence/retention  MSK: No joint pain or swelling; + neck and low back pain with left lower extremity pain; denies upper or lower motor strength weakness; denies saddle anesthesia   SKIN: No itching, burning, rashes   PSYCHIATRIC: No depression, anxiety, mood swings, or difficulty sleeping; + agitation      MEDICATIONS  (STANDING):  amLODIPine   Tablet 2.5 milliGRAM(s) Oral daily  aspirin  chewable 81 milliGRAM(s) Oral daily  ATENolol  Tablet 50 milliGRAM(s) Oral daily  atorvastatin 80 milliGRAM(s) Oral at bedtime  dextrose 5%. 1000 milliLiter(s) (50 mL/Hr) IV Continuous <Continuous>  dextrose 50% Injectable 12.5 Gram(s) IV Push once  dextrose 50% Injectable 25 Gram(s) IV Push once  dextrose 50% Injectable 25 Gram(s) IV Push once  enoxaparin Injectable 40 milliGRAM(s) SubCutaneous <User Schedule>  insulin lispro (HumaLOG) corrective regimen sliding scale   SubCutaneous three times a day before meals  insulin lispro (HumaLOG) corrective regimen sliding scale   SubCutaneous at bedtime  lactated ringers. 1000 milliLiter(s) (75 mL/Hr) IV Continuous <Continuous>  lamoTRIgine 225 milliGRAM(s) Oral two times a day  lisinopril 10 milliGRAM(s) Oral daily  melatonin 3 milliGRAM(s) Oral at bedtime  senna 2 Tablet(s) Oral at bedtime  tamsulosin 0.4 milliGRAM(s) Oral at bedtime    MEDICATIONS  (PRN):  acetaminophen   Tablet .. 650 milliGRAM(s) Oral every 6 hours PRN Mild Pain (1 - 3)  clonazePAM  Tablet 2 milliGRAM(s) Oral two times a day PRN anxiety  cyclobenzaprine 10 milliGRAM(s) Oral every 8 hours PRN msucle spasms  dextrose 40% Gel 15 Gram(s) Oral once PRN Blood Glucose LESS THAN 70 milliGRAM(s)/deciliter  glucagon  Injectable 1 milliGRAM(s) IntraMuscular once PRN Glucose LESS THAN 70 milligrams/deciliter  haloperidol    Injectable 1 milliGRAM(s) IV Push once PRN nausea  magnesium hydroxide Suspension 30 milliLiter(s) Oral every 12 hours PRN Constipation  ondansetron Injectable 4 milliGRAM(s) IV Push every 6 hours PRN Nausea  oxyCODONE    IR 10 milliGRAM(s) Oral every 4 hours PRN Severe Pain (7 - 10)  oxyCODONE    IR 5 milliGRAM(s) Oral every 4 hours PRN Moderate Pain (4 - 6)      PHYSICAL EXAM  GENERAL: seen at bedside; moderate distress; well developed   HEENT: head atraumatic, normocephalic; PERRLA; EOMs intact; tongue midline; speech clear  NEURO: A + O X 3; poor concentration; Cranial Nerves II- XII intact; sensory exam intact  RESPIRATORY: lungs clear to auscultation B/L; no rales or rhonchi; chest expansion equal  CARDIAC: regular cardiac rhythm S1 S2; no JVD  GI: abdomen soft, non distended; + bowel sounds   : has been straight cath'd twice since corbett D/C'd, is due to void  EXTREMITIES/ PV: TUCKER equally; 2+ peripheral pulses; no cyanosis, edema or clubbing  MUSCULOSKELETAL: motor strength 5/5 B/L lower extremities; states he has not been OOB yet  SKIN: no rashes, lesions   PSYCH: affect restricted; poor eye contact; appears agitated and restless    Vital Signs:  T(C): 37.4 (07-16-20 @ 13:43)  HR: 98 (07-16-20 @ 13:43)  BP: 152/83 (07-16-20 @ 13:43)  RR: 16 (07-16-20 @ 13:43)  SpO2: 96% (07-16-20 @ 13:43)    Pertinent labs/radiology:  07-16    135  |  100  |  11  ----------------------------<  160<H>  3.9   |  24  |  0.75    Ca    8.9      16 Jul 2020 07:03                          11.4   11.57 )-----------( 139      ( 16 Jul 2020 07:03 )             33.4

## 2020-07-16 NOTE — CONSULT NOTE ADULT - ASSESSMENT
54 year old male with PMH of seizure disorder, HTN, T2DM, cervical disease s/p C5-C7 ACDF on 10/2012 who presents with worsening left leg and low back pain radiating into his left foot associated with numbness and tingling in left anterior medial calf region with difficulty walking. MRI showing foraminal stenosis due to disc herniation at L4-L5 on left side. Originally scheduled for surgery on 3/25/2020 but cancelled due to COVID pandemic who presented for surgery now s/p L4-L5 METRX PLIF on 7/14/20. Hospital course c/b by post-op fever, urinary retention, and uncontrolled pain. Medicine consulted for fever.

## 2020-07-16 NOTE — CONSULT NOTE ADULT - PROBLEM SELECTOR RECOMMENDATION 2
Tmax 101.3 on 7/15 with overall fever trend coming down. no cough, SOB, n/v/d. +constipation +urinary retention. no clear sign/symptoms of infection at this time.   - UA reviewed: not concerning for infection  - f/u Urine Cx, blood cultures sent on 7/15  - f/u procal - ordered  - CXR with atelectasis more predominantly on the left  - encourage incentive spirometer use  - hold off abx at this time, monitor fever curve  - f/u duplex of lower extremities to r/o DVT

## 2020-07-16 NOTE — PROGRESS NOTE ADULT - ASSESSMENT
54 year old male PMH seizure disorder, HTN, DM, cervical disease and s/p C5-C7 ACDF on 10/03/12. Pt c/o mild neck pain which is tolerable. Also reports left leg and back pain, radiating into his left foot, with numbness & tingling in left anterior/medial calf region with walking difficulty. MRI shows foraminal stenosis due to a disc herniation at L4-5 on the left side. Originally scheduled for surgery 3/25/2020 but cancelled d/t COVID pandemic, now scheduled for L4-5 ANTERIOR FAR LATERAL LUMBAR DISCECTOMY POSSIBLE FUSION on 7/1/2020. Denies any palpitations, SOB, N/V, fever or chills.     PROCEDURE: 7/15 s/p L4-L5 METRX PLIF  POD#2    PLAN:  -Neuro: pain not well controlled. Chronic pain called. Recommend 1) Discontinue oxycodone 5 mg PRN. 2) Continue oxycodone 10 mg Q 4 hours PRN pain. 3) Would discontinue flexeril and start robaxin 1000 mg po Q 6 hours x 24 hours, and then continue the robaxin PRN.  -Fever of 38.5. Fever workup in progress. UA negative.   -Hospitalist called. Follow up appreciated.   -Encouraged incentive spirometry  -Had to be straight cath x2. Might require corbett. Continue flomax for retention   -Continue atenolol and norvasc for hypertension  -Continue home meds  -Regular diet  -Bowel regimen  -DVT ppx: venodynes and sql  -PT: outpatient PT when stable    Above discussed with Dr. Sebastian Kan #[id number]       Assessment:  Please Check When Present   []  GCS  E   V  M     Heart Failure: []Acute, [] acute on chronic , []chronic  Heart Failure:  [] Diastolic (HFpEF), [] Systolic (HFrEF), []Combined (HFpEF and HFrEF), [] RHF, [] Pulm HTN, [] Other    [] DONTRELL, [] ATN, [] AIN, [] other  [] CKD1, [] CKD2, [] CKD 3, [] CKD 4, [] CKD 5, []ESRD    Encephalopathy: [] Metabolic, [] Hepatic, [] toxic, [] Neurological, [] Other    Abnormal Nurtitional Status: [] malnurtition (see nutrition note), [ ]underweight: BMI < 19, [] morbid obesity: BMI >40, [] Cachexia    [] Sepsis  [] hypovolemic shock,[] cardiogenic shock, [] hemorrhagic shock, [] neuogenic shock  [] Acute Respiratory Failure  []Cerebral edema, [] Brain compression/ herniation,   [] Functional quadriplegia  [] Acute blood loss anemia

## 2020-07-16 NOTE — CONSULT NOTE ADULT - PROBLEM SELECTOR RECOMMENDATION 3
s/p corbett catheter removal. has enlarged prostate already on tamsulosin at home  - increase tamsulosin to 0.8mg qHS  - bladder scan q6h and straight cath if needed  - can add finasteride 5mg tomorrow   - would help if he could mobilize more but limited due to pain

## 2020-07-16 NOTE — PROGRESS NOTE ADULT - SUBJECTIVE AND OBJECTIVE BOX
SUBJECTIVE: Patient seen and examined at bedside earlier this morning. Complained of extreme pain 10/10 not well controlled on current pain regimen. Chronic pain called. Very uncooperative with exam      OVERNIGHT EVENTS: fever, had to be straight cath x 2     Vital Signs Last 24 Hrs  T(C): 37.4 (16 Jul 2020 13:43), Max: 38.5 (15 Jul 2020 16:45)  T(F): 99.3 (16 Jul 2020 13:43), Max: 101.3 (15 Jul 2020 16:45)  HR: 98 (16 Jul 2020 13:43) (91 - 103)  BP: 152/83 (16 Jul 2020 13:43) (112/65 - 169/76)  BP(mean): --  RR: 16 (16 Jul 2020 13:43) (16 - 18)  SpO2: 96% (16 Jul 2020 13:43) (96% - 98%)    PHYSICAL EXAM:    General: No Acute Distress     Neurological: Awake, alert oriented to person, place and time, Following Commands, PERRL, EOMI, Face Symmetrical, Speech Fluent, Moving all extremities, Muscle Strength normal in all four extremities, No Drift, Sensation to Light Touch Intact    Pulmonary: Clear to Auscultation, No Rales, No Rhonchi, No Wheezes     Cardiovascular: S1, S2, Regular Rate and Rhythm              Gastrointestinal: Soft, Nontender, Nondistended     Incision: c/d/i     LABS:                    11.4   11.57 )-----------( 139      ( 16 Jul 2020 07:03 )             33.4     	07-16    135  |  100  |  11  ----------------------------<  160<H>  3.9   |  24  |  0.75    Ca    8.9      16 Jul 2020 07:03      MEDICATIONS  (STANDING):  amLODIPine   Tablet 2.5 milliGRAM(s) Oral daily  aspirin  chewable 81 milliGRAM(s) Oral daily  ATENolol  Tablet 50 milliGRAM(s) Oral daily  atorvastatin 80 milliGRAM(s) Oral at bedtime  dextrose 5%. 1000 milliLiter(s) (50 mL/Hr) IV Continuous <Continuous>  dextrose 50% Injectable 12.5 Gram(s) IV Push once  dextrose 50% Injectable 25 Gram(s) IV Push once  dextrose 50% Injectable 25 Gram(s) IV Push once  enoxaparin Injectable 40 milliGRAM(s) SubCutaneous <User Schedule>  insulin lispro (HumaLOG) corrective regimen sliding scale   SubCutaneous three times a day before meals  insulin lispro (HumaLOG) corrective regimen sliding scale   SubCutaneous at bedtime  lactated ringers. 1000 milliLiter(s) (75 mL/Hr) IV Continuous <Continuous>  lamoTRIgine 225 milliGRAM(s) Oral two times a day  lisinopril 10 milliGRAM(s) Oral daily  melatonin 3 milliGRAM(s) Oral at bedtime  methocarbamol 1000 milliGRAM(s) Oral every 6 hours  senna 2 Tablet(s) Oral at bedtime  tamsulosin 0.4 milliGRAM(s) Oral at bedtime    MEDICATIONS  (PRN):  clonazePAM  Tablet 2 milliGRAM(s) Oral two times a day PRN anxiety  dextrose 40% Gel 15 Gram(s) Oral once PRN Blood Glucose LESS THAN 70 milliGRAM(s)/deciliter  glucagon  Injectable 1 milliGRAM(s) IntraMuscular once PRN Glucose LESS THAN 70 milligrams/deciliter  haloperidol    Injectable 1 milliGRAM(s) IV Push once PRN nausea  magnesium hydroxide Suspension 30 milliLiter(s) Oral every 12 hours PRN Constipation  ondansetron Injectable 4 milliGRAM(s) IV Push every 6 hours PRN Nausea  oxyCODONE    IR 10 milliGRAM(s) Oral every 4 hours PRN Severe Pain (7 - 10)          DIET: CCD diet     IMAGING:     < from: Xray Chest 1 View- PORTABLE-Urgent (07.16.20 @ 09:46) >    IMPRESSION:  Left basilar linear atelectasis/scarring. Lungs otherwise clear    < end of copied text >

## 2020-07-16 NOTE — DISCHARGE NOTE PROVIDER - HOSPITAL COURSE
54 year old male PMH seizure disorder, HTN, DM, cervical disease and s/p C5-C7 ACDF on 10/03/12. Pt c/o mild neck pain which is tolerable. Also reports left leg and back pain, radiating into his left foot, with numbness & tingling in left anterior/medial calf region with walking difficulty. MRI shows foraminal stenosis due to a disc herniation at L4-5 on the left side. Originally scheduled for surgery 3/25/2020 but cancelled d/t COVID pandemic, now scheduled for L4-5 ANTERIOR FAR LATERAL LUMBAR DISCECTOMY POSSIBLE FUSION on 7/1/2020. Denies any palpitations, SOB, N/V, fever or chills. He is s/p L4-L5 METRX PLIF on 7/14. His post of course complicated by fever and urinary retention. Patient very uncooperative while in the hospital often cursing and swearing at staff. Patient notified that he is not medically cleared for discharge and he notes he "wants to leave now" Nurse manager Jason got AMA notice signed from patient and he notes all responsibility noting he is NOT cleared to be discharged home. Dr. Cortes and neurosurgery resident notified that patient signing out AMA. 54 year old male PMH seizure disorder, HTN, DM, cervical disease and s/p C5-C7 ACDF on 10/03/12. Pt c/o mild neck pain which is tolerable. Also reports left leg and back pain, radiating into his left foot, with numbness & tingling in left anterior/medial calf region with walking difficulty. MRI shows foraminal stenosis due to a disc herniation at L4-5 on the left side. Originally scheduled for surgery 3/25/2020 but cancelled d/t COVID pandemic, now scheduled for L4-5 ANTERIOR FAR LATERAL LUMBAR DISCECTOMY POSSIBLE FUSION on 7/1/2020. Denies any palpitations, SOB, N/V, fever or chills. He is s/p L4-L5 METRX PLIF on 7/14. His post of course complicated by fever and urinary retention. Patient very uncooperative while in the hospital often cursing and swearing at staff. Patient notified that he is not medically cleared for discharge and he notes he "wants to leave now" Nurse manager Jason got AMA notice signed from patient and he notes all responsibility noting he is NOT cleared to be discharged home. Dr. Cortes and neurosurgery resident notified that patient signing out AMA. Patient notes he does not need any pain meds and "let me just go home "

## 2020-07-16 NOTE — PROVIDER CONTACT NOTE (OTHER) - ASSESSMENT
Pt w/mult complaints on unrelieved pain after receiving Dilaudid 0.5mg IVP x1 before straight cath. Tylenol 650mg & Oxy 10mg given w/no change in pain. Pt states "You need to give me something to knock me the f@*k out. Nothing you're giving me is working & I'm in so much pain." Emotional support provided. Pt repositioned as needed & heat packs provided to lower back w/relief in pain.

## 2020-07-16 NOTE — CONSULT NOTE ADULT - ASSESSMENT
54 year old male PMH seizure disorder, HTN, DM, cervical disease and s/p C5-C7 ACDF on 10/03/12. Pt c/o mild neck pain which is tolerable. Also reports left leg and back pain, radiating into his left foot, with numbness & tingling in left anterior/medial calf region with walking difficulty. MRI shows foraminal stenosis due to a disc herniation at L4-5 on the left side. Originally scheduled for surgery 3/25/2020 but cancelled d/t COVID pandemic, now S/P L4-5 PLIF.    Plan:  Discontinue oxycodone 5 mg PRN.  Continue oxycodone 10 mg Q 4 hours PRN pain.  Would discontinue flexeril and start robaxin 1000 mg po Q 6 hours x 24 hours, and then continue the robaxin PRN.    Pt appears very agitated and restless, urine toxicology immunoassay and metabolite confirmation ordered.  Discussed with RN.    Monitor for sedation and respiratory depression.  Bowel regimen.  OOB with PT.      Minutes spent on total encounter: 45 minutes      [facility]  [phone]

## 2020-07-16 NOTE — DISCHARGE NOTE PROVIDER - NSDCCPCAREPLAN_GEN_ALL_CORE_FT
PRINCIPAL DISCHARGE DIAGNOSIS  Diagnosis: Lumbar radiculopathy  Assessment and Plan of Treatment: 7/14 s/p L4-5 metrix plif  Please follow up Neurosurgeon in one week. Please call office to make appointment. Please follow up with Primary Care Physician. Please call office to make appointment.      SECONDARY DISCHARGE DIAGNOSES  Diagnosis: Urinary retention  Assessment and Plan of Treatment: Please make an appointment for follow up with your primary care physician after discharge.    Diagnosis: Seizure disorder  Assessment and Plan of Treatment: Please make an appointment for follow up with your primary care physician after discharge.    Diagnosis: Hypertension  Assessment and Plan of Treatment: Please make an appointment for follow up with your primary care physician after discharge.    Diagnosis: Hyperlipidemia  Assessment and Plan of Treatment: Please make an appointment for follow up with your primary care physician after discharge.

## 2020-07-16 NOTE — CONSULT NOTE ADULT - SUBJECTIVE AND OBJECTIVE BOX
[facility] Division of Hospital Medicine  Keri Chiang MD  Pager ([facility], 4W-5Z): [phone]  Other Times:  [phone]    PMD:     Patient is a 54y old  Male who presents with a chief complaint of low back pain (2020 14:48)      HPI/Hospital course: 54 year old male with PMH of seizure disorder, HTN, T2DM, cervical disease s/p C5-C7 ACDF on 10/2012 who presents with worsening left leg and low back pain radiating into his left foot associated with numbness and tingling in left anterior medial calf region with difficulty walking. MRI showing foraminal stenosis due to disc herniation at L4-L5 on left side. Originally scheduled for surgery on 3/25/2020 but cancelled due to COVID pandemic who presented for surgery now s/p L4-L5 METRX PLIF. Hospital course c/b by post-op fever, urinary retention, and uncontrolled pain.    Interval History: patient seen and examined. states unaware he had the fevers yesterday and overnight until informed by RN. denies chills, sweats, cough, SOB, abd pain, n/v/d, nor dysuria. +constipation, last BM 2 days ago. +urinary retention requiring straight cath today. states low back and L left pain poorly controlled currently at this time and unable to find a comfortable position.    Allergies  No Known Allergies  Intolerances: none        HOME MEDICATIONS: [x] Reviewed   Home Medications:  amlodipine-benazepril 2.5 mg-10 mg oral capsule: 1 cap(s) orally once a day (2020 06:56)  aspirin 81 mg oral tablet: 1 tab(s) orally once a day (2020 06:56)  atenolol 50 mg oral tablet: 1 tab(s) orally once a day (2020 06:56)  clonazePAM 2 mg oral tablet: 1 cap(s) orally 2 times a day, As Needed (2020 06:56)  lamoTRIgine: 225 milligram(s) orally 2 times a day (2020 06:56)  metFORMIN 500 mg oral tablet, extended release: 2  orally 2 times a day (2020 06:56)  Multi Vitamin+: 1 cap(s) orally once a day (2020 06:56)  oxyCODONE-acetaminophen 10 mg-325 mg oral tablet: 1 tab(s) orally every 6 hours, As Needed (2020 06:56)  rosuvastatin 20 mg oral tablet: 1 tab(s) orally once a day (at bedtime) (2020 06:56)  tamsulosin 0.4 mg oral capsule: 1 cap(s) orally once a day (2020 06:56)      MEDICATIONS  (STANDING):  amLODIPine   Tablet 2.5 milliGRAM(s) Oral daily  aspirin  chewable 81 milliGRAM(s) Oral daily  ATENolol  Tablet 50 milliGRAM(s) Oral daily  atorvastatin 80 milliGRAM(s) Oral at bedtime  dextrose 5%. 1000 milliLiter(s) (50 mL/Hr) IV Continuous <Continuous>  dextrose 50% Injectable 12.5 Gram(s) IV Push once  dextrose 50% Injectable 25 Gram(s) IV Push once  dextrose 50% Injectable 25 Gram(s) IV Push once  enoxaparin Injectable 40 milliGRAM(s) SubCutaneous <User Schedule>  insulin lispro (HumaLOG) corrective regimen sliding scale   SubCutaneous three times a day before meals  insulin lispro (HumaLOG) corrective regimen sliding scale   SubCutaneous at bedtime  lactated ringers. 1000 milliLiter(s) (75 mL/Hr) IV Continuous <Continuous>  lamoTRIgine 225 milliGRAM(s) Oral two times a day  lisinopril 10 milliGRAM(s) Oral daily  melatonin 3 milliGRAM(s) Oral at bedtime  methocarbamol 1000 milliGRAM(s) Oral every 6 hours  senna 2 Tablet(s) Oral at bedtime  tamsulosin 0.4 milliGRAM(s) Oral at bedtime    MEDICATIONS  (PRN):  clonazePAM  Tablet 2 milliGRAM(s) Oral two times a day PRN anxiety  dextrose 40% Gel 15 Gram(s) Oral once PRN Blood Glucose LESS THAN 70 milliGRAM(s)/deciliter  glucagon  Injectable 1 milliGRAM(s) IntraMuscular once PRN Glucose LESS THAN 70 milligrams/deciliter  haloperidol    Injectable 1 milliGRAM(s) IV Push once PRN nausea  magnesium hydroxide Suspension 30 milliLiter(s) Oral every 12 hours PRN Constipation  ondansetron Injectable 4 milliGRAM(s) IV Push every 6 hours PRN Nausea  oxyCODONE    IR 10 milliGRAM(s) Oral every 4 hours PRN Severe Pain (7 - 10)      PAST MEDICAL & SURGICAL HISTORY:  Enlargement (benign) of prostate  Lumbar radiculopathy  Seizure disorder: generalized ,   Since , last seizure Fall   Seizure: Since , last seizure Fall   Cervical disc displacement  Cervical disc disorder with radiculopathy  Cervical disc disease with myelopathy  Hypercholesteremia  Benign Hypertension  Depressive disorder, not elsewhere classified  Type II or unspecified type diabetes mellitus without mention of complication, not stated as uncontr  Diabetes  H/O cervical spine surgery: 2010  Status Post Carpal Tunnel Release: bilateral  [x] Reviewed     SOCIAL HISTORY:  Never smoker, denies EtOH use  retired  now retired due to work related injury    FAMILY HISTORY:  Family history of seizures (Aunt)      REVIEW OF SYSTEMS:    CONSTITUTIONAL: +fever, no chills  EYES: No eye pain, visual disturbances, or discharge  ENMT:  No difficulty hearing, tinnitus, vertigo; No sinus or throat pain  NECK: +chronic neck pain, mild  RESPIRATORY: No cough, wheezing, chills or hemoptysis; No shortness of breath  CARDIOVASCULAR: No chest pain, palpitations, dizziness, or leg swelling  GASTROINTESTINAL: No abdominal or epigastric pain. No nausea, vomiting, or hematemesis; No diarrhea. No melena or hematochezia. + constipation  GENITOURINARY: No dysuria, frequency, hematuria, or incontinence, +urinary retention  NEUROLOGICAL: No headaches, memory loss, loss of strength, numbness, or tremors  SKIN: No itching, burning, rashes, or lesions   LYMPH NODES: No enlarged glands  ENDOCRINE: No heat or cold intolerance; No hair loss  MUSCULOSKELETAL: +low back pain, +"nerve pain" in his calves b/l  PSYCHIATRIC: No depression, anxiety, mood swings, or difficulty sleeping  HEME/LYMPH: No easy bruising, or bleeding gums      PHYSICAL EXAM:  Vital Signs Last 24 Hrs  T(C): 37.4 (2020 13:43), Max: 38.5 (15 Jul 2020 16:45)  T(F): 99.3 (2020 13:43), Max: 101.3 (15 Jul 2020 16:45)  HR: 98 (2020 13:43) (91 - 103)  BP: 152/83 (2020 13:43) (112/65 - 169/76)  BP(mean): --  RR: 16 (2020 13:43) (16 - 18)  SpO2: 96% (2020 13:43) (96% - 98%)    CONSTITUTIONAL: sitting upright in chair, uncomfortable appearing with frequent repositioning  EYES: PERRLA; conjunctiva and sclera clear  ENMT: Moist oral mucosa, no pharyngeal injection or exudates; normal dentition  NECK: Supple, no JVD  RESPIRATORY: Normal respiratory effort; lungs are clear to auscultation bilaterally, no wheeze nor crackles  CARDIOVASCULAR: Regular rate and rhythm, normal S1 and S2, no murmur/rub/gallop; No lower extremity edema; Peripheral pulses are 2+ bilaterally  ABDOMEN: Soft, Nondistended,  Nontender to palpation, normoactive bowel sounds  MUSCULOSKELETAL:  No clubbing or cyanosis of digits; +tenderness to palpation of calves b/l L>R, no swelling nor erythema  PSYCH: A+O to person, place, and time; affect appropriate  NEUROLOGY: CN 2-12 are intact and symmetric; no gross sensory deficits   SKIN: No rashes; no palpable lesions    LABS:                        11.4   11.57 )-----------( 139      ( 2020 07:03 )             33.4     07-16    135  |  100  |  11  ----------------------------<  160<H>  3.9   |  24  |  0.75    Ca    8.9      2020 07:03        Urinalysis Basic - ( 2020 09:46 )    Color: Yellow / Appearance: Slightly Turbid / S.025 / pH: x  Gluc: x / Ketone: Small  / Bili: Negative / Urobili: Negative   Blood: x / Protein: 30 mg/dL / Nitrite: Negative   Leuk Esterase: Negative / RBC: 178 /hpf / WBC 3 /HPF   Sq Epi: x / Non Sq Epi: 0 /hpf / Bacteria: Negative          RADIOLOGY & ADDITIONAL TESTS:  Results Reviewed:  H/H stable, slight uptrend in WBC to 11.57K from 10.53K, FS at goal, UA RBCs, glucose, ketones  Imaging Personally Reviewed:  CXR personally reviewed - low lung volumes due to poor inspiratory effort L basilar atelectasis, no pleural effusions nor obvious infiltrate  Electrocardiogram Personally Reviewed:    COORDINATION OF CARE:  Care Discussed with Consultants/Other Providers [Y]: Neurosurgery CHARLETTE Damon  Prior or Outpatient Records Reviewed [Y/N]:

## 2020-07-21 LAB
CULTURE RESULTS: SIGNIFICANT CHANGE UP
CULTURE RESULTS: SIGNIFICANT CHANGE UP
SPECIMEN SOURCE: SIGNIFICANT CHANGE UP
SPECIMEN SOURCE: SIGNIFICANT CHANGE UP

## 2020-07-24 ENCOUNTER — APPOINTMENT (OUTPATIENT)
Dept: SPINE | Facility: CLINIC | Age: 55
End: 2020-07-24
Payer: MEDICAID

## 2020-07-24 VITALS
WEIGHT: 220 LBS | OXYGEN SATURATION: 98 % | HEIGHT: 74 IN | TEMPERATURE: 36.5 F | DIASTOLIC BLOOD PRESSURE: 70 MMHG | RESPIRATION RATE: 18 BRPM | SYSTOLIC BLOOD PRESSURE: 113 MMHG | HEART RATE: 85 BPM | BODY MASS INDEX: 28.23 KG/M2

## 2020-07-24 PROCEDURE — 99024 POSTOP FOLLOW-UP VISIT: CPT

## 2020-07-24 NOTE — HISTORY OF PRESENT ILLNESS
[FreeTextEntry1] : 54 year old male 10 days post op  following a lumbar L4 L 5 fusion.  Mr. Ramos suffered from back pain and left leg radicular pain with a L4 L5 herniated disc refractory to non surgical measures.  He is reporting  resolution of tingling and numbness of his left leg but he remains with bilateral leg pain, weakness,  and back pain. He is  utilizing a walker and able to go up and downstairs.  He does have trouble with transitioning and getting in and out of bed.  \par \par Two lower back  incisions that run parallel show intact skin and free from drainage and erythema.  All staples are intact.  Small seroma is noted above both incisions and non tender.  He has been on long standing Oxycodone for years due to chronic back pain and recently  his PCP  from COVID and he is no longer able to renew his Oxycodone.  His able to perform his tasks for  the day independently.

## 2020-07-24 NOTE — REASON FOR VISIT
[Other: _____] : [unfilled] [de-identified] : 7/14/2020 [de-identified] : 2 [de-identified] : 10 [de-identified] : L4 L5 left complete facetectomy for decompression of nerve root.  L4 L5 interbody onlay arthrodesis with non segmental screw odalis fixation and titanium interbody prosthesis

## 2020-07-24 NOTE — PHYSICAL EXAM
[General Appearance - Alert] : alert [General Appearance - Well Nourished] : well nourished [General Appearance - In No Acute Distress] : in no acute distress [General Appearance - Well Developed] : well developed [Clean] : clean [Dry] : dry [Healing Well] : healing well [Intact] : intact [No Drainage] : without drainage [Normal Skin] : normal [Oriented To Time, Place, And Person] : oriented to person, place, and time [Impaired Insight] : insight and judgment were intact [Affect] : the affect was normal [Mood] : the mood was normal [Person] : oriented to person [Place] : oriented to place [Time] : oriented to time [Short Term Intact] : short term memory intact [Remote Intact] : remote memory intact [Registration Intact] : recent registration memory intact [Concentration Intact] : normal concentrating ability [Span Intact] : the attention span was normal [Comprehension] : comprehension intact [Fluency] : fluency intact [Visual Intact] : visual attention was ~T not ~L decreased [Reading] : reading intact [Current Events] : adequate knowledge of current events [Past History] : adequate knowledge of personal past history [Vocabulary] : adequate range of vocabulary [Cranial Nerves Accessory (XI - Cranial And Spinal)] : head turning and shoulder shrug symmetric [Motor Strength] : muscle strength was normal in all four extremities [Motor Tone] : muscle tone was normal in all four extremities [Sensation Tactile Decrease] : light touch was intact [Involuntary Movements] : no involuntary movements were seen [No Visual Abnormalities] : no visible abnormailities [Outer Ear] : the ears and nose were normal in appearance [Sclera] : the sclera and conjunctiva were normal [Abnormal Walk] : normal gait [] : no respiratory distress [Skin Color & Pigmentation] : normal skin color and pigmentation [Erythema] : not erythematous [Tender] : not tender [Warm] : not warm [FreeTextEntry1] : parallel lower back incisions  [FreeTextEntry6] : staples removed  [FreeTextEntry8] : Slight unsteady balance issues

## 2020-07-24 NOTE — REVIEW OF SYSTEMS
[Leg Weakness] : leg weakness [Difficulty Walking] : difficulty walking [Negative] : Heme/Lymph [de-identified] : lower back pain \par bilateral leg pain

## 2020-07-24 NOTE — ASSESSMENT
[FreeTextEntry1] : 10 days post L4 L5 lumbar fusion and recovering well.  Resolution of left leg radicular pain and chronic leg and back pain. He will continue to increase his walking.  No lifting over ten pounds and avoid bending, twisting.  He will begin PT with restrictions of no BLT.  Pain management was provided and  short term  use of Oxycodone was prescribed.  A shower chair was prescribed and a hand grabber from medical supply.  Dulcolax was suggested for constipation and he was encouraged to increase fiber and fluids.  Flexeril will be renewed.  In two to three weeks he will return with a lumbar AP and lateral xray in follow up.    '\par \par Addendum:  after checking ISTOP the patient has had oxycodone ordered by Dr Homero Leonard his PCP who is in practice and he was instructed to follow up with him for oxycodone and he will see pain management.

## 2020-08-17 ENCOUNTER — APPOINTMENT (OUTPATIENT)
Dept: SPINE | Facility: CLINIC | Age: 55
End: 2020-08-17
Payer: MEDICAID

## 2020-08-17 VITALS
WEIGHT: 220 LBS | DIASTOLIC BLOOD PRESSURE: 82 MMHG | OXYGEN SATURATION: 98 % | RESPIRATION RATE: 14 BRPM | HEART RATE: 78 BPM | TEMPERATURE: 97.5 F | HEIGHT: 74 IN | BODY MASS INDEX: 28.23 KG/M2 | SYSTOLIC BLOOD PRESSURE: 134 MMHG

## 2020-08-17 PROCEDURE — 99024 POSTOP FOLLOW-UP VISIT: CPT

## 2020-08-17 NOTE — HISTORY OF PRESENT ILLNESS
[FreeTextEntry1] : One month ago Mr. Ramos underwent a L4 L5 decompression and fusion.  He has significantly improved and is now walking freely and no longer requires a walker.  He is returning back to his activity and a lumbar xray shows good alignment and  construct of hardware.  He has neck and arm numbness that we evaluated in the past and his pain is increasing.

## 2020-08-17 NOTE — ASSESSMENT
[FreeTextEntry1] : 54 year old male with a lumbar herniated disc and s/p L4 L5 lumbar fusion.  He is pain free today and his incision is well healed.  He will follow up  with pain management for chronic use of opioids.  he will undergo a MRI, CT and flexion/extension xrays of cervical spine for chronic neck pain.  In three months he  will return with lumbar xrays for review.

## 2020-08-17 NOTE — PHYSICAL EXAM
[Clean] : clean [Dry] : dry [Well-Healed] : well-healed [Intact] : intact [No Drainage] : without drainage [Erythema] : not erythematous [Normal Skin] : normal [Warm] : not warm [Normal Skin Turgor] : skin turgor was normal [FreeTextEntry1] : lower back  [Oriented To Time, Place, And Person] : oriented to person, place, and time [Mood] : the mood was normal [Affect] : the affect was normal [Impaired Insight] : insight and judgment were intact [Person] : oriented to person [Place] : oriented to place [Short Term Intact] : short term memory intact [Time] : oriented to time [Remote Intact] : remote memory intact [Registration Intact] : recent registration memory intact [Span Intact] : the attention span was normal [Concentration Intact] : normal concentrating ability [Fluency] : fluency intact [Visual Intact] : visual attention was ~T not ~L decreased [Reading] : reading intact [Comprehension] : comprehension intact [Past History] : adequate knowledge of personal past history [Vocabulary] : adequate range of vocabulary [Current Events] : adequate knowledge of current events [Balance] : balance was intact [Abnormal Walk] : normal gait [Sensation Tactile Decrease] : light touch was intact

## 2020-08-17 NOTE — REASON FOR VISIT
[Other: _____] : [unfilled] [de-identified] : 7/14/2020 [de-identified] : L4 L5 decompression and fusion

## 2020-08-28 ENCOUNTER — APPOINTMENT (OUTPATIENT)
Dept: NEUROLOGY | Facility: CLINIC | Age: 55
End: 2020-08-28
Payer: MEDICAID

## 2020-08-28 VITALS
BODY MASS INDEX: 27.59 KG/M2 | WEIGHT: 215 LBS | HEIGHT: 74 IN | SYSTOLIC BLOOD PRESSURE: 121 MMHG | HEART RATE: 85 BPM | DIASTOLIC BLOOD PRESSURE: 74 MMHG

## 2020-08-28 VITALS — TEMPERATURE: 96.6 F

## 2020-08-28 PROCEDURE — 99214 OFFICE O/P EST MOD 30 MIN: CPT

## 2020-08-28 RX ORDER — CYCLOBENZAPRINE HYDROCHLORIDE 10 MG/1
10 TABLET, FILM COATED ORAL TWICE DAILY
Qty: 60 | Refills: 0 | Status: DISCONTINUED | COMMUNITY
Start: 2020-07-24 | End: 2020-08-28

## 2020-08-28 NOTE — CONSULT LETTER
[Consult Letter:] : I had the pleasure of evaluating your patient, [unfilled]. [Dear  ___] : Dear  [unfilled], [( Thank you for referring [unfilled] for consultation for _____ )] : Thank you for referring [unfilled] for consultation for [unfilled] [Consult Closing:] : Thank you very much for allowing me to participate in the care of this patient.  If you have any questions, please do not hesitate to contact me. [Please see my note below.] : Please see my note below. [Sincerely,] : Sincerely, [Santo Boland MD] : Santo Boland MD [Attending, Dept. of Neurology, Division of Epilepsy] : Attending, Dept. of Neurology, Division of Epilepsy [[facility]] : [facility] [ of Neurology] :  of Neurology [FreeTextEntry2] : Dr. Peter Farrell\par [address] [place] [address]

## 2020-08-28 NOTE — DISCUSSION/SUMMARY
[Medically Refractory (seizure within the last year)] : Medically Refractory (seizure within the last year) [Safety Recommendations] : The patient was advised in regards to the risk of seizures and general seizure safety recommendations including not to be bathing alone, climbing to high places and operating heavy machinery. [Risks Associated with Driving/[place] Law] : As per my usual protocol, the patient was advised in regards to risks and driving privileges associated with the [place] Guidelines.  [Sleep Hygiene/Sleep Disruption Risks] : Sleep hygiene and the risks of sleep disruption were discussed. [Medication Side Effects] : High frequency and serious potential medication adverse effects were reviewed with the patient, including but not exclusive to psychiatric effects.  Information sheets on medication side effects were made available to the patient in our clinic.  The patient or advocate agrees to notify us for any concerns. [Compliance with Medications] : The importance of compliance with medications was reinforced. [Mental Health Evaluation] : Mental health evaluation” was recommended with either our  and psychologist, at [facility] ([facility] ): [phone], or [facility] Intake: [phone] [Obtain Copies of Medical Records] : Patient was asked to obtain copies of pertinent prior medical records or studies [FreeTextEntry1] : Seizures since 2007 baseline was q1-5months, Likely underreported. 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  History of poor response to OXC, poor tolerability of VPA, PGB.  \par Prior NL MRI.\par Stress, anxiety, mood issues, poor med tolerability, poor insight a factor barriers to his own care.\par Chronic pain issues.  Sleep deprivation, insomnia an issue. Poor appetite.\par Memory complaints.\par h/o TBIs/concussions.\par \par On LTG level 6.2 in 2/2019, s/p inc to 225mg bid.\par Future options of higher dose LTG, vimpat, ZNS, TPM.  \par Pt reluctant to pursue surgical options \par F/u LTG level\par \par -Needs a psychiatrist, psychologist. saw LICSW.  Consider AD/SSRI (pt reluctant).  Stress reduction,  exercise.\par -Referred to sleep specialist in past.  Discussed sleep aide (tried ambien in the past)\par -Physical therapy\par \par -MRI c-spine showing stenosis, f/u with Nsurg Dr Cortes.  Back issues, lower spine as well L4-5.\par -s/p EMG for sensory disturbance of hands neuropathy: CTS: wear wrist guards at night\par \par voluntarily gave up driving

## 2020-08-28 NOTE — DATA REVIEWED
[de-identified] : 2007 MRI neg\par 1/2017: MRI neg (OSH)\par 2/2018: MRI C spine: C3-5 C7 canal stenosis, compression [de-identified] : 2007, 2009, 2011 AEEG 48hr neg\par The patient had ambulatory EEG monitoring in April 2016.  This has shown a left temporal seizure on the random one day recording.   [de-identified] : EMG 3/2018: CTS bilat R>L, ulnar neuropathy on the L\par
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
## 2020-08-28 NOTE — CONSULT LETTER
[Dear  ___] : Dear  [unfilled], [Consult Letter:] : I had the pleasure of evaluating your patient, [unfilled]. [( Thank you for referring [unfilled] for consultation for _____ )] : Thank you for referring [unfilled] for consultation for [unfilled] [Please see my note below.] : Please see my note below. [Consult Closing:] : Thank you very much for allowing me to participate in the care of this patient.  If you have any questions, please do not hesitate to contact me. [Sincerely,] : Sincerely, [Santo Boland MD] : Santo Boland MD [Attending, Dept. of Neurology, Division of Epilepsy] : Attending, Dept. of Neurology, Division of Epilepsy [[facility]] : [facility] [ of Neurology] :  of Neurology [FreeTextEntry2] : Dr. Homero Leonard\par [address] [place] [address]
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
## 2020-08-28 NOTE — DATA REVIEWED
[de-identified] : 2007 MRI neg\par 1/2017: MRI neg (OSH)\par 2/2018: MRI C spine: C3-5 C7 canal stenosis, compression [de-identified] : 2007, 2009, 2011 AEEG 48hr neg\par The patient had ambulatory EEG monitoring in April 2016.  This has shown a left temporal seizure on the random one day recording.   [de-identified] : EMG 3/2018: CTS bilat R>L, ulnar neuropathy on the L\par

## 2020-08-28 NOTE — PHYSICAL EXAM
[General Appearance - Alert] : alert [Oriented To Time, Place, And Person] : oriented to person, place, and time [General Appearance - In No Acute Distress] : in no acute distress [Impaired Insight] : insight and judgment were intact [Affect] : the affect was normal [Person] : oriented to person [Place] : oriented to place [Time] : oriented to time [Visual Intact] : visual attention was ~T not ~L decreased [Concentration Intact] : normal concentrating ability [Repeating Phrases] : no difficulty repeating a phrase [Naming Objects] : no difficulty naming common objects [Writing A Sentence] : no difficulty writing a sentence [Fluency] : fluency intact [Reading] : reading intact [Comprehension] : comprehension intact [Past History] : adequate knowledge of personal past history [Cranial Nerves Optic (II)] : visual acuity intact bilaterally,  visual fields full to confrontation, pupils equal round and reactive to light [Cranial Nerves Oculomotor (III)] : extraocular motion intact [Cranial Nerves Trigeminal (V)] : facial sensation intact symmetrically [Cranial Nerves Facial (VII)] : face symmetrical [Cranial Nerves Vestibulocochlear (VIII)] : hearing was intact bilaterally [Cranial Nerves Glossopharyngeal (IX)] : tongue and palate midline [Cranial Nerves Accessory (XI - Cranial And Spinal)] : head turning and shoulder shrug symmetric [Cranial Nerves Hypoglossal (XII)] : there was no tongue deviation with protrusion [Motor Tone] : muscle tone was normal in all four extremities [Motor Strength] : muscle strength was normal in all four extremities [No Muscle Atrophy] : normal bulk in all four extremities [Sensation Tactile Decrease] : light touch was intact [Motor Handedness Right-Handed] : the patient is right hand dominant [Abnormal Walk] : normal gait [Balance] : balance was intact [3+] : Patella right 3+ [0] : Ankle jerk left 0 [Sclera] : the sclera and conjunctiva were normal [Outer Ear] : the ears and nose were normal in appearance [Neck Appearance] : the appearance of the neck was normal [Apical Impulse] : the apical impulse was normal [Abdomen Tenderness] : non-tender [Heart Rate And Rhythm] : heart rate was normal and rhythm regular [Nail Clubbing] : no clubbing  or cyanosis of the fingernails [] : no rash [Skin Lesions] : no skin lesions [Dysesthesia] : dysesthesia was present [Tremor] : no tremor present [Past-pointing] : there was no past-pointing [2+] : Triceps left 2+ [Plantar Reflex Right Only] : normal on the right [1+] : Ankle jerk right 1+ [Plantar Reflex Left Only] : normal on the left [FreeTextEntry4] : easily excitable [FreeTextEntry7] : in fingertips bilat

## 2020-08-28 NOTE — DISCUSSION/SUMMARY
[Medically Refractory (seizure within the last year)] : Medically Refractory (seizure within the last year) [Risks Associated with Driving/[place] Law] : As per my usual protocol, the patient was advised in regards to risks and driving privileges associated with the [place] Guidelines.  [Safety Recommendations] : The patient was advised in regards to the risk of seizures and general seizure safety recommendations including not to be bathing alone, climbing to high places and operating heavy machinery. [Compliance with Medications] : The importance of compliance with medications was reinforced. [Medication Side Effects] : High frequency and serious potential medication adverse effects were reviewed with the patient, including but not exclusive to psychiatric effects.  Information sheets on medication side effects were made available to the patient in our clinic.  The patient or advocate agrees to notify us for any concerns. [Sleep Hygiene/Sleep Disruption Risks] : Sleep hygiene and the risks of sleep disruption were discussed. [Obtain Copies of Medical Records] : Patient was asked to obtain copies of pertinent prior medical records or studies [Mental Health Evaluation] : Mental health evaluation” was recommended with either our  and psychologist, at [facility] ([facility] ): [phone], or [facility] Intake: [phone] [FreeTextEntry1] : Seizures since 2007 baseline was q1-5months, Likely underreported. 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  History of poor response to OXC, poor tolerability of VPA, PGB.  \par Prior NL MRI.\par Verbal memory complaints, likely secondary to chronic left temporal epilepsy.\par Stress, anxiety, mood issues, poor med tolerability, poor insight a factor barriers to his own care.\par Chronic pain issues.  Sleep deprivation, insomnia an issue. Poor appetite.\par h/o TBIs/concussions.\par \par On LTG level 6.2 in 2/2019, s/p inc to 225mg bid.\par If needed, future options of higher dose LTG, vimpat, ZNS, TPM.  \par F/u LTG level, basic labs\par \par -Needs a psychiatrist, psychologist. saw LICSW.  Consider AD/SSRI (pt reluctant).  Stress reduction,  exercise.\par -Referred to sleep specialist in past.  Discussed sleep aide (tried ambien in the past)\par -Physical therapy\par \par -MRI c-spine showing stenosis, f/u with Nsurg Dr Cortes.  Back issues, lower spine as well L4-5 p fusion.\par \par -voluntarily gave up driving\par

## 2020-08-28 NOTE — REVIEW OF SYSTEMS
[Feeling Tired] : feeling tired [Anxiety] : anxiety [Depression] : depression [Confused or Disoriented] : confusion [Memory Lapses or Loss] : memory loss [Decr. Concentrating Ability] : decreased concentrating ability [Difficulty with Language] : ~M difficulty with language [Changed Thought Patterns] : changed thought patterns [Numbness] : numbness [Tingling] : tingling [Seizures] : convulsions [Abnormal Sensation] : an abnormal sensation [Palpitations] : palpitations [Abdominal Pain] : abdominal pain [As Noted in HPI] : as noted in HPI [Negative] : Heme/Lymph

## 2020-08-28 NOTE — PHYSICAL EXAM
[General Appearance - Alert] : alert [General Appearance - In No Acute Distress] : in no acute distress [Oriented To Time, Place, And Person] : oriented to person, place, and time [Impaired Insight] : insight and judgment were intact [Affect] : the affect was normal [Person] : oriented to person [Place] : oriented to place [Time] : oriented to time [Concentration Intact] : normal concentrating ability [Visual Intact] : visual attention was ~T not ~L decreased [Naming Objects] : no difficulty naming common objects [Repeating Phrases] : no difficulty repeating a phrase [Writing A Sentence] : no difficulty writing a sentence [Fluency] : fluency intact [Comprehension] : comprehension intact [Reading] : reading intact [Past History] : adequate knowledge of personal past history [Cranial Nerves Optic (II)] : visual acuity intact bilaterally,  visual fields full to confrontation, pupils equal round and reactive to light [Cranial Nerves Oculomotor (III)] : extraocular motion intact [Cranial Nerves Trigeminal (V)] : facial sensation intact symmetrically [Cranial Nerves Facial (VII)] : face symmetrical [Cranial Nerves Vestibulocochlear (VIII)] : hearing was intact bilaterally [Cranial Nerves Glossopharyngeal (IX)] : tongue and palate midline [Cranial Nerves Accessory (XI - Cranial And Spinal)] : head turning and shoulder shrug symmetric [Cranial Nerves Hypoglossal (XII)] : there was no tongue deviation with protrusion [Motor Tone] : muscle tone was normal in all four extremities [Motor Strength] : muscle strength was normal in all four extremities [No Muscle Atrophy] : normal bulk in all four extremities [Motor Handedness Right-Handed] : the patient is right hand dominant [Sensation Tactile Decrease] : light touch was intact [Abnormal Walk] : normal gait [Balance] : balance was intact [3+] : Patella right 3+ [2+] : Ankle jerk right 2+ [Sclera] : the sclera and conjunctiva were normal [Outer Ear] : the ears and nose were normal in appearance [Neck Appearance] : the appearance of the neck was normal [Heart Rate And Rhythm] : heart rate was normal and rhythm regular [Apical Impulse] : the apical impulse was normal [Nail Clubbing] : no clubbing  or cyanosis of the fingernails [Abdomen Tenderness] : non-tender [] : no rash [Skin Lesions] : no skin lesions [0] : Ankle jerk left 0 [Past-pointing] : there was no past-pointing [Tremor] : no tremor present [Plantar Reflex Right Only] : normal on the right [Plantar Reflex Left Only] : normal on the left [FreeTextEntry4] : easily excitable

## 2020-09-14 ENCOUNTER — APPOINTMENT (OUTPATIENT)
Dept: SPINE | Facility: CLINIC | Age: 55
End: 2020-09-14
Payer: MEDICAID

## 2020-09-14 VITALS
DIASTOLIC BLOOD PRESSURE: 89 MMHG | SYSTOLIC BLOOD PRESSURE: 158 MMHG | RESPIRATION RATE: 18 BRPM | TEMPERATURE: 97.1 F | HEART RATE: 97 BPM | OXYGEN SATURATION: 98 %

## 2020-09-14 PROCEDURE — 99024 POSTOP FOLLOW-UP VISIT: CPT

## 2020-09-14 NOTE — REVIEW OF SYSTEMS
[Numbness] : numbness [Tingling] : tingling [Negative] : Heme/Lymph [de-identified] : neck into elbow pain

## 2020-09-14 NOTE — PHYSICAL EXAM
[General Appearance - Alert] : alert [General Appearance - Well Nourished] : well nourished [General Appearance - In No Acute Distress] : in no acute distress [General Appearance - Well Developed] : well developed [General Appearance - Well-Appearing] : healthy appearing [Clean] : clean [Dry] : dry [Healing Well] : healing well [No Drainage] : without drainage [Erythema] : not erythematous [Normal Skin] : normal [Warm] : not warm [Tender] : not tender [Oriented To Time, Place, And Person] : oriented to person, place, and time [Affect] : the affect was normal [Impaired Insight] : insight and judgment were intact [Mood] : the mood was normal [Memory Recent] : recent memory was not impaired [Person] : oriented to person [Place] : oriented to place [Short Term Intact] : short term memory intact [Time] : oriented to time [Remote Intact] : remote memory intact [Registration Intact] : recent registration memory intact [Span Intact] : the attention span was normal [Concentration Intact] : normal concentrating ability [Visual Intact] : visual attention was ~T not ~L decreased [Fluency] : fluency intact [Comprehension] : comprehension intact [Reading] : reading intact [Past History] : adequate knowledge of personal past history [Current Events] : adequate knowledge of current events [Vocabulary] : adequate range of vocabulary [Motor Strength] : muscle strength was normal in all four extremities [Involuntary Movements] : no involuntary movements were seen [Sensation Tactile Decrease] : light touch was intact [Balance] : balance was intact [Sclera] : the sclera and conjunctiva were normal [Outer Ear] : the ears and nose were normal in appearance [Neck Appearance] : the appearance of the neck was normal [] : no respiratory distress [Abnormal Walk] : normal gait [Skin Color & Pigmentation] : normal skin color and pigmentation

## 2020-09-14 NOTE — REASON FOR VISIT
[Other: _____] : [unfilled] [de-identified] : 7/14/2020 [de-identified] : L4 L5 decompression and fusion

## 2020-09-14 NOTE — HISTORY OF PRESENT ILLNESS
[FreeTextEntry1] : Mr. Ramos is a patient returning at 8 weeks post L4 L5 lumbar fusion and he has no back or leg pain.  He is walking three to four miles with no distress.  His incision is healing well and skin intact.  He is under the care of pain management and attends PT which is helpful.   He has neck pain that radiates into his shoulder and chest area down to the elbows.  Bilateral hand pain and left middle finger tremors are also reported.    An MRI of the cervical spine shows mild stenosis above and below the level of prior fusion from 2012 and an osteophyte

## 2020-09-14 NOTE — ASSESSMENT
[FreeTextEntry1] : 54 year old male - 2 months post L4 L5 interbody fusion.  Doing well.  Onset of mild stenosis above  and below  prior cervical fusion.  He was encouraged to continue PT and no bending or lifting.  In December he will return with lumbar AP and lateral  xrays.  Cervical MRI shows mild stenosis and he will report  any new or progressive symptoms and be re-evaluated.

## 2020-09-25 NOTE — ED ADULT TRIAGE NOTE - RESPIRATORY RATE (BREATHS/MIN)
US showed DVT on the left leg. None on the right.   Cont treatment dose lovenox. Pt states her  will administer lovenox upon DC to home. 18

## 2020-09-28 ENCOUNTER — APPOINTMENT (OUTPATIENT)
Dept: SPINE | Facility: CLINIC | Age: 55
End: 2020-09-28

## 2020-12-14 ENCOUNTER — APPOINTMENT (OUTPATIENT)
Dept: SPINE | Facility: CLINIC | Age: 55
End: 2020-12-14
Payer: MEDICAID

## 2020-12-14 VITALS
WEIGHT: 215 LBS | SYSTOLIC BLOOD PRESSURE: 136 MMHG | DIASTOLIC BLOOD PRESSURE: 79 MMHG | RESPIRATION RATE: 16 BRPM | BODY MASS INDEX: 27.59 KG/M2 | OXYGEN SATURATION: 96 % | HEART RATE: 95 BPM | TEMPERATURE: 97.8 F | HEIGHT: 74 IN

## 2020-12-14 DIAGNOSIS — R20.0 ANESTHESIA OF SKIN: ICD-10-CM

## 2020-12-14 DIAGNOSIS — Z82.61 FAMILY HISTORY OF ARTHRITIS: ICD-10-CM

## 2020-12-14 PROCEDURE — 99072 ADDL SUPL MATRL&STAF TM PHE: CPT

## 2020-12-14 PROCEDURE — 99212 OFFICE O/P EST SF 10 MIN: CPT

## 2020-12-14 NOTE — PHYSICAL EXAM
[Motor Strength] : muscle strength was normal in all four extremities [Sensation Tactile Decrease] : light touch was intact [Abnormal Walk] : normal gait [Stevens] : Stevens's sign was not demonstrated

## 2020-12-14 NOTE — REASON FOR VISIT
[Follow-Up: _____] : a [unfilled] follow-up visit [FreeTextEntry1] : 5 months postop L4 L5 fusion.  He reports minimal back pain and lumbar xrays shows alignment and hardware intact.  He reports left sided neck pain and left fifth finger numbness.  Cervical MRI shows a decreased disc herniation with mild stenosis.

## 2020-12-14 NOTE — ASSESSMENT
[FreeTextEntry1] : 55 year old male who is 5 months post Lumbar fusion L 4L5.  Significantly improved and now with HA and left sided neck pain.  MRI of cervical spine shows decreased disc herniation.  He will continue PT and return in three months with lumbar xrays.   He will discuss his SANCHEZ history with Dr Boland.  A rheumatology referral will be provided for general joint pain.

## 2020-12-14 NOTE — REVIEW OF SYSTEMS
[Hand Weakness] :  hand weakness [Numbness] : numbness [Tingling] : tingling [Negative] : Heme/Lymph [de-identified] : back and left sided neck pain

## 2021-01-26 ENCOUNTER — APPOINTMENT (OUTPATIENT)
Dept: RHEUMATOLOGY | Facility: CLINIC | Age: 56
End: 2021-01-26
Payer: MEDICAID

## 2021-01-26 VITALS
BODY MASS INDEX: 40.43 KG/M2 | HEIGHT: 74 IN | SYSTOLIC BLOOD PRESSURE: 119 MMHG | TEMPERATURE: 96.8 F | DIASTOLIC BLOOD PRESSURE: 66 MMHG | HEART RATE: 70 BPM | OXYGEN SATURATION: 95 % | WEIGHT: 315 LBS

## 2021-01-26 DIAGNOSIS — M19.049 PRIMARY OSTEOARTHRITIS, UNSPECIFIED HAND: ICD-10-CM

## 2021-01-26 PROCEDURE — 99204 OFFICE O/P NEW MOD 45 MIN: CPT

## 2021-01-26 PROCEDURE — 99072 ADDL SUPL MATRL&STAF TM PHE: CPT

## 2021-01-26 NOTE — ASSESSMENT
[FreeTextEntry1] : 55M former  with cervical and lumbar disc herniations and DDD with hand, foot, hip pain - all consistent with generalized osteoarthritis likely in the setting of occupational overuse injury. Low clinical suspicion for rheumatoid arthritis\par \par Plan:\par -Discussed diagnosis of osteoarthritis and conservative joint preservation and pain management strategies, including Tylenol/NSAIDS, diclofenac gel and supplements. Discussed limited evidence but potential benefit for tumeric and glucosamine supplementation. Discussed long-term value of physical therapy.\par -hand and foot xrays\par -PT for hand PT and cervical DDD\par -RTC prn

## 2021-01-26 NOTE — HISTORY OF PRESENT ILLNESS
[FreeTextEntry1] : Mr. Ramos is a 55M referred by neurosurgery for joint pain.\par \par PHI:\par # Cervical and lumbar radiculopathy\par Mr. Ramos has cervical and lumbar radiculopathy and disc herniations for many years, underwnt L4-L5 fusion in 2019. His low back pain is much improved, but still has neck pain and paresthesias in left arm. He has been on pain medications for many years, currently off of all, used to take muscle relaxants, has had LIANET without relief.\par \par # Hand pain\par Several years of hand pain, mostly with fine motor tasks. No morning stiffness of swelling. He was a  for many years. He has noted that his knuckles have become more prominent, like his mother's did.\par Also has CTS s/p R surgical release many years ago, which lead to some relief, but still has numbness and tingling in his hands\par \par # Foot injury\par Had injury at work - steel spike went through his foot. Has chronic left foot pain. Has not had surgery for it\par \par FH: mother OA. no autoimmune disease\par Occupation: former  for over 10 years. No longer working

## 2021-01-26 NOTE — CONSULT LETTER
[Dear  ___] : Dear  [unfilled], [Consult Letter:] : I had the pleasure of evaluating your patient, [unfilled]. [Please see my note below.] : Please see my note below. [Consult Closing:] : Thank you very much for allowing me to participate in the care of this patient.  If you have any questions, please do not hesitate to contact me. [Sincerely,] : Sincerely, [FreeTextEntry2] : Dr. Homero Cortes [FreeTextEntry3] : Dr. Mary Shaw\par

## 2021-01-26 NOTE — PHYSICAL EXAM
[General Appearance - Alert] : alert [General Appearance - In No Acute Distress] : in no acute distress [General Appearance - Well Nourished] : well nourished [Musculoskeletal - Swelling] : no joint swelling seen [] : no rash [Sensation] : the sensory exam was normal to light touch and pinprick [Motor Exam] : the motor exam was normal [Oriented To Time, Place, And Person] : oriented to person, place, and time [FreeTextEntry1] : Pronounced PIP likely Kelly nodes. + Tinel. No foot deformities, synovitis, TTP

## 2021-02-15 ENCOUNTER — EMERGENCY (EMERGENCY)
Facility: HOSPITAL | Age: 56
LOS: 1 days | Discharge: HOME | End: 2021-02-15
Attending: EMERGENCY MEDICINE
Payer: MEDICAID

## 2021-02-15 VITALS — RESPIRATION RATE: 12 BRPM | HEART RATE: 89 BPM | OXYGEN SATURATION: 98 %

## 2021-02-15 VITALS
WEIGHT: 210.1 LBS | TEMPERATURE: 100 F | OXYGEN SATURATION: 97 % | RESPIRATION RATE: 19 BRPM | DIASTOLIC BLOOD PRESSURE: 86 MMHG | HEART RATE: 118 BPM | HEIGHT: 74 IN | SYSTOLIC BLOOD PRESSURE: 150 MMHG

## 2021-02-15 DIAGNOSIS — Z98.89 OTHER SPECIFIED POSTPROCEDURAL STATES: Chronic | ICD-10-CM

## 2021-02-15 PROCEDURE — 96374 THER/PROPH/DIAG INJ IV PUSH: CPT

## 2021-02-15 PROCEDURE — 99284 EMERGENCY DEPT VISIT MOD MDM: CPT | Mod: 25

## 2021-02-15 PROCEDURE — 96376 TX/PRO/DX INJ SAME DRUG ADON: CPT

## 2021-02-15 PROCEDURE — 99284 EMERGENCY DEPT VISIT MOD MDM: CPT

## 2021-02-15 PROCEDURE — 96375 TX/PRO/DX INJ NEW DRUG ADDON: CPT

## 2021-02-15 RX ORDER — KETOROLAC TROMETHAMINE 30 MG/ML
15 SYRINGE (ML) INJECTION ONCE
Refills: 0 | Status: DISCONTINUED | OUTPATIENT
Start: 2021-02-15 | End: 2021-02-15

## 2021-02-15 RX ORDER — IBUPROFEN 200 MG
1 TABLET ORAL
Qty: 42 | Refills: 0
Start: 2021-02-15 | End: 2021-02-21

## 2021-02-15 RX ORDER — METOCLOPRAMIDE HCL 10 MG
10 TABLET ORAL ONCE
Refills: 0 | Status: COMPLETED | OUTPATIENT
Start: 2021-02-15 | End: 2021-02-15

## 2021-02-15 RX ORDER — LIDOCAINE 4 G/100G
1 CREAM TOPICAL ONCE
Refills: 0 | Status: COMPLETED | OUTPATIENT
Start: 2021-02-15 | End: 2021-02-15

## 2021-02-15 RX ORDER — DIPHENHYDRAMINE HCL 50 MG
2 CAPSULE ORAL
Qty: 56 | Refills: 0
Start: 2021-02-15 | End: 2021-02-21

## 2021-02-15 RX ORDER — SODIUM CHLORIDE 9 MG/ML
1000 INJECTION, SOLUTION INTRAVENOUS
Refills: 0 | Status: DISCONTINUED | OUTPATIENT
Start: 2021-02-15 | End: 2021-02-18

## 2021-02-15 RX ORDER — FAMOTIDINE 10 MG/ML
20 INJECTION INTRAVENOUS ONCE
Refills: 0 | Status: COMPLETED | OUTPATIENT
Start: 2021-02-15 | End: 2021-02-15

## 2021-02-15 RX ORDER — ACETAMINOPHEN 500 MG
2 TABLET ORAL
Qty: 56 | Refills: 0
Start: 2021-02-15 | End: 2021-02-21

## 2021-02-15 RX ORDER — SODIUM CHLORIDE 9 MG/ML
1000 INJECTION, SOLUTION INTRAVENOUS ONCE
Refills: 0 | Status: COMPLETED | OUTPATIENT
Start: 2021-02-15 | End: 2021-02-15

## 2021-02-15 RX ORDER — ACETAMINOPHEN 500 MG
1000 TABLET ORAL ONCE
Refills: 0 | Status: COMPLETED | OUTPATIENT
Start: 2021-02-15 | End: 2021-02-15

## 2021-02-15 RX ADMIN — Medication 10 MILLIGRAM(S): at 05:13

## 2021-02-15 RX ADMIN — Medication 400 MILLIGRAM(S): at 05:06

## 2021-02-15 RX ADMIN — Medication 15 MILLIGRAM(S): at 06:31

## 2021-02-15 RX ADMIN — LIDOCAINE 1 PATCH: 4 CREAM TOPICAL at 05:07

## 2021-02-15 RX ADMIN — SODIUM CHLORIDE 4000 MILLILITER(S): 9 INJECTION, SOLUTION INTRAVENOUS at 05:48

## 2021-02-15 RX ADMIN — Medication 15 MILLIGRAM(S): at 05:13

## 2021-02-15 RX ADMIN — SODIUM CHLORIDE 150 MILLILITER(S): 9 INJECTION, SOLUTION INTRAVENOUS at 06:33

## 2021-02-15 RX ADMIN — FAMOTIDINE 20 MILLIGRAM(S): 10 INJECTION INTRAVENOUS at 06:31

## 2021-02-15 NOTE — ED PROVIDER NOTE - OBJECTIVE STATEMENT
54 yo male with PMHx seizure disorder, HTN, DM, cervical disease and s/p C5-C7 ACDF, L4-5 Anterior Lateral lumbar discectomy/fusion, presenting with neck pain, back pain, HA, N/V. Per patient, for past one week has had intermittent back and neck pain. states he has left sided neck pain radiating down left arm. Also having intermittent lower back pain. States he has only tried tylenol for pain. Began to have unilateral HA, then multiple episodes of vomiting, then back pain subsequently. Came to ED for further evaluation. no seizures recently, controlled on lamotrigine. Follows neurology and spine.    Denies fevers, weakness, cough, SOB, CP, neck stiffness.

## 2021-02-15 NOTE — ED ADULT TRIAGE NOTE - CHIEF COMPLAINT QUOTE
intermittent HA and neck pain x1 week, also reports intermittent vomiting x1week. hx neck and back surgery

## 2021-02-15 NOTE — ED ADULT NURSE NOTE - OBJECTIVE STATEMENT
55 year old male presents to ED via walk-in complaining of neck pain. Pt has hx of seizires, HTN and diabetes. States he had one neck surgery and was supposed to get a second in 2018 and never got it. Now feeling increased neck pain worse with movement. Upon assessment A&O x4, ambulatory and in pain. Gross neuro intact. Strong x4 extremities. PERRL. No new changes in vision or sensation. No new numbness/tingling. IV placed. Medicated for pain. Bed locked and lowered. Comfort and safety measures maintained.

## 2021-02-15 NOTE — ED PROVIDER NOTE - CARDIAC, MLM
Normal rate, regular rhythm.  Heart sounds S1, S2.  No murmurs, rubs or gallops. radial pulses intact BL

## 2021-02-15 NOTE — ED PROVIDER NOTE - PMH
Benign Hypertension    Cervical disc disease with myelopathy    Cervical disc disorder with radiculopathy    Cervical disc displacement    Depressive disorder, not elsewhere classified    Diabetes    Enlargement (benign) of prostate    Hypercholesteremia    Lumbar radiculopathy    Seizure  Since 2007, last seizure Fall 2018  Seizure disorder  generalized ,   Since 2007, last seizure Fall 2018  Type II or unspecified type diabetes mellitus without mention of complication, not stated as uncontr

## 2021-02-15 NOTE — ED PROVIDER NOTE - PATIENT PORTAL LINK FT
You can access the FollowMyHealth Patient Portal offered by [facility] by registering at the following website: [email or web address]. By joining [facility]’s FollowMyHealth portal, you will also be able to view your health information using other applications (apps) compatible with our system.

## 2021-02-15 NOTE — ED PROVIDER NOTE - NSFOLLOWUPINSTRUCTIONS_ED_ALL_ED_FT
See your Spine Surgeons this week for follow up -- call to discuss.    Continue current medications / treatments as previously directed.    Use Acetaminophen and/or Ibuprofen as directed for pain -- see medication warnings.    See CERVICAL RADICULOPATHY information and return instructions given to you.    Seek immediate medical care for new/worsening symptoms/concerns. Please see your primary care doctor within 24-48 hours for further management of your symptoms.    Please seek emergent medical management if you have any worsening signs or symptoms.    See your Spine Surgeons this week for follow up -- call to discuss.    Continue current medications / treatments as previously directed.    Use Acetaminophen and/or Ibuprofen as directed for pain -- see medication warnings.    For sleep, please consider taking melatonin or benadryl as directed.    See CERVICAL RADICULOPATHY information and return instructions given to you.    Seek immediate medical care for new/worsening symptoms/concerns.

## 2021-02-15 NOTE — ED PROVIDER NOTE - ATTENDING CONTRIBUTION TO CARE
------------ATTENDING NOTE------------   RHD pt c/o one week of increased acute on chronic mild/moderate stabbing pain in L side of neck, radiating down LUE, c/w past cervical radiculopathy. pt also c/o gradual onset moderate dull throbbing pain in L side of head w/ associated nausea and small amts nbnb vomiting, migraine-like in character, similar to multiple past headaches/nausea when body pain exacerbated, otherwise normal neuro exam, no Raymond's, no recent trauma, no fevers, has clear chest, equal distal pulses, has been compliant w/ DM and AED medications, has close Spine f/u -->  - Ji Lindsey MD   ---------------------------------------------- ------------ATTENDING NOTE------------   RHD pt c/o one week of increased acute on chronic mild/moderate stabbing pain in L side of neck, radiating down LUE, c/w past cervical radiculopathy. pt also c/o gradual onset moderate dull throbbing pain in L side of head w/ associated nausea and small amts nbnb vomiting, migraine-like in character, similar to multiple past headaches/nausea when body pain exacerbated, otherwise normal neuro exam, no Raymond's, no recent trauma, no fevers, has clear chest, equal distal pulses, has been compliant w/ DM and AED medications, has close Spine f/u --> pain improved, nml VS (HR 80's), benign repeat exams, no additional complaints, in depth dw pt about ddx, tx, barrera, continued close outpt fu.  - Ji Lindsey MD   ----------------------------------------------

## 2021-02-15 NOTE — ED ADULT NURSE REASSESSMENT NOTE - NS ED NURSE REASSESS COMMENT FT1
received pt aaox4, standing up in room. well appearing. pending DC home after coming to the ED for acute on chronic neck pain. IV removed

## 2021-02-23 ENCOUNTER — APPOINTMENT (OUTPATIENT)
Dept: NEUROLOGY | Facility: CLINIC | Age: 56
End: 2021-02-23

## 2021-03-08 ENCOUNTER — APPOINTMENT (OUTPATIENT)
Dept: SPINE | Facility: CLINIC | Age: 56
End: 2021-03-08
Payer: MEDICAID

## 2021-03-08 VITALS
TEMPERATURE: 97.9 F | WEIGHT: 207 LBS | DIASTOLIC BLOOD PRESSURE: 85 MMHG | HEIGHT: 74 IN | BODY MASS INDEX: 26.56 KG/M2 | HEART RATE: 77 BPM | SYSTOLIC BLOOD PRESSURE: 130 MMHG | OXYGEN SATURATION: 96 %

## 2021-03-08 PROCEDURE — 99072 ADDL SUPL MATRL&STAF TM PHE: CPT

## 2021-03-08 PROCEDURE — 99213 OFFICE O/P EST LOW 20 MIN: CPT

## 2021-03-08 NOTE — REASON FOR VISIT
[Follow-Up: _____] : a [unfilled] follow-up visit [FreeTextEntry1] : Today he reports that he is doing well.\par No significant pain reported. Recent x-rays show no change in hardware or alignment and no movement to index levels on flexion-extension.

## 2021-03-08 NOTE — ASSESSMENT
[FreeTextEntry1] : \par Doing well.  Lumbar Xrays intact hardware and construct.followup x-rays in 4 months.

## 2021-03-08 NOTE — PHYSICAL EXAM
[General Appearance - Alert] : alert [General Appearance - In No Acute Distress] : in no acute distress [Oriented To Time, Place, And Person] : oriented to person, place, and time [Cranial Nerves Optic (II)] : visual acuity intact bilaterally,  pupils equal round and reactive to light [Cranial Nerves Oculomotor (III)] : extraocular motion intact [Cranial Nerves Trigeminal (V)] : facial sensation intact symmetrically [Cranial Nerves Facial (VII)] : face symmetrical [Cranial Nerves Vestibulocochlear (VIII)] : hearing was intact bilaterally [Cranial Nerves Glossopharyngeal (IX)] : tongue and palate midline [Cranial Nerves Accessory (XI - Cranial And Spinal)] : head turning and shoulder shrug symmetric [Cranial Nerves Hypoglossal (XII)] : there was no tongue deviation with protrusion [] : no respiratory distress [Heart Rate And Rhythm] : heart rate was normal and rhythm regular [Skin Color & Pigmentation] : normal skin color and pigmentation [Motor Strength] : muscle strength was normal in all four extremities [Sensation Tactile Decrease] : light touch was intact [Abnormal Walk] : normal gait

## 2021-03-15 ENCOUNTER — APPOINTMENT (OUTPATIENT)
Dept: NEUROLOGY | Facility: CLINIC | Age: 56
End: 2021-03-15
Payer: MEDICAID

## 2021-03-15 VITALS
HEIGHT: 74 IN | DIASTOLIC BLOOD PRESSURE: 74 MMHG | BODY MASS INDEX: 26.31 KG/M2 | WEIGHT: 205 LBS | SYSTOLIC BLOOD PRESSURE: 142 MMHG | HEART RATE: 77 BPM

## 2021-03-15 VITALS — TEMPERATURE: 97.4 F

## 2021-03-15 PROCEDURE — 99213 OFFICE O/P EST LOW 20 MIN: CPT

## 2021-03-15 PROCEDURE — 99072 ADDL SUPL MATRL&STAF TM PHE: CPT

## 2021-03-15 NOTE — PHYSICAL EXAM
[General Appearance - Alert] : alert [General Appearance - In No Acute Distress] : in no acute distress [Oriented To Time, Place, And Person] : oriented to person, place, and time [Impaired Insight] : insight and judgment were intact [Affect] : the affect was normal [Person] : oriented to person [Place] : oriented to place [Time] : oriented to time [Concentration Intact] : normal concentrating ability [Visual Intact] : visual attention was ~T not ~L decreased [Naming Objects] : no difficulty naming common objects [Repeating Phrases] : no difficulty repeating a phrase [Writing A Sentence] : no difficulty writing a sentence [Fluency] : fluency intact [Comprehension] : comprehension intact [Reading] : reading intact [Past History] : adequate knowledge of personal past history [Cranial Nerves Optic (II)] : visual acuity intact bilaterally,  visual fields full to confrontation, pupils equal round and reactive to light [Cranial Nerves Oculomotor (III)] : extraocular motion intact [Cranial Nerves Trigeminal (V)] : facial sensation intact symmetrically [Cranial Nerves Facial (VII)] : face symmetrical [Cranial Nerves Vestibulocochlear (VIII)] : hearing was intact bilaterally [Cranial Nerves Glossopharyngeal (IX)] : tongue and palate midline [Cranial Nerves Accessory (XI - Cranial And Spinal)] : head turning and shoulder shrug symmetric [Cranial Nerves Hypoglossal (XII)] : there was no tongue deviation with protrusion [Motor Tone] : muscle tone was normal in all four extremities [Motor Strength] : muscle strength was normal in all four extremities [No Muscle Atrophy] : normal bulk in all four extremities [Motor Handedness Right-Handed] : the patient is right hand dominant [Sensation Tactile Decrease] : light touch was intact [Dysesthesia] : dysesthesia was present [Abnormal Walk] : normal gait [Balance] : balance was intact [Past-pointing] : there was no past-pointing [Tremor] : no tremor present [2+] : Triceps left 2+ [3+] : Patella right 3+ [1+] : Ankle jerk right 1+ [0] : Ankle jerk left 0 [Plantar Reflex Right Only] : normal on the right [Plantar Reflex Left Only] : normal on the left [FreeTextEntry4] : easily excitable [FreeTextEntry7] : in fingertips bilat [Sclera] : the sclera and conjunctiva were normal [Outer Ear] : the ears and nose were normal in appearance [Neck Appearance] : the appearance of the neck was normal [Apical Impulse] : the apical impulse was normal [Heart Rate And Rhythm] : heart rate was normal and rhythm regular [Abdomen Tenderness] : non-tender [Nail Clubbing] : no clubbing  or cyanosis of the fingernails [] : no rash [Skin Lesions] : no skin lesions

## 2021-03-15 NOTE — DISCUSSION/SUMMARY
[FreeTextEntry1] : Seizures since 2007 baseline was q1-5months, Likely underreported. 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  History of poor response to OXC, poor tolerability of VPA, PGB.  \par Prior NL MRI.\par Stress, anxiety, mood issues, poor med tolerability, poor insight a factor barriers to his own care.\par Chronic pain issues.  Sleep deprivation, insomnia an issue. Poor appetite.\par Memory complaints.\par h/o TBIs/concussions.\par \par On LTG level 6.2 in 2/2019, s/p inc to 225mg bid.\par Future options of higher dose LTG, vimpat, ZNS, TPM.  \par Pt reluctant to pursue surgical options \par F/u LTG level, labs\par \par -would benefit from a psychiatrist, psychologist. saw LICSW.  Consider AD/SSRI (pt reluctant).  Stress reduction,  exercise.\par -Referred to sleep specialist in past.  Discussed sleep aide (tried ambien in the past)\par -Physical therapy\par \par -MRI c-spine showing stenosis, f/u with Nsurg Dr Cortes.  Back issues, lower spine issues L4-5.\par -s/p EMG for sensory disturbance of hands neuropathy: CTS: wear wrist guards at night\par \par voluntarily gave up driving\par \par total time 25min [Medically Refractory (seizure within the last year)] : Medically Refractory (seizure within the last year) [Risks Associated with Driving/[place] Law] : As per my usual protocol, the patient was advised in regards to risks and driving privileges associated with the [place] Guidelines.  [Safety Recommendations] : The patient was advised in regards to the risk of seizures and general seizure safety recommendations including not to be bathing alone, climbing to high places and operating heavy machinery. [Compliance with Medications] : The importance of compliance with medications was reinforced. [Medication Side Effects] : High frequency and serious potential medication adverse effects were reviewed with the patient, including but not exclusive to psychiatric effects.  Information sheets on medication side effects were made available to the patient in our clinic.  The patient or advocate agrees to notify us for any concerns. [Sleep Hygiene/Sleep Disruption Risks] : Sleep hygiene and the risks of sleep disruption were discussed. [Obtain Copies of Medical Records] : Patient was asked to obtain copies of pertinent prior medical records or studies [Mental Health Evaluation] : Mental health evaluation” was recommended with either our  and psychologist, at [facility] ([facility] ): [phone], or [facility] Intake: [phone]

## 2021-03-15 NOTE — DATA REVIEWED
[de-identified] : 2007 MRI neg\par 1/2017: MRI neg (OSH)\par 2/2018: MRI C spine: C3-5 C7 canal stenosis, compression [de-identified] : 2007, 2009, 2011 AEEG 48hr neg\par The patient had ambulatory EEG monitoring in April 2016.  This has shown a left temporal seizure on the random one day recording.   [de-identified] : EMG 3/2018: CTS bilat R>L, ulnar neuropathy on the L\par

## 2021-03-15 NOTE — CONSULT LETTER
[Dear  ___] : Dear  [unfilled], [Consult Letter:] : I had the pleasure of evaluating your patient, [unfilled]. [( Thank you for referring [unfilled] for consultation for _____ )] : Thank you for referring [unfilled] for consultation for [unfilled] [Please see my note below.] : Please see my note below. [Consult Closing:] : Thank you very much for allowing me to participate in the care of this patient.  If you have any questions, please do not hesitate to contact me. [Sincerely,] : Sincerely, [FreeTextEntry2] : Dr. Peter Farrell\par [address] [place] [address] [Santo Boland MD] : Santo Boland MD [Attending, Dept. of Neurology, Division of Epilepsy] : Attending, Dept. of Neurology, Division of Epilepsy [[facility]] : [facility] [ of Neurology] :  of Neurology
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
## 2021-03-29 ENCOUNTER — LABORATORY RESULT (OUTPATIENT)
Age: 56
End: 2021-03-29

## 2021-04-08 ENCOUNTER — APPOINTMENT (OUTPATIENT)
Dept: NEUROLOGY | Facility: CLINIC | Age: 56
End: 2021-04-08
Payer: MEDICAID

## 2021-04-08 VITALS — TEMPERATURE: 94.3 F

## 2021-04-08 PROCEDURE — 99072 ADDL SUPL MATRL&STAF TM PHE: CPT

## 2021-04-08 PROCEDURE — 95910 NRV CNDJ TEST 7-8 STUDIES: CPT

## 2021-04-08 PROCEDURE — 95886 MUSC TEST DONE W/N TEST COMP: CPT

## 2021-04-27 ENCOUNTER — APPOINTMENT (OUTPATIENT)
Dept: GASTROENTEROLOGY | Facility: CLINIC | Age: 56
End: 2021-04-27
Payer: MEDICAID

## 2021-04-27 VITALS
BODY MASS INDEX: 26.31 KG/M2 | DIASTOLIC BLOOD PRESSURE: 70 MMHG | SYSTOLIC BLOOD PRESSURE: 122 MMHG | WEIGHT: 205 LBS | TEMPERATURE: 98 F | HEIGHT: 74 IN

## 2021-04-27 DIAGNOSIS — R19.5 OTHER FECAL ABNORMALITIES: ICD-10-CM

## 2021-04-27 PROCEDURE — 99072 ADDL SUPL MATRL&STAF TM PHE: CPT

## 2021-04-27 PROCEDURE — 99214 OFFICE O/P EST MOD 30 MIN: CPT

## 2021-04-27 NOTE — CONSULT LETTER
[Dear  ___] : Dear  [unfilled], [Courtesy Letter:] : I had the pleasure of seeing your patient, [unfilled], in my office today. [Please see my note below.] : Please see my note below. [Referral Closing:] : Thank you very much for seeing this patient.  If you have any questions, please do not hesitate to contact me. [FreeTextEntry3] : Pipo Ervin MD, FACP, AGAF, FAASLD\par  of Medicine\par [facility]\par

## 2021-04-27 NOTE — HISTORY OF PRESENT ILLNESS
[FreeTextEntry1] : Patient is a 55-year-old male returns in follow-up of positive Cologuard.  I had mailed him a letter in September 2020 with regards to this and he states he did not receive this.  His previous colonoscopy attempt was limited given a poor prep.  He is now off pain medications and hopefully will have a better preparation.  He status post lumbar spine surgery.  He has occasional BPH symptoms but no constipation.  There is no rectal bleeding.  We discussed that a Cologuard checks both for fecal DNA as well as occult blood and a positive test does not necessarily mean that he has colon cancer but this needs to be excluded.  Had no seizures for 2 years.

## 2021-04-27 NOTE — ASSESSMENT
[FreeTextEntry1] : 55-year-old male with multiple joint issues, low back pain, status post lumbar fusion with positive Cologuard.  His previous colonoscopy attempt was limited due to a poor prep and he was advised to follow-up.  There is currently no bowel issues per the patient.  Denies rectal bleeding, constipation or fecal leakage.\par \par He was advised to undergo colonoscopy to which he  agreed. The procedure will be performed in [facility] with the assistance of an anesthesiologist. The procedure was explained in detail and he understood the risks of the procedure not limited  to infection, bleeding, perforation, or non-diagnosis of colon cancer. He  was advised that he could not drive home alone, if the patient chooses to receive sedation. Further diagnostic and treatment recommendations will be based upon the procedure and any biopsies, if they are taken.\par

## 2021-04-27 NOTE — REVIEW OF SYSTEMS
[Joint Pain] : joint pain [Limb Pain] : limb pain [As Noted in HPI] : as noted in HPI [Negative] : Heme/Lymph

## 2021-05-03 ENCOUNTER — LABORATORY RESULT (OUTPATIENT)
Age: 56
End: 2021-05-03

## 2021-05-06 ENCOUNTER — APPOINTMENT (OUTPATIENT)
Dept: GASTROENTEROLOGY | Facility: AMBULATORY SURGERY CENTER | Age: 56
End: 2021-05-06
Payer: MEDICAID

## 2021-05-06 PROCEDURE — 45378 DIAGNOSTIC COLONOSCOPY: CPT

## 2021-05-18 ENCOUNTER — NON-APPOINTMENT (OUTPATIENT)
Age: 56
End: 2021-05-18

## 2021-05-28 ENCOUNTER — APPOINTMENT (OUTPATIENT)
Dept: ORTHOPEDIC SURGERY | Facility: CLINIC | Age: 56
End: 2021-05-28
Payer: MEDICAID

## 2021-05-28 VITALS
SYSTOLIC BLOOD PRESSURE: 139 MMHG | WEIGHT: 212 LBS | HEIGHT: 74 IN | TEMPERATURE: 97.9 F | BODY MASS INDEX: 27.21 KG/M2 | HEART RATE: 74 BPM | DIASTOLIC BLOOD PRESSURE: 78 MMHG

## 2021-05-28 DIAGNOSIS — Z87.39 PERSONAL HISTORY OF OTHER DISEASES OF THE MUSCULOSKELETAL SYSTEM AND CONNECTIVE TISSUE: ICD-10-CM

## 2021-05-28 DIAGNOSIS — G40.909 EPILEPSY, UNSPECIFIED, NOT INTRACTABLE, W/OUT STATUS EPILEPTICUS: ICD-10-CM

## 2021-05-28 DIAGNOSIS — Z86.69 PERSONAL HISTORY OF OTHER DISEASES OF THE NERVOUS SYSTEM AND SENSE ORGANS: ICD-10-CM

## 2021-05-28 PROCEDURE — 99204 OFFICE O/P NEW MOD 45 MIN: CPT

## 2021-05-28 RX ORDER — ROSUVASTATIN CALCIUM 20 MG/1
20 TABLET, FILM COATED ORAL
Refills: 0 | Status: ACTIVE | COMMUNITY

## 2021-05-28 NOTE — ADDENDUM
[FreeTextEntry1] : I, Hemalatha Stern, acted solely as a scribe for Dr. Schmitz on this date 05/28/2021.

## 2021-05-28 NOTE — END OF VISIT
[FreeTextEntry3] : This note was written by Hemalatha Stern on 05/28/2021 acting solely as a scribe for Dr. Curry Schmitz.\par  \par All medical record entries made by the Scribe were at my, Dr. Curry Schmitz, direction and personally dictated by me on 05/28/2021. I have personally reviewed the chart and agree that the record accurately reflects my personal performance of the history, physical exam, assessment and plan.

## 2021-05-28 NOTE — PHYSICAL EXAM
[de-identified] : - Constitutional: This is a male in no obvious distress.  \par - Psych: Patient is alert and oriented to person, place and time.  Patient has a normal mood and affect.\par - Cardiovascular: Normal pulses throughout the upper extremities.  No significant varicosities are noted in the upper extremities. \par - Neuro: Strength and sensation are intact throughout the upper extremities.  Patient has normal coordination.\par - Respiratory:  Patient exhibits no evidence of shortness of breath or difficulty breathing.\par - Skin: No rashes, lesions, or other abnormalities are noted in the upper extremities.\par \par ---\par  \par Examination of his his right hand demonstrates a well-healed carpal tunnel incision.  There is no obvious thenar atrophy.  He has slightly decrease sensation to light touch along the median nerve distribution with normal sensation along the radial and ulnar nerve distributions.  Provocative signs for carpal tunnel syndrome are positive.  There is no evidence of a trigger finger.\par \par Examination of his right elbow demonstrates no obvious swelling or tenderness along the ulnar nerve at the cubital tunnel.  Provocative signs for cubital tunnel syndrome are negative.  There is no instability of the ulnar nerve with flexion extension of the elbow.

## 2021-05-28 NOTE — REVIEW OF SYSTEMS
[Right] : right [Negative] : Allergic/Immunologic [de-identified] : See HPI. [de-identified] : Type 2 diabetes.

## 2021-05-28 NOTE — HISTORY OF PRESENT ILLNESS
[Right] : right hand dominant [FreeTextEntry1] : He comes in today for evaluation of bilateral hand pain and numbness/tingling, which has progressively worsened over the past 6 months. He is status post right carpal tunnel release in 2006 by Dr. Henning. His symptoms had improved after the surgery, though he has noted recurrent symptoms. Currently he describes numbness and tingling in both hands that is worse on the right. He also reports pain in his right thumb. He has associated loss of  and dexterity bilaterally. He has difficulty with performing his daily activities. He rates his pain a 10 out of 10 on the right and a 5 out of 10 on the left. \par \par I reviewed his EMGs from 4/8/2021. This demonstrated moderate bilateral carpal tunnel syndrome and mild ulnar neuropathy at the left elbow. \par \par He has a history of type 2 diabetes.

## 2021-06-02 ENCOUNTER — APPOINTMENT (OUTPATIENT)
Dept: PHYSICAL MEDICINE AND REHAB | Facility: CLINIC | Age: 56
End: 2021-06-02

## 2021-06-04 ENCOUNTER — APPOINTMENT (OUTPATIENT)
Dept: SPINE | Facility: CLINIC | Age: 56
End: 2021-06-04
Payer: MEDICAID

## 2021-06-04 VITALS
SYSTOLIC BLOOD PRESSURE: 146 MMHG | HEART RATE: 77 BPM | BODY MASS INDEX: 27.21 KG/M2 | OXYGEN SATURATION: 96 % | WEIGHT: 212 LBS | TEMPERATURE: 97.6 F | HEIGHT: 74 IN | DIASTOLIC BLOOD PRESSURE: 77 MMHG

## 2021-06-04 PROCEDURE — 99212 OFFICE O/P EST SF 10 MIN: CPT
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
## 2021-07-12 ENCOUNTER — APPOINTMENT (OUTPATIENT)
Dept: SPINE | Facility: CLINIC | Age: 56
End: 2021-07-12

## 2021-08-01 ENCOUNTER — OUTPATIENT (OUTPATIENT)
Dept: OUTPATIENT SERVICES | Facility: HOSPITAL | Age: 56
LOS: 1 days | End: 2021-08-01
Payer: MEDICAID

## 2021-08-01 DIAGNOSIS — Z98.89 OTHER SPECIFIED POSTPROCEDURAL STATES: Chronic | ICD-10-CM

## 2021-08-28 ENCOUNTER — INPATIENT (INPATIENT)
Facility: HOSPITAL | Age: 56
LOS: 3 days | Discharge: HOME | DRG: 440 | End: 2021-09-01
Attending: INTERNAL MEDICINE | Admitting: INTERNAL MEDICINE
Payer: MEDICAID

## 2021-08-28 VITALS
SYSTOLIC BLOOD PRESSURE: 156 MMHG | OXYGEN SATURATION: 98 % | HEIGHT: 74 IN | TEMPERATURE: 99 F | HEART RATE: 102 BPM | RESPIRATION RATE: 20 BRPM | DIASTOLIC BLOOD PRESSURE: 86 MMHG | WEIGHT: 205.03 LBS

## 2021-08-28 DIAGNOSIS — K85.90 ACUTE PANCREATITIS WITHOUT NECROSIS OR INFECTION, UNSPECIFIED: ICD-10-CM

## 2021-08-28 DIAGNOSIS — Z98.89 OTHER SPECIFIED POSTPROCEDURAL STATES: Chronic | ICD-10-CM

## 2021-08-28 LAB
ALBUMIN SERPL ELPH-MCNC: 4.5 G/DL — SIGNIFICANT CHANGE UP (ref 3.3–5)
ALP SERPL-CCNC: 76 U/L — SIGNIFICANT CHANGE UP (ref 40–120)
ALT FLD-CCNC: 14 U/L — SIGNIFICANT CHANGE UP (ref 10–45)
ANION GAP SERPL CALC-SCNC: 16 MMOL/L — SIGNIFICANT CHANGE UP (ref 5–17)
APPEARANCE UR: CLEAR — SIGNIFICANT CHANGE UP
AST SERPL-CCNC: 12 U/L — SIGNIFICANT CHANGE UP (ref 10–40)
BACTERIA # UR AUTO: NEGATIVE — SIGNIFICANT CHANGE UP
BASE EXCESS BLDV CALC-SCNC: 2.2 MMOL/L — HIGH (ref -2–2)
BASOPHILS # BLD AUTO: 0.05 K/UL — SIGNIFICANT CHANGE UP (ref 0–0.2)
BASOPHILS NFR BLD AUTO: 0.5 % — SIGNIFICANT CHANGE UP (ref 0–2)
BILIRUB SERPL-MCNC: 0.2 MG/DL — SIGNIFICANT CHANGE UP (ref 0.2–1.2)
BILIRUB UR-MCNC: NEGATIVE — SIGNIFICANT CHANGE UP
BUN SERPL-MCNC: 25 MG/DL — HIGH (ref 7–23)
CA-I SERPL-SCNC: 1.33 MMOL/L — SIGNIFICANT CHANGE UP (ref 1.15–1.33)
CALCIUM SERPL-MCNC: 10.7 MG/DL — HIGH (ref 8.4–10.5)
CHLORIDE BLDV-SCNC: 103 MMOL/L — SIGNIFICANT CHANGE UP (ref 96–108)
CHLORIDE SERPL-SCNC: 102 MMOL/L — SIGNIFICANT CHANGE UP (ref 96–108)
CO2 BLDV-SCNC: 29 MMOL/L — HIGH (ref 22–26)
CO2 SERPL-SCNC: 20 MMOL/L — LOW (ref 22–31)
COLOR SPEC: SIGNIFICANT CHANGE UP
CREAT SERPL-MCNC: 0.96 MG/DL — SIGNIFICANT CHANGE UP (ref 0.5–1.3)
DIFF PNL FLD: ABNORMAL
EOSINOPHIL # BLD AUTO: 0.12 K/UL — SIGNIFICANT CHANGE UP (ref 0–0.5)
EOSINOPHIL NFR BLD AUTO: 1.1 % — SIGNIFICANT CHANGE UP (ref 0–6)
EPI CELLS # UR: 0 /HPF — SIGNIFICANT CHANGE UP
GAS PNL BLDV: 137 MMOL/L — SIGNIFICANT CHANGE UP (ref 136–145)
GAS PNL BLDV: SIGNIFICANT CHANGE UP
GAS PNL BLDV: SIGNIFICANT CHANGE UP
GLUCOSE BLDV-MCNC: 170 MG/DL — HIGH (ref 70–99)
GLUCOSE SERPL-MCNC: 161 MG/DL — HIGH (ref 70–99)
GLUCOSE UR QL: NEGATIVE — SIGNIFICANT CHANGE UP
HCO3 BLDV-SCNC: 27 MMOL/L — SIGNIFICANT CHANGE UP (ref 22–29)
HCT VFR BLD CALC: 39.6 % — SIGNIFICANT CHANGE UP (ref 39–50)
HCT VFR BLDA CALC: 41 % — SIGNIFICANT CHANGE UP (ref 39–51)
HGB BLD CALC-MCNC: 13.8 G/DL — SIGNIFICANT CHANGE UP (ref 12.6–17.4)
HGB BLD-MCNC: 13.4 G/DL — SIGNIFICANT CHANGE UP (ref 13–17)
HYALINE CASTS # UR AUTO: 0 /LPF — SIGNIFICANT CHANGE UP (ref 0–2)
IMM GRANULOCYTES NFR BLD AUTO: 0.2 % — SIGNIFICANT CHANGE UP (ref 0–1.5)
KETONES UR-MCNC: NEGATIVE — SIGNIFICANT CHANGE UP
LACTATE BLDV-MCNC: 0.9 MMOL/L — SIGNIFICANT CHANGE UP (ref 0.7–2)
LEUKOCYTE ESTERASE UR-ACNC: NEGATIVE — SIGNIFICANT CHANGE UP
LIDOCAIN IGE QN: 460 U/L — HIGH (ref 7–60)
LYMPHOCYTES # BLD AUTO: 3.34 K/UL — HIGH (ref 1–3.3)
LYMPHOCYTES # BLD AUTO: 31.4 % — SIGNIFICANT CHANGE UP (ref 13–44)
MCHC RBC-ENTMCNC: 30.6 PG — SIGNIFICANT CHANGE UP (ref 27–34)
MCHC RBC-ENTMCNC: 33.8 GM/DL — SIGNIFICANT CHANGE UP (ref 32–36)
MCV RBC AUTO: 90.4 FL — SIGNIFICANT CHANGE UP (ref 80–100)
MONOCYTES # BLD AUTO: 0.89 K/UL — SIGNIFICANT CHANGE UP (ref 0–0.9)
MONOCYTES NFR BLD AUTO: 8.4 % — SIGNIFICANT CHANGE UP (ref 2–14)
NEUTROPHILS # BLD AUTO: 6.22 K/UL — SIGNIFICANT CHANGE UP (ref 1.8–7.4)
NEUTROPHILS NFR BLD AUTO: 58.4 % — SIGNIFICANT CHANGE UP (ref 43–77)
NITRITE UR-MCNC: NEGATIVE — SIGNIFICANT CHANGE UP
NRBC # BLD: 0 /100 WBCS — SIGNIFICANT CHANGE UP (ref 0–0)
PCO2 BLDV: 43 MMHG — SIGNIFICANT CHANGE UP (ref 42–55)
PH BLDV: 7.41 — SIGNIFICANT CHANGE UP (ref 7.32–7.43)
PH UR: 6.5 — SIGNIFICANT CHANGE UP (ref 5–8)
PLATELET # BLD AUTO: 203 K/UL — SIGNIFICANT CHANGE UP (ref 150–400)
PO2 BLDV: 44 MMHG — SIGNIFICANT CHANGE UP (ref 25–45)
POTASSIUM BLDV-SCNC: 4.3 MMOL/L — SIGNIFICANT CHANGE UP (ref 3.5–5.1)
POTASSIUM SERPL-MCNC: 4.1 MMOL/L — SIGNIFICANT CHANGE UP (ref 3.5–5.3)
POTASSIUM SERPL-SCNC: 4.1 MMOL/L — SIGNIFICANT CHANGE UP (ref 3.5–5.3)
PROT SERPL-MCNC: 7.6 G/DL — SIGNIFICANT CHANGE UP (ref 6–8.3)
PROT UR-MCNC: ABNORMAL
RBC # BLD: 4.38 M/UL — SIGNIFICANT CHANGE UP (ref 4.2–5.8)
RBC # FLD: 13.3 % — SIGNIFICANT CHANGE UP (ref 10.3–14.5)
RBC CASTS # UR COMP ASSIST: 1 /HPF — SIGNIFICANT CHANGE UP (ref 0–4)
SAO2 % BLDV: 78.7 % — SIGNIFICANT CHANGE UP (ref 67–88)
SARS-COV-2 RNA SPEC QL NAA+PROBE: SIGNIFICANT CHANGE UP
SODIUM SERPL-SCNC: 138 MMOL/L — SIGNIFICANT CHANGE UP (ref 135–145)
SP GR SPEC: >1.05 (ref 1.01–1.02)
UROBILINOGEN FLD QL: NEGATIVE — SIGNIFICANT CHANGE UP
WBC # BLD: 10.64 K/UL — HIGH (ref 3.8–10.5)
WBC # FLD AUTO: 10.64 K/UL — HIGH (ref 3.8–10.5)
WBC UR QL: 0 /HPF — SIGNIFICANT CHANGE UP (ref 0–5)

## 2021-08-28 PROCEDURE — 76705 ECHO EXAM OF ABDOMEN: CPT | Mod: 26,RT

## 2021-08-28 PROCEDURE — 99285 EMERGENCY DEPT VISIT HI MDM: CPT | Mod: CS

## 2021-08-28 PROCEDURE — 74177 CT ABD & PELVIS W/CONTRAST: CPT | Mod: 26,MA

## 2021-08-28 RX ORDER — MORPHINE SULFATE 50 MG/1
2 CAPSULE, EXTENDED RELEASE ORAL ONCE
Refills: 0 | Status: DISCONTINUED | OUTPATIENT
Start: 2021-08-28 | End: 2021-08-28

## 2021-08-28 RX ORDER — ATORVASTATIN CALCIUM 80 MG/1
80 TABLET, FILM COATED ORAL AT BEDTIME
Refills: 0 | Status: DISCONTINUED | OUTPATIENT
Start: 2021-08-28 | End: 2021-09-01

## 2021-08-28 RX ORDER — LAMOTRIGINE 25 MG/1
225 TABLET, ORALLY DISINTEGRATING ORAL
Refills: 0 | Status: DISCONTINUED | OUTPATIENT
Start: 2021-08-28 | End: 2021-09-01

## 2021-08-28 RX ORDER — CLONAZEPAM 1 MG
2 TABLET ORAL
Refills: 0 | Status: DISCONTINUED | OUTPATIENT
Start: 2021-08-28 | End: 2021-09-01

## 2021-08-28 RX ORDER — ONDANSETRON 8 MG/1
4 TABLET, FILM COATED ORAL ONCE
Refills: 0 | Status: COMPLETED | OUTPATIENT
Start: 2021-08-28 | End: 2021-08-28

## 2021-08-28 RX ORDER — AMLODIPINE BESYLATE 2.5 MG/1
5 TABLET ORAL DAILY
Refills: 0 | Status: DISCONTINUED | OUTPATIENT
Start: 2021-08-28 | End: 2021-09-01

## 2021-08-28 RX ORDER — TAMSULOSIN HYDROCHLORIDE 0.4 MG/1
0.8 CAPSULE ORAL AT BEDTIME
Refills: 0 | Status: DISCONTINUED | OUTPATIENT
Start: 2021-08-28 | End: 2021-09-01

## 2021-08-28 RX ORDER — PANTOPRAZOLE SODIUM 20 MG/1
40 TABLET, DELAYED RELEASE ORAL
Refills: 0 | Status: DISCONTINUED | OUTPATIENT
Start: 2021-08-28 | End: 2021-09-01

## 2021-08-28 RX ORDER — MORPHINE SULFATE 50 MG/1
4 CAPSULE, EXTENDED RELEASE ORAL ONCE
Refills: 0 | Status: DISCONTINUED | OUTPATIENT
Start: 2021-08-28 | End: 2021-08-28

## 2021-08-28 RX ORDER — HEPARIN SODIUM 5000 [USP'U]/ML
5000 INJECTION INTRAVENOUS; SUBCUTANEOUS EVERY 12 HOURS
Refills: 0 | Status: DISCONTINUED | OUTPATIENT
Start: 2021-08-28 | End: 2021-09-01

## 2021-08-28 RX ORDER — LAMOTRIGINE 25 MG/1
225 TABLET, ORALLY DISINTEGRATING ORAL ONCE
Refills: 0 | Status: COMPLETED | OUTPATIENT
Start: 2021-08-28 | End: 2021-08-28

## 2021-08-28 RX ORDER — SODIUM CHLORIDE 9 MG/ML
1000 INJECTION, SOLUTION INTRAVENOUS ONCE
Refills: 0 | Status: COMPLETED | OUTPATIENT
Start: 2021-08-28 | End: 2021-08-28

## 2021-08-28 RX ORDER — ATENOLOL 25 MG/1
50 TABLET ORAL DAILY
Refills: 0 | Status: DISCONTINUED | OUTPATIENT
Start: 2021-08-28 | End: 2021-09-01

## 2021-08-28 RX ORDER — SODIUM CHLORIDE 9 MG/ML
1000 INJECTION, SOLUTION INTRAVENOUS
Refills: 0 | Status: DISCONTINUED | OUTPATIENT
Start: 2021-08-28 | End: 2021-09-01

## 2021-08-28 RX ORDER — ACETAMINOPHEN 500 MG
1000 TABLET ORAL ONCE
Refills: 0 | Status: COMPLETED | OUTPATIENT
Start: 2021-08-28 | End: 2021-08-28

## 2021-08-28 RX ADMIN — TAMSULOSIN HYDROCHLORIDE 0.8 MILLIGRAM(S): 0.4 CAPSULE ORAL at 21:23

## 2021-08-28 RX ADMIN — ONDANSETRON 4 MILLIGRAM(S): 8 TABLET, FILM COATED ORAL at 04:15

## 2021-08-28 RX ADMIN — MORPHINE SULFATE 2 MILLIGRAM(S): 50 CAPSULE, EXTENDED RELEASE ORAL at 21:40

## 2021-08-28 RX ADMIN — PANTOPRAZOLE SODIUM 40 MILLIGRAM(S): 20 TABLET, DELAYED RELEASE ORAL at 18:42

## 2021-08-28 RX ADMIN — Medication 400 MILLIGRAM(S): at 18:57

## 2021-08-28 RX ADMIN — Medication 1000 MILLIGRAM(S): at 19:27

## 2021-08-28 RX ADMIN — SODIUM CHLORIDE 1000 MILLILITER(S): 9 INJECTION, SOLUTION INTRAVENOUS at 06:10

## 2021-08-28 RX ADMIN — ONDANSETRON 4 MILLIGRAM(S): 8 TABLET, FILM COATED ORAL at 12:53

## 2021-08-28 RX ADMIN — ATORVASTATIN CALCIUM 80 MILLIGRAM(S): 80 TABLET, FILM COATED ORAL at 21:25

## 2021-08-28 RX ADMIN — MORPHINE SULFATE 4 MILLIGRAM(S): 50 CAPSULE, EXTENDED RELEASE ORAL at 04:15

## 2021-08-28 RX ADMIN — HEPARIN SODIUM 5000 UNIT(S): 5000 INJECTION INTRAVENOUS; SUBCUTANEOUS at 18:42

## 2021-08-28 RX ADMIN — MORPHINE SULFATE 2 MILLIGRAM(S): 50 CAPSULE, EXTENDED RELEASE ORAL at 21:25

## 2021-08-28 RX ADMIN — MORPHINE SULFATE 4 MILLIGRAM(S): 50 CAPSULE, EXTENDED RELEASE ORAL at 12:54

## 2021-08-28 RX ADMIN — LAMOTRIGINE 225 MILLIGRAM(S): 25 TABLET, ORALLY DISINTEGRATING ORAL at 10:40

## 2021-08-28 RX ADMIN — SODIUM CHLORIDE 100 MILLILITER(S): 9 INJECTION, SOLUTION INTRAVENOUS at 14:43

## 2021-08-28 NOTE — ED ADULT NURSE REASSESSMENT NOTE - NS ED NURSE REASSESS COMMENT FT1
pt resting in bed, +ambulatory, clear lungs, abd soft nontender, +bowel sounds, denies pain at the moment, +RTM, bed pending, vitals stable, awaiting lamictal from pharmacy

## 2021-08-28 NOTE — CONSULT NOTE ADULT - SUBJECTIVE AND OBJECTIVE BOX
Chief Complaint:  Patient is a 55y old  Male who presents with a chief complaint of abd pain 55 year old male       with PMH chronic back pain, DM, seizure disorder on lamotrigine       presents with abdominal pain x 2 weeks.       Pt reports left sided back pain radiating to LLQ over the past 2 weeks associated with nausea and multiple episodes of nonbloody nonbilious emesis.        Pt states passing flatus, last BM this morning, "soft" per pt.       Denies any fevers, chest pain, shortness of breath,  bloody stools, black tarry stools, dysuria, testicular pain, numbness, weakness, or rash.       . Denies any recent injury or trauma.  denies  any etoh use    no history of pancreatitis in the past     Review of Systems:  Review of Systems: REVIEW OF SYSTEMS:  GEN: no fever,    no chills  RESP: no SOB,   no cough  CVS: no chest pain,   no palpitations  GI:   abdominal pain,   no nausea,   no vomiting,   no constipation,   no diarrhea  : no dysuria,   no frequency  NEURO: no headache,   no dizziness  PSYCH: no depression,   not anxious  Derm : no rash      Allergies:  No Known Allergies      Medications:  amLODIPine   Tablet 5 milliGRAM(s) Oral daily  ATENolol  Tablet 50 milliGRAM(s) Oral daily  atorvastatin 80 milliGRAM(s) Oral at bedtime  clonazePAM  Tablet 2 milliGRAM(s) Oral two times a day PRN  heparin   Injectable 5000 Unit(s) SubCutaneous every 12 hours  lactated ringers. 1000 milliLiter(s) IV Continuous <Continuous>  lamoTRIgine 225 milliGRAM(s) Oral two times a day  LORazepam     Tablet 1 milliGRAM(s) Oral once  tamsulosin 0.8 milliGRAM(s) Oral at bedtime      PMHX/PSHX:  Diabetes    Type II or unspecified type diabetes mellitus without mention of complication, not stated as uncontr    Depressive disorder, not elsewhere classified    Benign Hypertension    Hypercholesteremia    Status Post Carpal Tunnel Release    Cervical disc disease with myelopathy    Cervical disc disorder with radiculopathy    Cervical disc displacement    Seizure    Seizure disorder    Lumbar radiculopathy    Enlargement (benign) of prostate    Status Post Carpal Tunnel Release    H/O cervical spine surgery        Family history:  Family history of seizures (Aunt)        Social History:   no etoh no cigs no ivda  lives at home    ROS:     General:  No wt loss, fevers, chills, night sweats, fatigue,   Eyes:  Good vision, no reported pain  ENT:  No sore throat, pain, runny nose, dysphagia  CV:  No pain, palpitations, hypo/hypertension  Resp:  No dyspnea, cough, tachypnea, wheezing  GI:  No pain, No nausea, No vomiting, No diarrhea, No constipation, No weight loss, No fever, No pruritis, No rectal bleeding, No tarry stools, No dysphagia,  :  No pain, bleeding, incontinence, nocturia  Muscle:  No pain, weakness  Neuro:  No weakness, tingling, memory problems  Psych:  No fatigue, insomnia, mood problems, depression  Endocrine:  No polyuria, polydipsia, cold/heat intolerance  Heme:  No petechiae, ecchymosis, easy bruisability  Skin:  No rash, tattoos, scars, edema      PHYSICAL EXAM:   Vital Signs:  Vital Signs Last 24 Hrs  T(C): 36.8 (28 Aug 2021 14:48), Max: 37.1 (28 Aug 2021 09:35)  T(F): 98.3 (28 Aug 2021 14:48), Max: 98.7 (28 Aug 2021 09:35)  HR: 76 (28 Aug 2021 14:48) (76 - 102)  BP: 150/89 (28 Aug 2021 14:48) (145/80 - 162/100)  BP(mean): --  RR: 18 (28 Aug 2021 14:48) (18 - 20)  SpO2: 99% (28 Aug 2021 14:48) (97% - 100%)  Daily Height in cm: 187.96 (28 Aug 2021 02:56)    Daily     GENERAL:  Appears stated age, well-groomed, well-nourished, no distress  HEENT:  NC/AT,  conjunctivae clear and pink, no thyromegaly, nodules, adenopathy, no JVD, sclera -anicteric  CHEST:  Full & symmetric excursion, no increased effort, breath sounds clear  HEART:  Regular rhythm, S1, S2, no murmur/rub/S3/S4, no abdominal bruit, no edema  ABDOMEN:  Soft, non-tender, non-distended, normoactive bowel sounds,  no masses ,no hepato-splenomegaly, no signs of chronic liver disease  EXTEREMITIES:  no cyanosis,clubbing or edema  SKIN:  No rash/erythema/ecchymoses/petechiae/wounds/abscess/warm/dry  NEURO:  Alert, oriented, no asterixis, no tremor, no encephalopathy    LABS:                        13.4   10.64 )-----------( 203      ( 28 Aug 2021 04:18 )             39.6     08-28    138  |  102  |  25<H>  ----------------------------<  161<H>  4.1   |  20<L>  |  0.96    Ca    10.7<H>      28 Aug 2021 04:26    TPro  7.6  /  Alb  4.5  /  TBili  0.2  /  DBili  x   /  AST  12  /  ALT  14  /  AlkPhos  76  08-28    LIVER FUNCTIONS - ( 28 Aug 2021 04:26 )  Alb: 4.5 g/dL / Pro: 7.6 g/dL / ALK PHOS: 76 U/L / ALT: 14 U/L / AST: 12 U/L / GGT: x             Urinalysis Basic - ( 28 Aug 2021 07:27 )    Color: Light Yellow / Appearance: Clear / SG: >1.050 / pH: x  Gluc: x / Ketone: Negative  / Bili: Negative / Urobili: Negative   Blood: x / Protein: Trace / Nitrite: Negative   Leuk Esterase: Negative / RBC: 1 /hpf / WBC 0 /HPF   Sq Epi: x / Non Sq Epi: 0 /hpf / Bacteria: Negative      Amylase Serum--      Lipase [id number]       Ammonia--      Imaging:

## 2021-08-28 NOTE — PATIENT PROFILE ADULT - VISION (WITH CORRECTIVE LENSES IF THE PATIENT USUALLY WEARS THEM):
History


Report prepared by Renata:  Maverick Rea


Under the Supervision of:  Dr. Justine Maharaj M.D.


First contact with patient:  23:51


Chief Complaint:  VOMITING


Stated Complaint:  VOMITING,DIARRHEA,RIB PAIN CHILLS,HA





History of Present Illness


The patient is a 53 year old female who presents to the Emergency Room with 

complaints of worsening intermittent vomiting that began March 12. The patient 

states that she was experiencing chills, fever, a cough, and vomiting, which 

caused her to see her physician. She reports she was diagnosed with pneumonia 

and was put on Z Pack, an inhaler, and Zofran. The patient states her symptoms 

have been waxing and waning since and her rib pain has been consistent. She 

reports that her rib pain is worsened with breathing. The patient states that 

today her symptoms worsened. She reports she has been vomiting today and has 

had intermittent episodes of diarrhea. The patient reports she currently has a 

mild headache. The patient denies smoking history, a history of blood clots, 

sick contact, and significant lower extremity swelling.





   Source of History:  patient


   Onset:  March 12


   Position:  other (global)


   Symptom Intensity:  8/10


   Timing:  intermittent, worsening


   Modifying Factors (Relieving):  other (Z Pac, Zofran, inhaler)


   Associated Symptoms:  + fevers, + headache, + cough, + chest pain (right rib)

, + diarrhea





Review of Systems


See HPI for pertinent positives & negatives. A total of 10 systems reviewed and 

were otherwise negative.





Past Medical & Surgical


Medical Problems:


(1) PNA (pneumonia)


Surgical Problems:


(1) H/O: hysterectomy


(2) History of tonsillectomy


(3) Hx of cholecystectomy








Family History





Cancer


Diabetes mellitus


FH: kidney disease


FHx: gallbladder disease


FHx: hypertension


Heart disease





Social History


Smoking Status:  Never Smoker


Smokeless Tobacco Use:  No


Alcohol Use:  occasionally


Drug Use:  none


Housing Status:  lives alone


Occupation Status:  employed





Current/Historical Medications


No Active Prescriptions or Reported Meds





Allergies


Coded Allergies:  


     Codeine (Verified  Allergy, Mild, Depression, 4/12/18)





Physical Exam


Vital Signs











  Date Time  Temp Pulse Resp B/P (MAP) Pulse Ox O2 Delivery O2 Flow Rate FiO2


 


4/13/18 03:30  102 18 135/81 95   


 


4/13/18 02:43  114 16 153/76 95 Room Air  


 


4/13/18 00:43  118      


 


4/13/18 00:36  118 16 131/84 93 Room Air  


 


4/12/18 23:44 38.3 139 24 137/84 93 Room Air  











Physical Exam


Vital signs reviewed.





General: Well-appearing 53 year old female, in no significant distress, febrile.





HEENT: No scleral icterus, PERRLA, neck supple.  Atraumatic.





Cardiovascular: Tachycardic and regular rhythm, no extra sounds.





Pulmonary: Clear to auscultation bilaterally, normal work of breathing.





Abdomen: Soft, nontender, nondistended, positive bowel sounds.





Musculoskeletal: Atraumatic, no peripheral edema.





Neurologic: Patient awake alert and oriented x 3, full strength in all 4 

extremities.  Cranial nerves 2 through 12 grossly intact.





Skin: Warm, dry, no rash





Medical Decision & Procedures


ER Provider


Diagnostic Interpretation:


Radiology results as stated below per my review and radiologist interpretation:





ABDOMINAL X-RAY SERIES:





No evidence of obstruction. No free air. No focal infiltrate or pneumothorax.





Laboratory Results


4/13/18 00:15








Red Blood Count 4.59, Mean Corpuscular Volume 87.6, Mean Corpuscular Hemoglobin 

30.9, Mean Corpuscular Hemoglobin Concent 35.3, Mean Platelet Volume 9.5, 

Neutrophils (%) (Auto) 88.9, Lymphocytes (%) (Auto) 4.3, Monocytes (%) (Auto) 

5.3, Eosinophils (%) (Auto) 1.2, Basophils (%) (Auto) 0.1, Neutrophils # (Auto) 

7.53, Lymphocytes # (Auto) 0.36, Monocytes # (Auto) 0.45, Eosinophils # (Auto) 

0.10, Basophils # (Auto) 0.01





4/13/18 00:15

















Test


  4/13/18


00:15 4/13/18


00:36


 


White Blood Count


  8.47 K/uL


(4.8-10.8) 


 


 


Red Blood Count


  4.59 M/uL


(4.2-5.4) 


 


 


Hemoglobin


  14.2 g/dL


(12.0-16.0) 


 


 


Hematocrit 40.2 % (37-47)  


 


Mean Corpuscular Volume


  87.6 fL


() 


 


 


Mean Corpuscular Hemoglobin


  30.9 pg


(25-34) 


 


 


Mean Corpuscular Hemoglobin


Concent 35.3 g/dl


(32-36) 


 


 


Platelet Count


  173 K/uL


(130-400) 


 


 


Mean Platelet Volume


  9.5 fL


(7.4-10.4) 


 


 


Neutrophils (%) (Auto) 88.9 %  


 


Lymphocytes (%) (Auto) 4.3 %  


 


Monocytes (%) (Auto) 5.3 %  


 


Eosinophils (%) (Auto) 1.2 %  


 


Basophils (%) (Auto) 0.1 %  


 


Neutrophils # (Auto)


  7.53 K/uL


(1.4-6.5) 


 


 


Lymphocytes # (Auto)


  0.36 K/uL


(1.2-3.4) 


 


 


Monocytes # (Auto)


  0.45 K/uL


(0.11-0.59) 


 


 


Eosinophils # (Auto)


  0.10 K/uL


(0-0.5) 


 


 


Basophils # (Auto)


  0.01 K/uL


(0-0.2) 


 


 


RDW Standard Deviation


  43.4 fL


(36.4-46.3) 


 


 


RDW Coefficient of Variation


  13.6 %


(11.5-14.5) 


 


 


Immature Granulocyte % (Auto) 0.2 %  


 


Immature Granulocyte # (Auto)


  0.02 K/uL


(0.00-0.02) 


 


 


D-Dimer


  1680 ug/L FEU


(0-500) 


 


 


Anion Gap


  5.0 mmol/L


(3-11) 


 


 


Est Creatinine Clear Calc


Drug Dose 76.4 ml/min 


  


 


 


Estimated GFR (


American) 80.8 


  


 


 


Estimated GFR (Non-


American 69.7 


  


 


 


BUN/Creatinine Ratio 22.6 (10-20)  


 


Lactic Acid Level


  0.9 mmol/L


(0.4-2.0) 


 


 


Calcium Level


  8.4 mg/dl


(8.5-10.1) 


 


 


Magnesium Level


  1.7 mg/dl


(1.8-2.4) 


 


 


Total Bilirubin


  0.3 mg/dl


(0.2-1) 


 


 


Direct Bilirubin


  < 0.1 mg/dl


(0-0.2) 


 


 


Aspartate Amino Transf


(AST/SGOT) 36 U/L (15-37) 


  


 


 


Alanine Aminotransferase


(ALT/SGPT) 47 U/L (12-78) 


  


 


 


Alkaline Phosphatase


  116 U/L


() 


 


 


Total Protein


  7.5 gm/dl


(6.4-8.2) 


 


 


Albumin


  3.7 gm/dl


(3.4-5.0) 


 


 


Lipase


  193 U/L


() 


 


 


Urine Color  YELLOW 


 


Urine Appearance  CLEAR (CLEAR) 


 


Urine pH  5.0 (4.5-7.5) 


 


Urine Specific Gravity


  


  1.025


(1.000-1.030)


 


Urine Protein  NEG (NEG) 


 


Urine Glucose (UA)  NEG (NEG) 


 


Urine Ketones  NEG (NEG) 


 


Urine Occult Blood  3+ (NEG) 


 


Urine Nitrite  NEG (NEG) 


 


Urine Bilirubin  NEG (NEG) 


 


Urine Urobilinogen  NEG (NEG) 


 


Urine Leukocyte Esterase  NEG (NEG) 


 


Urine WBC (Auto)  1-5 /hpf (0-5) 


 


Urine RBC (Auto)


  


  5-10 /hpf


(0-4)


 


Urine Hyaline Casts (Auto)  1-5 /lpf (0-5) 


 


Urine Epithelial Cells (Auto)  >30 /lpf (0-5) 


 


Urine Bacteria (Auto)  NEG (NEG) 





Laboratory results per my review.





Medications Administered











 Medications


  (Trade)  Dose


 Ordered  Sig/Cj


 Route  Start Time


 Stop Time Status Last Admin


Dose Admin


 


 Sodium Chloride  1,000 ml @ 


 999 mls/hr  Q1H1M STAT


 IV  4/12/18 23:57


 4/13/18 00:57 DC 4/13/18 00:47


999 MLS/HR


 


 Ondansetron HCl


  (Zofran Inj)  4 mg  NOW  STAT


 IV  4/12/18 23:57


 4/12/18 23:59 DC 4/13/18 00:47


4 MG


 


 Acetaminophen  100 ml @ 


 400 mls/hr  NOW  STAT


 IV  4/13/18 01:06


 4/13/18 01:20 DC 4/13/18 01:44


400 MLS/HR


 


 Magnesium Sulfate


  (Magnesium


 Sulfate)  1 gm  NOW  STAT


 IV  4/13/18 01:23


 4/13/18 01:24 DC 4/13/18 02:06


1 GM


 


 Ondansetron HCl


  (ZOFRAN ODT 4MG


 Home Pack)  1 homepack  UD  ONCE


 PO  4/13/18 03:15


 4/13/18 03:16 DC 4/13/18 03:18


1 HOMEPACK











ED Course


2357: Ordered Zofran Injection 4 mg IV, Sodium Chloride 1000 ml @ 999 mls/hr IV.





0021: Past medical records reviewed. The patient was evaluated in room A12B. A 

complete history and physical examination was performed. 





0106: Ordered Sodium Chloride 1000 ml @ 999 mls/hr IV.





0123: Ordered Magnesium Sulfate 1 gm IV.





0159: I reevaluated the patient and updated her on her results. I discussed 

having a CT scan, which she agrees to.





Medical Decision





Differential diagnosis:


Etiologies such as gastroenteritis, food borne illness, infections, appendicitis

, diverticulitis, inflammatory bowel disease, obstruction, GI bleed, biliary 

pathology, as well as others were entertained.





This pt was evaluated and appeared to be in no distress.  IV access was 

obtained and lab work was drawn.  Pt was placed on the cardiac monitor.  IVF 

were initiated.  Pt was medicated with IV zofran and acetaminophen,  Lab work 

reveals a mild hypomagnesemia, elevated ddimer.  She was given 1 gm IV 

magnesium.  Abd XR series is negative for obstruction or FA to my 

interpretation.  CTA chest was negative for PE.  Pt was informed of the 

findings.  She was feeling much improved.  Pt was d/c with po zofran Hp as this 

is likely a viral process.  She will f.u with PCP this week and return to the 

ED for worsening of symptoms or any medical concerns.





Medication Reconcilliation


Current Medication List:  was personally reviewed by me





Blood Pressure Screening


Patient's blood pressure:  Elevated blood pressure


Blood pressure disposition:  Elevated BP felt to be situational





Impression





 Primary Impression:  


 Nausea, vomiting, and diarrhea


 Additional Impression:  


 Musculoskeletal chest pain





Scribe Attestation


The scribe's documentation has been prepared under my direction and personally 

reviewed by me in its entirety. I confirm that the note above accurately 

reflects all work, treatment, procedures, and medical decision making performed 

by me.





Departure Information


Prescriptions





No Active Prescriptions or Reported Meds





Referrals


No Doctor, Assigned (PCP)





Patient Instructions


My [facility]





Problem Qualifiers
Normal vision: sees adequately in most situations; can see medication labels, newsprint

## 2021-08-28 NOTE — H&P ADULT - ASSESSMENT
55 year old male       with PMH chronic back pain, DM, seizure disorder on lamotrigine       presents with abdominal pain x 2 weeks.       Pt reports left sided back pain radiating to LLQ over the past 2 weeks associated with nausea and multiple episodes of nonbloody nonbilious emesis.        Pt states passing flatus, last BM this morning, "soft" per pt.       Denies any fevers, chest pain, shortness of breath,  bloody stools, black tarry stools, dysuria, testicular pain, numbness, weakness, or rash.       . Denies any recent injury or trauma.  denies  any etoh use         abd pain from  ?  acute pancreatitis   pt with elevated lipase    CT A.p. normal pancreas    gi d r stephanie    clear liquids/ iv fluids   seizure  disorder, on meds/  anxiety, on prn  clonazepam  DM,  follow fs  HTN on norvasc/  atenolol/ benzapril at home  Ct  with   bursal collections' of  hip/  is   c/c    on  dvt t ppx  follow lipase  in am       < from: CT Abdomen and Pelvis w/ IV Cont (08.28.21 @ 06:02) >  IMPRESSION:  No evidence of small bowel obstruction or active bowel inflammation.  There are 2 iliopsoas bursal collections on the right as well as a 3.8 x 2.2 cm bursal collection along the left anterior hip. Pelvic MRI can be obtained for further characterization.  --- End of Report ---  < end of copied text >     55 year old male       with PMH chronic back pain, DM, seizure disorder on lamotrigine       presents with abdominal pain x 2 weeks.       Pt reports left sided back pain radiating to LLQ over the past 2 weeks associated with nausea and multiple episodes of nonbloody nonbilious emesis.        Pt states passing flatus, last BM this morning, "soft" per pt.       Denies any fevers, chest pain, shortness of breath,  bloody stools, black tarry stools, dysuria, testicular pain, numbness, weakness, or rash.       . Denies any recent injury or trauma.  denies  any etoh use         abd pain from  ?  acute pancreatitis   pt with elevated lipase    CT A.p. normal pancreas    gi d r stephanie    clear liquids/ iv fluids   seizure  disorder, on meds/  anxiety, on prn  clonazepam  DM,  follow fs  HTN on norvasc/  atenolol/ benzapril at home  Ct  with   bursal collections' of  hip//   ?  infection  mri pelvis, pending/ house id d r johnny/ need  to  r/o infection/ pt is afebrile   on  dvt t ppx  follow lipase  in am       < from: CT Abdomen and Pelvis w/ IV Cont (08.28.21 @ 06:02) >  IMPRESSION:  No evidence of small bowel obstruction or active bowel inflammation.  There are 2 iliopsoas bursal collections on the right as well as a 3.8 x 2.2 cm bursal collection along the left anterior hip. Pelvic MRI can be obtained for further characterization.  --- End of Report ---  < end of copied text >     55 year old male       with PMH chronic back pain,/  spinal suerg.  DM, seizure disorder on lamotrigine       presents with abdominal pain x 2 weeks.       Pt reports left sided back pain radiating to LLQ over the past 2 weeks associated with nausea and multiple episodes of nonbloody nonbilious emesis.        Pt states passing flatus, last BM this morning, "soft" per pt.       Denies any fevers, chest pain, shortness of breath,  bloody stools, black tarry stools, dysuria, testicular pain, numbness, weakness, or rash.       . Denies any recent injury or trauma.  denies  any etoh use         abd pain from  ?  acute pancreatitis   pt with elevated lipase    CT A.p. normal pancreas    gi d r stephanie    clear liquids/ iv fluids   seizure  disorder, on meds/  anxiety, on prn  clonazepam  DM,  follow fs  HTN on norvasc/  atenolol/ benzapril at home  pt has no back pain now/ prior  spinal surg  Ct  with   bursal collections' of  hip//   ?  infection  mri pelvis, pending/ house id d r johnny/ need  to  r/o infection/ pt is afebrile   on  dvt t ppx  follow lipase  in am       < from: CT Abdomen and Pelvis w/ IV Cont (08.28.21 @ 06:02) >  IMPRESSION:  No evidence of small bowel obstruction or active bowel inflammation.  There are 2 iliopsoas bursal collections on the right as well as a 3.8 x 2.2 cm bursal collection along the left anterior hip. Pelvic MRI can be obtained for further characterization.  --- End of Report ---  < end of copied text >

## 2021-08-28 NOTE — ED PROVIDER NOTE - NS ED ROS FT
Review of Systems    Constitutional: (-) fever, (-) chills, (-) fatigue  Cardiovascular: (-) chest pain, (-) syncope  Respiratory: (-) cough, (-) shortness of breath  Gastrointestinal: (+) vomiting, (-) diarrhea, (+) abdominal pain  Musculoskeletal: (-) neck pain, (+) back pain, (-) joint pain  Integumentary: (-) rash, (-) edema, (-) wound  Neurological: (-) headache, (-) altered mental status    Except as documented in the HPI, all other systems are negative.

## 2021-08-28 NOTE — ED PROVIDER NOTE - PROGRESS NOTE DETAILS
ANGELINE Perez (PGY-2) - pt w/ pancreatitis, no etoh, or hx of pancreatitis. will obtain RUQ us to assess for stones. TBA ANGELINE Perez (PGY-2) - pt w/ pancreatitis, no etoh, or hx of pancreatitis. will obtain RUQ us to assess for stones. TBA.

## 2021-08-28 NOTE — ED PROVIDER NOTE - NSICDXFAMILYHX_GEN_ALL_CORE_FT
FAMILY HISTORY:  Aunt  Still living? Unknown  Family history of seizures, Age at diagnosis: Age Unknown

## 2021-08-28 NOTE — ED PROVIDER NOTE - OBJECTIVE STATEMENT
55 year old male with PMH chronic back pain, DM, seizure disorder on lamotrigine presents with abdominal pain x 2 weeks. 55 year old male with PMH chronic back pain, DM, seizure disorder on lamotrigine presents with abdominal pain x 2 weeks. Pt reports left sided back pain radiating to LLQ over the past 2 weeks associated with nausea and multiple episodes of nonbloody nonbilious emesis. Pt reports initial constipation and urge to defecate, but unable to. Pt states passing flatus, last BM this morning, "soft" per pt. Denies any fevers, chest pain, shortness of breath,  bloody stools, black tarry stools, dysuria, testicular pain, numbness, weakness, or rash. Denies any recent illness. Denies any recent injury or trauma. Denies any additional complaints. 55 year old male with PMH chronic back pain, DM, seizure disorder on lamotrigine presents with abdominal pain x 2 weeks. Pt reports left sided back pain radiating to LLQ over the past 2 weeks associated with nausea and multiple episodes of nonbloody nonbilious emesis. Pt reports initial constipation and urge to defecate, but unable to. Pt states passing flatus, last BM this morning, "soft" per pt. Denies any fevers, chest pain, shortness of breath,  bloody stools, black tarry stools, dysuria, testicular pain, numbness, weakness, or rash. Denies any recent illness. Denies any recent injury or trauma. Denies any additional complaints.    PCP: Dr. IFEANYI Zacarias ([place] pres)

## 2021-08-28 NOTE — ED ADULT NURSE NOTE - OBJECTIVE STATEMENT
Pt is a 56 y/o male pmhx of HTN & T2DM presents to the ED complaining of abdominal pain & back pain x 2 weeks. Pt says pain is mainly in the LLQ but is also in the epigastric region and across the top of the abdomen & middle of the back. Endorses having nausea, vomiting & loose stools. Says he feels bloated. Pt presents alert & oriented x 4, calm, able to follow commands, speech clear. Breathing spontaneous & nonlabored. Abdomen soft & nondistended, LLQ tender to palpation. Strength 5/5 x 4 extremities, ambulates without assist. Strong peripheral pulses noted b/l, no edema noted. Skin warm, clean, dry & intact. Denies chest pain, SOB, dysuria, tarry stools, bloody stools, fever, chills, changes in vision. Call bell within reach, bed in lowest position, side rails up, wheels locked.

## 2021-08-28 NOTE — H&P ADULT - NSHPREVIEWOFSYSTEMS_GEN_ALL_CORE
REVIEW OF SYSTEMS:  GEN: no fever,    no chills  RESP: no SOB,   no cough  CVS: no chest pain,   no palpitations  GI:   abdominal pain,   no nausea,   no vomiting,   no constipation,   no diarrhea  : no dysuria,   no frequency  NEURO: no headache,   no dizziness  PSYCH: no depression,   not anxious  Derm : no rash

## 2021-08-28 NOTE — ED PROVIDER NOTE - CLINICAL SUMMARY MEDICAL DECISION MAKING FREE TEXT BOX
Ruby-PGY2: 55 year old male with PMH chronic back pain, DM, seizure disorder on lamotrigine presents with abdominal pain x 2 weeks. Pt reports left sided back pain radiating to LLQ over the past 2 weeks associated with nausea and multiple episodes of nonbloody nonbilious emesis. Pt reports initial constipation and urge to defecate, but unable to. Pt states passing flatus, last BM this morning, "soft" per pt. Denies any fevers, chest pain, shortness of breath,  bloody stools, black tarry stools, dysuria, testicular pain, numbness, weakness, or rash. Denies any recent illness. Denies any recent injury or trauma. Unclear etiology, possible diverticulitis as pt with LLQ tenderness. Unlikely pyelonephritis/stone as gradual onset, no dysuria. No obstructive symptoms. Will obtain labs, imaging, supportive treatment and reassessment with dispo accordingly.

## 2021-08-28 NOTE — H&P ADULT - NSCORESITESY/N_GEN_A_CORE_RD
07/06/17 1452   Final Note   Assessment Type Final Discharge Note   Discharge Disposition Psych  ([facility])   Discharge planning education complete? Yes      No

## 2021-08-28 NOTE — H&P ADULT - NSHPLABSRESULTS_GEN_ALL_CORE
LABS:                        13.4   10.64 )-----------( 203      ( 28 Aug 2021 04:18 )             39.6     08-28    138  |  102  |  25<H>  ----------------------------<  161<H>  4.1   |  20<L>  |  0.96    Ca    10.7<H>      28 Aug 2021 04:26    TPro  7.6  /  Alb  4.5  /  TBili  0.2  /  DBili  x   /  AST  12  /  ALT  14  /  AlkPhos  76  08-28          Urinalysis Basic - ( 28 Aug 2021 07:27 )    Color: Light Yellow / Appearance: Clear / SG: >1.050 / pH: x  Gluc: x / Ketone: Negative  / Bili: Negative / Urobili: Negative   Blood: x / Protein: Trace / Nitrite: Negative   Leuk Esterase: Negative / RBC: 1 /hpf / WBC 0 /HPF   Sq Epi: x / Non Sq Epi: 0 /hpf / Bacteria: Negative          08-28 @ 04:18  4.3  44

## 2021-08-28 NOTE — H&P ADULT - NSHPPHYSICALEXAM_GEN_ALL_CORE
PHYSICAL EXAMINATION:  Vital Signs Last 24 Hrs  T(C): 37.1 (28 Aug 2021 09:35), Max: 37.1 (28 Aug 2021 09:35)  T(F): 98.7 (28 Aug 2021 09:35), Max: 98.7 (28 Aug 2021 09:35)  HR: 84 (28 Aug 2021 12:53) (82 - 102)  BP: 154/88 (28 Aug 2021 12:53) (145/80 - 162/100)  BP(mean): --  RR: 18 (28 Aug 2021 09:35) (18 - 20)  SpO2: 97% (28 Aug 2021 09:35) (97% - 100%)  CAPILLARY BLOOD GLUCOSE            GENERAL: NAD, well-groomed,  HEAD:  atraumatic, normocephalic  EYES: sclera anicteric  ENMT: mucous membranes moist  NECK: supple, No JVD  CHEST/LUNG: clear to auscultation bilaterally;    no      rales   ,   no rhonchi,   HEART: normal S1, S2  ABDOMEN: BS+, soft, ND, NT   EXTREMITIES:    no    edema    b/l LEs  NEURO: awake, ,     moves all extremities  SKIN: no     rash

## 2021-08-28 NOTE — CONSULT NOTE ADULT - SUBJECTIVE AND OBJECTIVE BOX
CHIEF COMPLAINT:Patient is a 55y old  Male who presents with a chief complaint of     HPI:  55 year old male with PMH chronic back pain, ht, DM, seizure disorder on lamotrigine presents with abdominal pain x 2 weeks. Pt reports left sided back pain radiating to LLQ over the past 2 weeks associated with nausea and multiple episodes of nonbloody nonbilious emesis. Pt reports initial constipation and urge to defecate, but unable to. Pt states passing flatus, last BM this morning, "soft" per pt. Denies any fevers, chest pain, shortness of breath,  bloody stools, black tarry stools, dysuria, testicular pain, numbness, weakness, or rash. Denies any recent illness. Denies any recent injury or trauma. Denies any additional complaints  in er pt npo with increase bp, called to adjust bp meds iv as needed  pt on atenolol and Amlodipine/ benazapril at home.  no chest pain/ sob.    PAST MEDICAL & SURGICAL HISTORY:  Diabetes    Type II or unspecified type diabetes mellitus without mention of complication, not stated as uncontr    Depressive disorder, not elsewhere classified    Benign Hypertension    Hypercholesteremia    Cervical disc disease with myelopathy    Cervical disc disorder with radiculopathy    Cervical disc displacement    Seizure  Since 2007, last seizure Fall 2018    Seizure disorder  generalized ,   Since 2007, last seizure Fall 2018    Lumbar radiculopathy    Enlargement (benign) of prostate    Status Post Carpal Tunnel Release  bilateral    H/O cervical spine surgery  2010        MEDICATIONS  (STANDING):    MEDICATIONS  (PRN):      FAMILY HISTORY:  Family history of seizures (Aunt)        SOCIAL HISTORY:    [ ] Non-smoker  [ ] Smoker  [ ] Alcohol    Allergies    No Known Allergies    Intolerances    	    REVIEW OF SYSTEMS:  CONSTITUTIONAL: No fever, weight loss, or fatigue  EYES: No eye pain, visual disturbances, or discharge  ENT:  No difficulty hearing, tinnitus, vertigo; No sinus or throat pain  NECK: No pain or stiffness  RESPIRATORY: No cough, wheezing, chills or hemoptysis; No Shortness of Breath  CARDIOVASCULAR: No chest pain, palpitations, passing out, dizziness, or leg swelling  GASTROINTESTINAL: + abdominal or epigastric pain. No nausea, vomiting, or hematemesis; No diarrhea or constipation. No melena or hematochezia.  GENITOURINARY: No dysuria, frequency, hematuria, or incontinence  NEUROLOGICAL: No headaches, memory loss, loss of strength, numbness, or tremors  SKIN: No itching, burning, rashes, or lesions   LYMPH Nodes: No enlarged glands  ENDOCRINE: No heat or cold intolerance; No hair loss  MUSCULOSKELETAL: No joint pain or swelling; No muscle, back, or extremity pain  PSYCHIATRIC: No depression, anxiety, mood swings, or difficulty sleeping  HEME/LYMPH: No easy bruising, or bleeding gums  ALLERGY AND IMMUNOLOGIC: No hives or eczema	    [ ] All others negative	  [ ] Unable to obtain    PHYSICAL EXAM:  T(C): 37.1 (08-28-21 @ 09:35), Max: 37.1 (08-28-21 @ 09:35)  HR: 82 (08-28-21 @ 09:35) (82 - 102)  BP: 145/80 (08-28-21 @ 09:35) (145/80 - 162/100)  RR: 18 (08-28-21 @ 09:35) (18 - 20)  SpO2: 97% (08-28-21 @ 09:35) (97% - 100%)  Wt(kg): --  I&O's Summary      Appearance: Normal	  HEENT:   Normal oral mucosa, PERRL, EOMI	  Lymphatic: No lymphadenopathy  Cardiovascular: Normal S1 S2, No JVD, + murmurs, No edema  Respiratory: Lungs clear to auscultation	  Psychiatry: A & O x 3, Mood & affect appropriate  Gastrointestinal:  Soft, Non-tender, + BS	  Skin: No rashes, No ecchymoses, No cyanosis	  Neurologic: Non-focal  Extremities: Normal range of motion, No clubbing, cyanosis or edema  Vascular: Peripheral pulses palpable 2+ bilaterally    TELEMETRY: 	    ECG:  	  RADIOLOGY:  OTHER: 	  	  LABS:	 	    CARDIAC MARKERS:                              13.4   10.64 )-----------( 203      ( 28 Aug 2021 04:18 )             39.6     08-28    138  |  102  |  25<H>  ----------------------------<  161<H>  4.1   |  20<L>  |  0.96    Ca    10.7<H>      28 Aug 2021 04:26    TPro  7.6  /  Alb  4.5  /  TBili  0.2  /  DBili  x   /  AST  12  /  ALT  14  /  AlkPhos  76  08-28    proBNP:   Lipid Profile: Cholesterol --  LDL --  HDL --      HgA1c:   TSH:       PREVIOUS DIAGNOSTIC TESTING:     < from: 12 Lead ECG (12.26.19 @ 19:43) >  Diagnosis Line NORMAL SINUS RHYTHM  NORMAL ECG    < from: CT Abdomen and Pelvis w/ IV Cont (08.28.21 @ 06:02) >  LOWER CHEST: within normal limits.    ABDOMEN:  LIVER: within normal limits.  BILE DUCTS: normal caliber.  GALLBLADDER: No calcified gallstones. Normal caliber wall.  PANCREAS: within normal limits.  SPLEEN: within normal limits.  ADRENALS: within normal limits.  KIDNEYS/URETERS: Subcentimeter exophytic left renal cyst.    PELVIS:  REPRODUCTIVE ORGANS: no pelvic masses.  BLADDER: within normal limits.      BOWEL: Portions of the gastrointestinal tract are collapsed, limiting evaluation. No small bowel obstruction or active bowel inflammation. The appendix is normal.  PERITONEUM: no ascites or free air, no fluid collection.  VESSELS: Atherosclerotic changes  RETROPERITONEUM: within normal limits.  ABDOMINAL WALL: There are 2 iliopsoas bursal collections on the right as well as a 3.8 x 2.2 cm bursal collection along the left anterior hip.  BONES: The patient has undergone posterior spine fusion at L4-5 with bilateral pedicle screws and rods and decompressive laminectomies. There is intervertebral disc spacer at L4-5. Streak artifact limits evaluation of the surrounding soft tissues.    IMPRESSION:    No evidence of small bowel obstruction or active bowel inflammation.    There are 2 iliopsoas bursal collections on the right as well as a 3.8 x 2.2 cm bursal collection along the left anterior hip. Pelvic MRI can be obtained for further characterization.    < end of copied text >

## 2021-08-28 NOTE — H&P ADULT - HISTORY OF PRESENT ILLNESS
55 year old male       with PMH chronic back pain, DM, seizure disorder on lamotrigine       presents with abdominal pain x 2 weeks.       Pt reports left sided back pain radiating to LLQ over the past 2 weeks associated with nausea and multiple episodes of nonbloody nonbilious emesis.        Pt states passing flatus, last BM this morning, "soft" per pt.       Denies any fevers, chest pain, shortness of breath,  bloody stools, black tarry stools, dysuria, testicular pain, numbness, weakness, or rash.       . Denies any recent injury or trauma.  denies  any etoh use

## 2021-08-29 DIAGNOSIS — K85.90 ACUTE PANCREATITIS WITHOUT NECROSIS OR INFECTION, UNSPECIFIED: ICD-10-CM

## 2021-08-29 DIAGNOSIS — M71.9 BURSOPATHY, UNSPECIFIED: ICD-10-CM

## 2021-08-29 DIAGNOSIS — R10.9 UNSPECIFIED ABDOMINAL PAIN: ICD-10-CM

## 2021-08-29 LAB
A1C WITH ESTIMATED AVERAGE GLUCOSE RESULT: 6.5 % — HIGH (ref 4–5.6)
ALBUMIN SERPL ELPH-MCNC: 4.1 G/DL — SIGNIFICANT CHANGE UP (ref 3.3–5)
ALP SERPL-CCNC: 81 U/L — SIGNIFICANT CHANGE UP (ref 40–120)
ALT FLD-CCNC: 12 U/L — SIGNIFICANT CHANGE UP (ref 10–45)
AMYLASE P1 CFR SERPL: 98 U/L — SIGNIFICANT CHANGE UP (ref 25–125)
ANION GAP SERPL CALC-SCNC: 13 MMOL/L — SIGNIFICANT CHANGE UP (ref 5–17)
AST SERPL-CCNC: 11 U/L — SIGNIFICANT CHANGE UP (ref 10–40)
BILIRUB SERPL-MCNC: 0.6 MG/DL — SIGNIFICANT CHANGE UP (ref 0.2–1.2)
BUN SERPL-MCNC: 13 MG/DL — SIGNIFICANT CHANGE UP (ref 7–23)
CALCIUM SERPL-MCNC: 9.6 MG/DL — SIGNIFICANT CHANGE UP (ref 8.4–10.5)
CHLORIDE SERPL-SCNC: 102 MMOL/L — SIGNIFICANT CHANGE UP (ref 96–108)
CO2 SERPL-SCNC: 22 MMOL/L — SIGNIFICANT CHANGE UP (ref 22–31)
CREAT SERPL-MCNC: 0.85 MG/DL — SIGNIFICANT CHANGE UP (ref 0.5–1.3)
CULTURE RESULTS: NO GROWTH — SIGNIFICANT CHANGE UP
ERYTHROCYTE [SEDIMENTATION RATE] IN BLOOD: 50 MM/HR — HIGH (ref 0–20)
ESTIMATED AVERAGE GLUCOSE: 140 MG/DL — HIGH (ref 68–114)
GLUCOSE BLDC GLUCOMTR-MCNC: 124 MG/DL — HIGH (ref 70–99)
GLUCOSE BLDC GLUCOMTR-MCNC: 128 MG/DL — HIGH (ref 70–99)
GLUCOSE BLDC GLUCOMTR-MCNC: 132 MG/DL — HIGH (ref 70–99)
GLUCOSE BLDC GLUCOMTR-MCNC: 257 MG/DL — HIGH (ref 70–99)
GLUCOSE BLDC GLUCOMTR-MCNC: 95 MG/DL — SIGNIFICANT CHANGE UP (ref 70–99)
GLUCOSE SERPL-MCNC: 147 MG/DL — HIGH (ref 70–99)
HCT VFR BLD CALC: 40.5 % — SIGNIFICANT CHANGE UP (ref 39–50)
HCV AB S/CO SERPL IA: 0.1 S/CO — SIGNIFICANT CHANGE UP (ref 0–0.99)
HCV AB SERPL-IMP: SIGNIFICANT CHANGE UP
HGB BLD-MCNC: 13.8 G/DL — SIGNIFICANT CHANGE UP (ref 13–17)
LIDOCAIN IGE QN: 193 U/L — HIGH (ref 7–60)
MCHC RBC-ENTMCNC: 31.2 PG — SIGNIFICANT CHANGE UP (ref 27–34)
MCHC RBC-ENTMCNC: 34.1 GM/DL — SIGNIFICANT CHANGE UP (ref 32–36)
MCV RBC AUTO: 91.6 FL — SIGNIFICANT CHANGE UP (ref 80–100)
NRBC # BLD: 0 /100 WBCS — SIGNIFICANT CHANGE UP (ref 0–0)
PLATELET # BLD AUTO: 194 K/UL — SIGNIFICANT CHANGE UP (ref 150–400)
POTASSIUM SERPL-MCNC: 3.9 MMOL/L — SIGNIFICANT CHANGE UP (ref 3.5–5.3)
POTASSIUM SERPL-SCNC: 3.9 MMOL/L — SIGNIFICANT CHANGE UP (ref 3.5–5.3)
PROT SERPL-MCNC: 7 G/DL — SIGNIFICANT CHANGE UP (ref 6–8.3)
RBC # BLD: 4.42 M/UL — SIGNIFICANT CHANGE UP (ref 4.2–5.8)
RBC # FLD: 13 % — SIGNIFICANT CHANGE UP (ref 10.3–14.5)
SODIUM SERPL-SCNC: 137 MMOL/L — SIGNIFICANT CHANGE UP (ref 135–145)
SPECIMEN SOURCE: SIGNIFICANT CHANGE UP
TRIGL SERPL-MCNC: 77 MG/DL — SIGNIFICANT CHANGE UP
WBC # BLD: 8.05 K/UL — SIGNIFICANT CHANGE UP (ref 3.8–10.5)
WBC # FLD AUTO: 8.05 K/UL — SIGNIFICANT CHANGE UP (ref 3.8–10.5)

## 2021-08-29 PROCEDURE — 99222 1ST HOSP IP/OBS MODERATE 55: CPT

## 2021-08-29 RX ORDER — INSULIN LISPRO 100/ML
VIAL (ML) SUBCUTANEOUS
Refills: 0 | Status: DISCONTINUED | OUTPATIENT
Start: 2021-08-29 | End: 2021-09-01

## 2021-08-29 RX ORDER — ACETAMINOPHEN 500 MG
650 TABLET ORAL ONCE
Refills: 0 | Status: COMPLETED | OUTPATIENT
Start: 2021-08-29 | End: 2021-08-29

## 2021-08-29 RX ORDER — GLUCAGON INJECTION, SOLUTION 0.5 MG/.1ML
1 INJECTION, SOLUTION SUBCUTANEOUS ONCE
Refills: 0 | Status: DISCONTINUED | OUTPATIENT
Start: 2021-08-29 | End: 2021-09-01

## 2021-08-29 RX ORDER — ACETAMINOPHEN 500 MG
650 TABLET ORAL EVERY 6 HOURS
Refills: 0 | Status: DISCONTINUED | OUTPATIENT
Start: 2021-08-29 | End: 2021-08-31

## 2021-08-29 RX ORDER — OXYCODONE AND ACETAMINOPHEN 5; 325 MG/1; MG/1
1 TABLET ORAL ONCE
Refills: 0 | Status: DISCONTINUED | OUTPATIENT
Start: 2021-08-29 | End: 2021-08-29

## 2021-08-29 RX ORDER — LOSARTAN POTASSIUM 100 MG/1
50 TABLET, FILM COATED ORAL DAILY
Refills: 0 | Status: DISCONTINUED | OUTPATIENT
Start: 2021-08-29 | End: 2021-09-01

## 2021-08-29 RX ORDER — DEXTROSE 50 % IN WATER 50 %
12.5 SYRINGE (ML) INTRAVENOUS ONCE
Refills: 0 | Status: DISCONTINUED | OUTPATIENT
Start: 2021-08-29 | End: 2021-09-01

## 2021-08-29 RX ORDER — SODIUM CHLORIDE 9 MG/ML
1000 INJECTION, SOLUTION INTRAVENOUS
Refills: 0 | Status: DISCONTINUED | OUTPATIENT
Start: 2021-08-29 | End: 2021-09-01

## 2021-08-29 RX ORDER — DEXTROSE 50 % IN WATER 50 %
15 SYRINGE (ML) INTRAVENOUS ONCE
Refills: 0 | Status: DISCONTINUED | OUTPATIENT
Start: 2021-08-29 | End: 2021-09-01

## 2021-08-29 RX ORDER — ONDANSETRON 8 MG/1
4 TABLET, FILM COATED ORAL ONCE
Refills: 0 | Status: COMPLETED | OUTPATIENT
Start: 2021-08-29 | End: 2021-08-29

## 2021-08-29 RX ORDER — DEXTROSE 50 % IN WATER 50 %
25 SYRINGE (ML) INTRAVENOUS ONCE
Refills: 0 | Status: DISCONTINUED | OUTPATIENT
Start: 2021-08-29 | End: 2021-09-01

## 2021-08-29 RX ADMIN — HEPARIN SODIUM 5000 UNIT(S): 5000 INJECTION INTRAVENOUS; SUBCUTANEOUS at 06:28

## 2021-08-29 RX ADMIN — LAMOTRIGINE 225 MILLIGRAM(S): 25 TABLET, ORALLY DISINTEGRATING ORAL at 18:09

## 2021-08-29 RX ADMIN — HEPARIN SODIUM 5000 UNIT(S): 5000 INJECTION INTRAVENOUS; SUBCUTANEOUS at 18:09

## 2021-08-29 RX ADMIN — OXYCODONE AND ACETAMINOPHEN 1 TABLET(S): 5; 325 TABLET ORAL at 06:31

## 2021-08-29 RX ADMIN — PANTOPRAZOLE SODIUM 40 MILLIGRAM(S): 20 TABLET, DELAYED RELEASE ORAL at 06:29

## 2021-08-29 RX ADMIN — ONDANSETRON 4 MILLIGRAM(S): 8 TABLET, FILM COATED ORAL at 05:42

## 2021-08-29 RX ADMIN — LOSARTAN POTASSIUM 50 MILLIGRAM(S): 100 TABLET, FILM COATED ORAL at 10:17

## 2021-08-29 RX ADMIN — Medication 0: at 17:46

## 2021-08-29 RX ADMIN — Medication 650 MILLIGRAM(S): at 15:40

## 2021-08-29 RX ADMIN — Medication 650 MILLIGRAM(S): at 16:10

## 2021-08-29 RX ADMIN — Medication 3: at 13:20

## 2021-08-29 RX ADMIN — Medication 0: at 09:12

## 2021-08-29 RX ADMIN — SODIUM CHLORIDE 100 MILLILITER(S): 9 INJECTION, SOLUTION INTRAVENOUS at 10:17

## 2021-08-29 RX ADMIN — LAMOTRIGINE 225 MILLIGRAM(S): 25 TABLET, ORALLY DISINTEGRATING ORAL at 06:28

## 2021-08-29 RX ADMIN — TAMSULOSIN HYDROCHLORIDE 0.8 MILLIGRAM(S): 0.4 CAPSULE ORAL at 22:31

## 2021-08-29 RX ADMIN — ATENOLOL 50 MILLIGRAM(S): 25 TABLET ORAL at 06:29

## 2021-08-29 RX ADMIN — AMLODIPINE BESYLATE 5 MILLIGRAM(S): 2.5 TABLET ORAL at 06:29

## 2021-08-29 RX ADMIN — OXYCODONE AND ACETAMINOPHEN 1 TABLET(S): 5; 325 TABLET ORAL at 07:00

## 2021-08-29 RX ADMIN — ATORVASTATIN CALCIUM 80 MILLIGRAM(S): 80 TABLET, FILM COATED ORAL at 22:31

## 2021-08-29 NOTE — CONSULT NOTE ADULT - SUBJECTIVE AND OBJECTIVE BOX
KAYEPEACE CRUZ 55y Male  MRN-[id number]    Patient is a 55y old  Male who presents with a chief complaint of abd pain (28 Aug 2021 15:01)      HPI:  55 year old male with PMH chronic back pain, DM, seizure disorder on lamotrigine presents with abdominal pain x 2 weeks.       Pt reports left sided back pain radiating to LLQ over the past 2 weeks associated with nausea and multiple episodes of nonbloody nonbilious emesis.        Pt states passing flatus, last BM this morning, "soft" per pt.       Denies any fevers, chest pain, shortness of breath,  bloody stools, black tarry stools, dysuria, testicular pain, numbness, weakness, or rash.       . Denies any recent injury or trauma.  denies  any etoh use    Found to have pancreatitis. No fever.     Noted with left and right iliopsoas bursal collections. ID called.      PAST MEDICAL & SURGICAL HISTORY:  Diabetes    Type II or unspecified type diabetes mellitus without mention of complication, not stated as uncontr    Depressive disorder, not elsewhere classified    Benign Hypertension    Hypercholesteremia    Cervical disc disease with myelopathy    Cervical disc disorder with radiculopathy    Cervical disc displacement    Seizure  Since 2007, last seizure Fall 2018    Seizure disorder  generalized ,   Since 2007, last seizure Fall 2018    Lumbar radiculopathy    Enlargement (benign) of prostate    Status Post Carpal Tunnel Release  bilateral    H/O cervical spine surgery  2010        Allergies    No Known Allergies    Intolerances        ANTIMICROBIALS:      MEDICATIONS  (STANDING):  amLODIPine   Tablet 5 milliGRAM(s) Oral daily  ATENolol  Tablet 50 milliGRAM(s) Oral daily  atorvastatin 80 milliGRAM(s) Oral at bedtime  dextrose 40% Gel 15 Gram(s) Oral once  dextrose 5%. 1000 milliLiter(s) (50 mL/Hr) IV Continuous <Continuous>  dextrose 5%. 1000 milliLiter(s) (100 mL/Hr) IV Continuous <Continuous>  dextrose 50% Injectable 25 Gram(s) IV Push once  dextrose 50% Injectable 12.5 Gram(s) IV Push once  dextrose 50% Injectable 25 Gram(s) IV Push once  glucagon  Injectable 1 milliGRAM(s) IntraMuscular once  heparin   Injectable 5000 Unit(s) SubCutaneous every 12 hours  insulin lispro (ADMELOG) corrective regimen sliding scale   SubCutaneous three times a day before meals  lactated ringers. 1000 milliLiter(s) (100 mL/Hr) IV Continuous <Continuous>  lamoTRIgine 225 milliGRAM(s) Oral two times a day  LORazepam     Tablet 1 milliGRAM(s) Oral once  pantoprazole    Tablet 40 milliGRAM(s) Oral before breakfast  tamsulosin 0.8 milliGRAM(s) Oral at bedtime      Social History  Smoking:  Etoh:  Drug use:      FAMILY HISTORY:  Family history of seizures (Aunt)        Vital Signs Last 24 Hrs  T(C): 36.9 (29 Aug 2021 05:41), Max: 37.1 (28 Aug 2021 09:35)  T(F): 98.5 (29 Aug 2021 05:41), Max: 98.7 (28 Aug 2021 09:35)  HR: 98 (29 Aug 2021 05:41) (76 - 98)  BP: 160/79 (29 Aug 2021 05:41) (145/80 - 166/80)  BP(mean): --  RR: 18 (29 Aug 2021 05:41) (18 - 18)  SpO2: 99% (29 Aug 2021 05:41) (97% - 99%)    CBC Full  -  ( 28 Aug 2021 04:18 )  WBC Count : 10.64 K/uL  RBC Count : 4.38 M/uL  Hemoglobin : 13.4 g/dL  Hematocrit : 39.6 %  Platelet Count - Automated : 203 K/uL  Mean Cell Volume : 90.4 fl  Mean Cell Hemoglobin : 30.6 pg  Mean Cell Hemoglobin Concentration : 33.8 gm/dL  Auto Neutrophil # : 6.22 K/uL  Auto Lymphocyte # : 3.34 K/uL  Auto Monocyte # : 0.89 K/uL  Auto Eosinophil # : 0.12 K/uL  Auto Basophil # : 0.05 K/uL  Auto Neutrophil % : 58.4 %  Auto Lymphocyte % : 31.4 %  Auto Monocyte % : 8.4 %  Auto Eosinophil % : 1.1 %  Auto Basophil % : 0.5 %    08-28    138  |  102  |  25<H>  ----------------------------<  161<H>  4.1   |  20<L>  |  0.96    Ca    10.7<H>      28 Aug 2021 04:26    TPro  7.6  /  Alb  4.5  /  TBili  0.2  /  DBili  x   /  AST  12  /  ALT  14  /  AlkPhos  76  08-28    LIVER FUNCTIONS - ( 28 Aug 2021 04:26 )  Alb: 4.5 g/dL / Pro: 7.6 g/dL / ALK PHOS: 76 U/L / ALT: 14 U/L / AST: 12 U/L / GGT: x           Urinalysis Basic - ( 28 Aug 2021 07:27 )    Color: Light Yellow / Appearance: Clear / SG: >1.050 / pH: x  Gluc: x / Ketone: Negative  / Bili: Negative / Urobili: Negative   Blood: x / Protein: Trace / Nitrite: Negative   Leuk Esterase: Negative / RBC: 1 /hpf / WBC 0 /HPF   Sq Epi: x / Non Sq Epi: 0 /hpf / Bacteria: Negative        MICROBIOLOGY:        RADIOLOGY  < from: US Abdomen Upper Quadrant Right (08.28.21 @ 09:46) >  IMPRESSION: No focal mass lesions, cholelithiasis, or intraductal biliary dilatation.    < end of copied text >  < from: CT Abdomen and Pelvis w/ IV Cont (08.28.21 @ 06:02) >  No evidence of small bowel obstruction or active bowel inflammation.    There are 2 iliopsoas bursal collections on the right as well as a 3.8 x 2.2 cm bursal collection along the left anterior hip. Pelvic MRI can be obtained for further characterization.    < end of copied text >   KAYEPEACE CRUZ 55y Male  [id number]    Patient is a 55y old  Male who presents with a chief complaint of abd pain (28 Aug 2021 15:01)      HPI:  55 year old male with PMH chronic back pain, DM, seizure disorder on lamotrigine presents with abdominal pain x 2 weeks.       Pt reports left sided back pain radiating to LLQ over the past 2 weeks associated with nausea and multiple episodes of nonbloody nonbilious emesis.        Pt states passing flatus, last BM this morning, "soft" per pt.       Denies any fevers, chest pain, shortness of breath,  bloody stools, black tarry stools, dysuria, testicular pain, numbness, weakness, or rash.       . Denies any recent injury or trauma.  denies  any etoh use    Found to have pancreatitis. No fever.     Noted with left and right iliopsoas bursal collections. ID called.    H/o fall on left hip few months ago.       PAST MEDICAL & SURGICAL HISTORY:  Diabetes    Type II or unspecified type diabetes mellitus without mention of complication, not stated as uncontr    Depressive disorder, not elsewhere classified    Benign Hypertension    Hypercholesteremia    Cervical disc disease with myelopathy    Cervical disc disorder with radiculopathy    Cervical disc displacement    Seizure  Since 2007, last seizure Fall 2018    Seizure disorder  generalized ,   Since 2007, last seizure Fall 2018    Lumbar radiculopathy    Enlargement (benign) of prostate    Status Post Carpal Tunnel Release  bilateral    H/O cervical spine surgery  2010        Allergies    No Known Allergies    Intolerances        ANTIMICROBIALS:      MEDICATIONS  (STANDING):  amLODIPine   Tablet 5 milliGRAM(s) Oral daily  ATENolol  Tablet 50 milliGRAM(s) Oral daily  atorvastatin 80 milliGRAM(s) Oral at bedtime  dextrose 40% Gel 15 Gram(s) Oral once  dextrose 5%. 1000 milliLiter(s) (50 mL/Hr) IV Continuous <Continuous>  dextrose 5%. 1000 milliLiter(s) (100 mL/Hr) IV Continuous <Continuous>  dextrose 50% Injectable 25 Gram(s) IV Push once  dextrose 50% Injectable 12.5 Gram(s) IV Push once  dextrose 50% Injectable 25 Gram(s) IV Push once  glucagon  Injectable 1 milliGRAM(s) IntraMuscular once  heparin   Injectable 5000 Unit(s) SubCutaneous every 12 hours  insulin lispro (ADMELOG) corrective regimen sliding scale   SubCutaneous three times a day before meals  lactated ringers. 1000 milliLiter(s) (100 mL/Hr) IV Continuous <Continuous>  lamoTRIgine 225 milliGRAM(s) Oral two times a day  LORazepam     Tablet 1 milliGRAM(s) Oral once  pantoprazole    Tablet 40 milliGRAM(s) Oral before breakfast  tamsulosin 0.8 milliGRAM(s) Oral at bedtime      Social History  Smoking: no  Etoh: no  Drug use: no      FAMILY HISTORY:  Family history of seizures (Aunt)        Vital Signs Last 24 Hrs  T(C): 36.9 (29 Aug 2021 05:41), Max: 37.1 (28 Aug 2021 09:35)  T(F): 98.5 (29 Aug 2021 05:41), Max: 98.7 (28 Aug 2021 09:35)  HR: 98 (29 Aug 2021 05:41) (76 - 98)  BP: 160/79 (29 Aug 2021 05:41) (145/80 - 166/80)  BP(mean): --  RR: 18 (29 Aug 2021 05:41) (18 - 18)  SpO2: 99% (29 Aug 2021 05:41) (97% - 99%)    CBC Full  -  ( 28 Aug 2021 04:18 )  WBC Count : 10.64 K/uL  RBC Count : 4.38 M/uL  Hemoglobin : 13.4 g/dL  Hematocrit : 39.6 %  Platelet Count - Automated : 203 K/uL  Mean Cell Volume : 90.4 fl  Mean Cell Hemoglobin : 30.6 pg  Mean Cell Hemoglobin Concentration : 33.8 gm/dL  Auto Neutrophil # : 6.22 K/uL  Auto Lymphocyte # : 3.34 K/uL  Auto Monocyte # : 0.89 K/uL  Auto Eosinophil # : 0.12 K/uL  Auto Basophil # : 0.05 K/uL  Auto Neutrophil % : 58.4 %  Auto Lymphocyte % : 31.4 %  Auto Monocyte % : 8.4 %  Auto Eosinophil % : 1.1 %  Auto Basophil % : 0.5 %    08-28    138  |  102  |  25<H>  ----------------------------<  161<H>  4.1   |  20<L>  |  0.96    Ca    10.7<H>      28 Aug 2021 04:26    TPro  7.6  /  Alb  4.5  /  TBili  0.2  /  DBili  x   /  AST  12  /  ALT  14  /  AlkPhos  76  08-28    LIVER FUNCTIONS - ( 28 Aug 2021 04:26 )  Alb: 4.5 g/dL / Pro: 7.6 g/dL / ALK PHOS: 76 U/L / ALT: 14 U/L / AST: 12 U/L / GGT: x           Urinalysis Basic - ( 28 Aug 2021 07:27 )    Color: Light Yellow / Appearance: Clear / SG: >1.050 / pH: x  Gluc: x / Ketone: Negative  / Bili: Negative / Urobili: Negative   Blood: x / Protein: Trace / Nitrite: Negative   Leuk Esterase: Negative / RBC: 1 /hpf / WBC 0 /HPF   Sq Epi: x / Non Sq Epi: 0 /hpf / Bacteria: Negative        MICROBIOLOGY:        RADIOLOGY  < from: US Abdomen Upper Quadrant Right (08.28.21 @ 09:46) >  IMPRESSION: No focal mass lesions, cholelithiasis, or intraductal biliary dilatation.    < end of copied text >  < from: CT Abdomen and Pelvis w/ IV Cont (08.28.21 @ 06:02) >  No evidence of small bowel obstruction or active bowel inflammation.    There are 2 iliopsoas bursal collections on the right as well as a 3.8 x 2.2 cm bursal collection along the left anterior hip. Pelvic MRI can be obtained for further characterization.    < end of copied text >

## 2021-08-29 NOTE — CONSULT NOTE ADULT - ASSESSMENT
55 year old male with PMH chronic back pain, ht, DM, seizure disorder on lamotrigine presents with abdominal pain x 2 weeks. Pt reports left sided back pain radiating to LLQ over the past 2 weeks associated with nausea and multiple episodes of nonbloody nonbilious emesis. Pt reports initial constipation and urge to defecate, but unable to. Pt states passing flatus, last BM this morning, "soft" per pt. Denies any fevers, chest pain, shortness of breath,  bloody stools, black tarry stools, dysuria, testicular pain, numbness, weakness, or rash. Denies any recent illness. Denies any recent injury or trauma. Denies any additional complaints  in er pt npo with increase bp, called to adjust bp meds iv as needed  pt on atenolol and Amlodipine/ benazapril at home.  no chest pain/ sob.  check ecg  will start iv beta blocker/ Vasotec for bp control, if pt is npo, otherwise continue all bp meds as before.  dvt prophylaxis  no need for any cardiac testing  dvt prophylaxis
ruq pain  abd pain  pancreatitis    plan  in and out  ppi once a day  may need mrcp to be done  check imaging us/ct scan  in and out  ivf aggressively  ppi once a day  serial lft and amylaee and lipase and r/o other causes of pancreatitis autoimmune  surgical consultation  may need ercp pending results of mrcp    
55 year old male with PMH chronic back pain, DM, seizure disorder on lamotrigine presents with abdominal pain x 2 weeks with pancreatitis, left hip bursa collection     Mukul Daigle  Attending Physician   Division of Infectious Disease  Pager #[phone]  Available on Microsoft Teams also  After 5pm/weekend or no response, call #[phone]

## 2021-08-29 NOTE — CONSULT NOTE ADULT - NEGATIVE GENERAL SYMPTOMS
Detail Level: None
Venipuncture Paragraph: An alcohol pad was applied to the venipuncture site. Venipuncture was performed using a straight needle. Pressure and a bandaid was applied to the site. No complications were noted.
Number Of Tubes Drawn: 1
no fever
Bill For Individual Tests Below?: no

## 2021-08-29 NOTE — CONSULT NOTE ADULT - PROBLEM SELECTOR RECOMMENDATION 3
-h/o fall on left hip  -no fever or leukocytosis   -no indication for abx  -outpt followup with ortho

## 2021-08-30 DIAGNOSIS — Z71.89 OTHER SPECIFIED COUNSELING: ICD-10-CM

## 2021-08-30 LAB
ALBUMIN SERPL ELPH-MCNC: 4.1 G/DL — SIGNIFICANT CHANGE UP (ref 3.3–5)
ALP SERPL-CCNC: 77 U/L — SIGNIFICANT CHANGE UP (ref 40–120)
ALT FLD-CCNC: 12 U/L — SIGNIFICANT CHANGE UP (ref 10–45)
AST SERPL-CCNC: 13 U/L — SIGNIFICANT CHANGE UP (ref 10–40)
BILIRUB DIRECT SERPL-MCNC: 0.2 MG/DL — SIGNIFICANT CHANGE UP (ref 0–0.2)
BILIRUB INDIRECT FLD-MCNC: 0.4 MG/DL — SIGNIFICANT CHANGE UP (ref 0.2–1)
BILIRUB SERPL-MCNC: 0.6 MG/DL — SIGNIFICANT CHANGE UP (ref 0.2–1.2)
GLUCOSE BLDC GLUCOMTR-MCNC: 161 MG/DL — HIGH (ref 70–99)
GLUCOSE BLDC GLUCOMTR-MCNC: 169 MG/DL — HIGH (ref 70–99)
LIDOCAIN IGE QN: 93 U/L — HIGH (ref 7–60)
PROT SERPL-MCNC: 6.9 G/DL — SIGNIFICANT CHANGE UP (ref 6–8.3)
TSH SERPL-MCNC: 1.31 UIU/ML — SIGNIFICANT CHANGE UP (ref 0.27–4.2)

## 2021-08-30 PROCEDURE — 72158 MRI LUMBAR SPINE W/O & W/DYE: CPT | Mod: 26

## 2021-08-30 PROCEDURE — 99232 SBSQ HOSP IP/OBS MODERATE 35: CPT

## 2021-08-30 RX ORDER — ONDANSETRON 8 MG/1
4 TABLET, FILM COATED ORAL ONCE
Refills: 0 | Status: COMPLETED | OUTPATIENT
Start: 2021-08-30 | End: 2021-08-30

## 2021-08-30 RX ORDER — MORPHINE SULFATE 50 MG/1
2 CAPSULE, EXTENDED RELEASE ORAL ONCE
Refills: 0 | Status: DISCONTINUED | OUTPATIENT
Start: 2021-08-30 | End: 2021-08-30

## 2021-08-30 RX ADMIN — LAMOTRIGINE 225 MILLIGRAM(S): 25 TABLET, ORALLY DISINTEGRATING ORAL at 18:44

## 2021-08-30 RX ADMIN — LAMOTRIGINE 225 MILLIGRAM(S): 25 TABLET, ORALLY DISINTEGRATING ORAL at 06:39

## 2021-08-30 RX ADMIN — PANTOPRAZOLE SODIUM 40 MILLIGRAM(S): 20 TABLET, DELAYED RELEASE ORAL at 06:39

## 2021-08-30 RX ADMIN — ATENOLOL 50 MILLIGRAM(S): 25 TABLET ORAL at 06:39

## 2021-08-30 RX ADMIN — Medication 1: at 12:53

## 2021-08-30 RX ADMIN — AMLODIPINE BESYLATE 5 MILLIGRAM(S): 2.5 TABLET ORAL at 06:39

## 2021-08-30 RX ADMIN — LOSARTAN POTASSIUM 50 MILLIGRAM(S): 100 TABLET, FILM COATED ORAL at 06:39

## 2021-08-30 RX ADMIN — MORPHINE SULFATE 2 MILLIGRAM(S): 50 CAPSULE, EXTENDED RELEASE ORAL at 07:44

## 2021-08-30 RX ADMIN — TAMSULOSIN HYDROCHLORIDE 0.8 MILLIGRAM(S): 0.4 CAPSULE ORAL at 21:06

## 2021-08-30 RX ADMIN — HEPARIN SODIUM 5000 UNIT(S): 5000 INJECTION INTRAVENOUS; SUBCUTANEOUS at 18:44

## 2021-08-30 RX ADMIN — Medication 1: at 08:42

## 2021-08-30 RX ADMIN — MORPHINE SULFATE 2 MILLIGRAM(S): 50 CAPSULE, EXTENDED RELEASE ORAL at 08:15

## 2021-08-30 RX ADMIN — ATORVASTATIN CALCIUM 80 MILLIGRAM(S): 80 TABLET, FILM COATED ORAL at 21:06

## 2021-08-30 RX ADMIN — HEPARIN SODIUM 5000 UNIT(S): 5000 INJECTION INTRAVENOUS; SUBCUTANEOUS at 06:39

## 2021-08-30 RX ADMIN — ONDANSETRON 4 MILLIGRAM(S): 8 TABLET, FILM COATED ORAL at 12:52

## 2021-08-31 LAB
ALBUMIN SERPL ELPH-MCNC: 4.3 G/DL — SIGNIFICANT CHANGE UP (ref 3.3–5)
ALP SERPL-CCNC: 73 U/L — SIGNIFICANT CHANGE UP (ref 40–120)
ALT FLD-CCNC: 11 U/L — SIGNIFICANT CHANGE UP (ref 10–45)
ANA TITR SER: NEGATIVE — SIGNIFICANT CHANGE UP
ANION GAP SERPL CALC-SCNC: 13 MMOL/L — SIGNIFICANT CHANGE UP (ref 5–17)
AST SERPL-CCNC: 13 U/L — SIGNIFICANT CHANGE UP (ref 10–40)
BILIRUB SERPL-MCNC: 0.5 MG/DL — SIGNIFICANT CHANGE UP (ref 0.2–1.2)
BUN SERPL-MCNC: 10 MG/DL — SIGNIFICANT CHANGE UP (ref 7–23)
CALCIUM SERPL-MCNC: 9.9 MG/DL — SIGNIFICANT CHANGE UP (ref 8.4–10.5)
CHLORIDE SERPL-SCNC: 104 MMOL/L — SIGNIFICANT CHANGE UP (ref 96–108)
CO2 SERPL-SCNC: 25 MMOL/L — SIGNIFICANT CHANGE UP (ref 22–31)
COVID-19 SPIKE DOMAIN AB INTERP: POSITIVE
COVID-19 SPIKE DOMAIN ANTIBODY RESULT: >250 U/ML — HIGH
CREAT SERPL-MCNC: 0.93 MG/DL — SIGNIFICANT CHANGE UP (ref 0.5–1.3)
GLUCOSE BLDC GLUCOMTR-MCNC: 144 MG/DL — HIGH (ref 70–99)
GLUCOSE BLDC GLUCOMTR-MCNC: 146 MG/DL — HIGH (ref 70–99)
GLUCOSE BLDC GLUCOMTR-MCNC: 168 MG/DL — HIGH (ref 70–99)
GLUCOSE SERPL-MCNC: 116 MG/DL — HIGH (ref 70–99)
LIDOCAIN IGE QN: 56 U/L — SIGNIFICANT CHANGE UP (ref 7–60)
POTASSIUM SERPL-MCNC: 4 MMOL/L — SIGNIFICANT CHANGE UP (ref 3.5–5.3)
POTASSIUM SERPL-SCNC: 4 MMOL/L — SIGNIFICANT CHANGE UP (ref 3.5–5.3)
PROT SERPL-MCNC: 7.1 G/DL — SIGNIFICANT CHANGE UP (ref 6–8.3)
SARS-COV-2 IGG+IGM SERPL QL IA: >250 U/ML — HIGH
SARS-COV-2 IGG+IGM SERPL QL IA: POSITIVE
SODIUM SERPL-SCNC: 142 MMOL/L — SIGNIFICANT CHANGE UP (ref 135–145)

## 2021-08-31 PROCEDURE — 74183 MRI ABD W/O CNTR FLWD CNTR: CPT | Mod: 26

## 2021-08-31 PROCEDURE — 93010 ELECTROCARDIOGRAM REPORT: CPT

## 2021-08-31 PROCEDURE — 72197 MRI PELVIS W/O & W/DYE: CPT | Mod: 26

## 2021-08-31 RX ORDER — ONDANSETRON 8 MG/1
4 TABLET, FILM COATED ORAL ONCE
Refills: 0 | Status: COMPLETED | OUTPATIENT
Start: 2021-08-31 | End: 2021-08-31

## 2021-08-31 RX ORDER — OXYCODONE AND ACETAMINOPHEN 5; 325 MG/1; MG/1
1 TABLET ORAL EVERY 6 HOURS
Refills: 0 | Status: DISCONTINUED | OUTPATIENT
Start: 2021-08-31 | End: 2021-09-01

## 2021-08-31 RX ORDER — MORPHINE SULFATE 50 MG/1
4 CAPSULE, EXTENDED RELEASE ORAL ONCE
Refills: 0 | Status: DISCONTINUED | OUTPATIENT
Start: 2021-08-31 | End: 2021-08-31

## 2021-08-31 RX ORDER — MORPHINE SULFATE 50 MG/1
2 CAPSULE, EXTENDED RELEASE ORAL ONCE
Refills: 0 | Status: DISCONTINUED | OUTPATIENT
Start: 2021-08-31 | End: 2021-08-31

## 2021-08-31 RX ORDER — ACETAMINOPHEN 500 MG
1000 TABLET ORAL ONCE
Refills: 0 | Status: DISCONTINUED | OUTPATIENT
Start: 2021-08-31 | End: 2021-08-31

## 2021-08-31 RX ORDER — MORPHINE SULFATE 50 MG/1
2 CAPSULE, EXTENDED RELEASE ORAL EVERY 8 HOURS
Refills: 0 | Status: DISCONTINUED | OUTPATIENT
Start: 2021-08-31 | End: 2021-09-01

## 2021-08-31 RX ADMIN — HEPARIN SODIUM 5000 UNIT(S): 5000 INJECTION INTRAVENOUS; SUBCUTANEOUS at 06:39

## 2021-08-31 RX ADMIN — MORPHINE SULFATE 4 MILLIGRAM(S): 50 CAPSULE, EXTENDED RELEASE ORAL at 17:15

## 2021-08-31 RX ADMIN — LOSARTAN POTASSIUM 50 MILLIGRAM(S): 100 TABLET, FILM COATED ORAL at 05:05

## 2021-08-31 RX ADMIN — MORPHINE SULFATE 2 MILLIGRAM(S): 50 CAPSULE, EXTENDED RELEASE ORAL at 00:37

## 2021-08-31 RX ADMIN — ONDANSETRON 4 MILLIGRAM(S): 8 TABLET, FILM COATED ORAL at 03:23

## 2021-08-31 RX ADMIN — MORPHINE SULFATE 2 MILLIGRAM(S): 50 CAPSULE, EXTENDED RELEASE ORAL at 02:07

## 2021-08-31 RX ADMIN — PANTOPRAZOLE SODIUM 40 MILLIGRAM(S): 20 TABLET, DELAYED RELEASE ORAL at 05:06

## 2021-08-31 RX ADMIN — LAMOTRIGINE 225 MILLIGRAM(S): 25 TABLET, ORALLY DISINTEGRATING ORAL at 05:05

## 2021-08-31 RX ADMIN — OXYCODONE AND ACETAMINOPHEN 1 TABLET(S): 5; 325 TABLET ORAL at 05:04

## 2021-08-31 RX ADMIN — ATENOLOL 50 MILLIGRAM(S): 25 TABLET ORAL at 06:39

## 2021-08-31 RX ADMIN — ONDANSETRON 4 MILLIGRAM(S): 8 TABLET, FILM COATED ORAL at 11:16

## 2021-08-31 RX ADMIN — OXYCODONE AND ACETAMINOPHEN 1 TABLET(S): 5; 325 TABLET ORAL at 11:16

## 2021-08-31 RX ADMIN — TAMSULOSIN HYDROCHLORIDE 0.8 MILLIGRAM(S): 0.4 CAPSULE ORAL at 21:45

## 2021-08-31 RX ADMIN — Medication 1: at 13:14

## 2021-08-31 RX ADMIN — MORPHINE SULFATE 4 MILLIGRAM(S): 50 CAPSULE, EXTENDED RELEASE ORAL at 16:45

## 2021-08-31 RX ADMIN — HEPARIN SODIUM 5000 UNIT(S): 5000 INJECTION INTRAVENOUS; SUBCUTANEOUS at 16:47

## 2021-08-31 RX ADMIN — AMLODIPINE BESYLATE 5 MILLIGRAM(S): 2.5 TABLET ORAL at 05:06

## 2021-08-31 RX ADMIN — OXYCODONE AND ACETAMINOPHEN 1 TABLET(S): 5; 325 TABLET ORAL at 11:23

## 2021-08-31 RX ADMIN — LAMOTRIGINE 225 MILLIGRAM(S): 25 TABLET, ORALLY DISINTEGRATING ORAL at 16:44

## 2021-08-31 RX ADMIN — ATORVASTATIN CALCIUM 80 MILLIGRAM(S): 80 TABLET, FILM COATED ORAL at 21:45

## 2021-09-01 ENCOUNTER — TRANSCRIPTION ENCOUNTER (OUTPATIENT)
Age: 56
End: 2021-09-01

## 2021-09-01 VITALS
SYSTOLIC BLOOD PRESSURE: 136 MMHG | HEART RATE: 83 BPM | OXYGEN SATURATION: 94 % | RESPIRATION RATE: 18 BRPM | DIASTOLIC BLOOD PRESSURE: 98 MMHG | TEMPERATURE: 99 F

## 2021-09-01 LAB
GLUCOSE BLDC GLUCOMTR-MCNC: 117 MG/DL — HIGH (ref 70–99)
GLUCOSE BLDC GLUCOMTR-MCNC: 139 MG/DL — HIGH (ref 70–99)
GLUCOSE BLDC GLUCOMTR-MCNC: 158 MG/DL — HIGH (ref 70–99)

## 2021-09-01 PROCEDURE — 86769 SARS-COV-2 COVID-19 ANTIBODY: CPT

## 2021-09-01 PROCEDURE — 87086 URINE CULTURE/COLONY COUNT: CPT

## 2021-09-01 PROCEDURE — 96374 THER/PROPH/DIAG INJ IV PUSH: CPT | Mod: XU

## 2021-09-01 PROCEDURE — 81001 URINALYSIS AUTO W/SCOPE: CPT

## 2021-09-01 PROCEDURE — 85014 HEMATOCRIT: CPT

## 2021-09-01 PROCEDURE — 83036 HEMOGLOBIN GLYCOSYLATED A1C: CPT

## 2021-09-01 PROCEDURE — 85027 COMPLETE CBC AUTOMATED: CPT

## 2021-09-01 PROCEDURE — 87635 SARS-COV-2 COVID-19 AMP PRB: CPT

## 2021-09-01 PROCEDURE — 86803 HEPATITIS C AB TEST: CPT

## 2021-09-01 PROCEDURE — 85018 HEMOGLOBIN: CPT

## 2021-09-01 PROCEDURE — 82962 GLUCOSE BLOOD TEST: CPT

## 2021-09-01 PROCEDURE — 83690 ASSAY OF LIPASE: CPT

## 2021-09-01 PROCEDURE — 82947 ASSAY GLUCOSE BLOOD QUANT: CPT

## 2021-09-01 PROCEDURE — 84443 ASSAY THYROID STIM HORMONE: CPT

## 2021-09-01 PROCEDURE — 80053 COMPREHEN METABOLIC PANEL: CPT

## 2021-09-01 PROCEDURE — 82150 ASSAY OF AMYLASE: CPT

## 2021-09-01 PROCEDURE — 82330 ASSAY OF CALCIUM: CPT

## 2021-09-01 PROCEDURE — 82803 BLOOD GASES ANY COMBINATION: CPT

## 2021-09-01 PROCEDURE — 83605 ASSAY OF LACTIC ACID: CPT

## 2021-09-01 PROCEDURE — 85652 RBC SED RATE AUTOMATED: CPT

## 2021-09-01 PROCEDURE — 93005 ELECTROCARDIOGRAM TRACING: CPT

## 2021-09-01 PROCEDURE — 86038 ANTINUCLEAR ANTIBODIES: CPT

## 2021-09-01 PROCEDURE — 84478 ASSAY OF TRIGLYCERIDES: CPT

## 2021-09-01 PROCEDURE — 84295 ASSAY OF SERUM SODIUM: CPT

## 2021-09-01 PROCEDURE — 96375 TX/PRO/DX INJ NEW DRUG ADDON: CPT

## 2021-09-01 PROCEDURE — 74177 CT ABD & PELVIS W/CONTRAST: CPT | Mod: MA

## 2021-09-01 PROCEDURE — 82435 ASSAY OF BLOOD CHLORIDE: CPT

## 2021-09-01 PROCEDURE — A9585: CPT

## 2021-09-01 PROCEDURE — 84132 ASSAY OF SERUM POTASSIUM: CPT

## 2021-09-01 PROCEDURE — 72197 MRI PELVIS W/O & W/DYE: CPT

## 2021-09-01 PROCEDURE — 74183 MRI ABD W/O CNTR FLWD CNTR: CPT

## 2021-09-01 PROCEDURE — 99285 EMERGENCY DEPT VISIT HI MDM: CPT | Mod: 25

## 2021-09-01 PROCEDURE — 72158 MRI LUMBAR SPINE W/O & W/DYE: CPT

## 2021-09-01 PROCEDURE — 80076 HEPATIC FUNCTION PANEL: CPT

## 2021-09-01 PROCEDURE — 85025 COMPLETE CBC W/AUTO DIFF WBC: CPT

## 2021-09-01 PROCEDURE — 76705 ECHO EXAM OF ABDOMEN: CPT

## 2021-09-01 RX ORDER — AMLODIPINE BESYLATE AND BENAZEPRIL HYDROCHLORIDE 10; 20 MG/1; MG/1
1 CAPSULE ORAL
Qty: 0 | Refills: 0 | DISCHARGE

## 2021-09-01 RX ORDER — AMLODIPINE BESYLATE 2.5 MG/1
1 TABLET ORAL
Qty: 30 | Refills: 0
Start: 2021-09-01 | End: 2021-09-30

## 2021-09-01 RX ORDER — LOSARTAN POTASSIUM 100 MG/1
1 TABLET, FILM COATED ORAL
Qty: 30 | Refills: 0
Start: 2021-09-01 | End: 2021-09-30

## 2021-09-01 RX ORDER — ASPIRIN/CALCIUM CARB/MAGNESIUM 324 MG
1 TABLET ORAL
Qty: 0 | Refills: 0 | DISCHARGE

## 2021-09-01 RX ADMIN — OXYCODONE AND ACETAMINOPHEN 1 TABLET(S): 5; 325 TABLET ORAL at 16:21

## 2021-09-01 RX ADMIN — Medication 1: at 10:11

## 2021-09-01 RX ADMIN — LOSARTAN POTASSIUM 50 MILLIGRAM(S): 100 TABLET, FILM COATED ORAL at 05:15

## 2021-09-01 RX ADMIN — HEPARIN SODIUM 5000 UNIT(S): 5000 INJECTION INTRAVENOUS; SUBCUTANEOUS at 05:14

## 2021-09-01 RX ADMIN — AMLODIPINE BESYLATE 5 MILLIGRAM(S): 2.5 TABLET ORAL at 05:19

## 2021-09-01 RX ADMIN — OXYCODONE AND ACETAMINOPHEN 1 TABLET(S): 5; 325 TABLET ORAL at 08:58

## 2021-09-01 RX ADMIN — OXYCODONE AND ACETAMINOPHEN 1 TABLET(S): 5; 325 TABLET ORAL at 17:00

## 2021-09-01 RX ADMIN — LAMOTRIGINE 225 MILLIGRAM(S): 25 TABLET, ORALLY DISINTEGRATING ORAL at 05:15

## 2021-09-01 RX ADMIN — ATENOLOL 50 MILLIGRAM(S): 25 TABLET ORAL at 05:14

## 2021-09-01 RX ADMIN — LAMOTRIGINE 225 MILLIGRAM(S): 25 TABLET, ORALLY DISINTEGRATING ORAL at 18:48

## 2021-09-01 RX ADMIN — PANTOPRAZOLE SODIUM 40 MILLIGRAM(S): 20 TABLET, DELAYED RELEASE ORAL at 05:14

## 2021-09-01 NOTE — DISCHARGE NOTE NURSING/CASE MANAGEMENT/SOCIAL WORK - PATIENT PORTAL LINK FT
You can access the FollowMyHealth Patient Portal offered by [facility] by registering at the following website: [email or web address]. By joining [facility]’s FollowMyHealth portal, you will also be able to view your health information using other applications (apps) compatible with our system.

## 2021-09-01 NOTE — DISCHARGE NOTE PROVIDER - HOSPITAL COURSE
55 year old male with PMH chronic back pain,   lumbar  diskectomy on 7/2020,   DM, seizure disorder on lamotrigine   who presented  with  left sided back pain radiating to LLQ over the past 2 weeks associated with nausea and multiple episodes of nonbloody nonbilious emesis.  Abd pain. ha s resolved.  From  ?  acute pancreatitis. Pt with elevated lipase. CT A.p. normal pancreas.  GI MD Dr Saunders.  Seizure  disorder: Continue on meds.   Anxiety:  on prn  Clonazepam  DM,  follow fs  HTN on norvasc/  atenolol/ benzapril at home.  Pt has no back pain now/ prior  spinal surg  on 2020  Ct  with   bursal collections  of  hip    ?  infection.  MRI pelvis - pending -  per ID Dr Daigle - no antibiotic.   Lipase  has  dcereased/ still awiating mri abdomen  MRI   spine - no discitis/ ?  loosening of  hardware.  Pt is  afebrile, no back pain  and has no infection. Pt may  f/p with his own ortho  as  an out pt.  MRI  abdomen -   report pending. Pt dressed requesting to ,  wants to pacheco landrum now,  states  he has   hard time  sleeping in hospital  he  wishes  to  f/p  with gi for  mri report   55 year old male with PMH chronic back pain,   lumbar  diskectomy on 7/2020,   DM, seizure disorder on lamotrigine   who presented  with  left sided back pain radiating to LLQ over the past 2 weeks associated with nausea and multiple episodes of nonbloody nonbilious emesis.  Abd pain. ha s resolved.  From  ?  acute pancreatitis. Pt with elevated lipase. CT A.p. normal pancreas.  GI MD Dr Saunders.  Seizure  disorder: Continue on meds.   Anxiety:  on prn  Clonazepam  DM,  follow fs  HTN on norvasc/  atenolol/ benzapril at home.  Pt has no back pain now/ prior  spinal surg  on 2020  Ct  with   bursal collections  of  hip    ?  infection.  MRI pelvis - pending -  per ID Dr Daigle - no antibiotic.   Lipase  has  dcereased/ still awiating mri abdomen  MRI   spine - no discitis/ ?  loosening of  hardware.  Pt is  afebrile, no back pain  and has no infection. Pt may  f/p with his own ortho  as  an out pt.  MRI  abdomen -   reported - concerning for Cyst in Lt Femoral area. No intervention per Orthopedic.   Pt medically cleared for discharge home per Med Attending Dr Ramachandran.

## 2021-09-01 NOTE — CHART NOTE - NSCHARTNOTEFT_GEN_A_CORE
MRI Report discussed with  Med Attending Dr Ramachandran who called Orthopedic  Resident to evaluate pt.   Discussed with Ortho Resident. No intervention recommended,

## 2021-09-01 NOTE — CHART NOTE - NSCHARTNOTEFT_GEN_A_CORE
Nutrition Note: Diet education prior to discharge today per pt request     Chart reviewed, events noted.     Diet: Low Fat diet     Provided diet education on Low Fat Nutrition Therapy. Written materials provided. Patient with no addition nutrition-related questions at this time. Made aware RD remains available as needed.     RD to remain available and follow-up as medically appropriate.  Marlene Turcios RD, CDN, Pager # [phone]

## 2021-09-01 NOTE — DISCHARGE NOTE NURSING/CASE MANAGEMENT/SOCIAL WORK - NSDCPEFALRISK_GEN_ALL_CORE
For information on Fall & injury Prevention, visit [email or web address]

## 2021-09-01 NOTE — DISCHARGE NOTE PROVIDER - CARE PROVIDER_API CALL
Zachary Saunders)  Gastroenterology  [address] [place] [address]  Phone: [phone]  Fax: [phone]  Follow Up Time: 1-3 days   Zachary Saunders)  Gastroenterology  [address] [place] [address]  Phone: [phone]  Fax: [phone]  Follow Up Time: 1-3 days    Santo Boland)  Clinical Neurophysiology; EEGEpilepsy; Neurology  [address]  [place] [address]  Phone: [phone]  Fax: [phone]  Established Patient  Follow Up Time: 1 week    Homero Cortes)  Neurosurgery  [address]  [place] [address]  Phone: [phone]  Fax: [phone]  Follow Up Time: 1 week    Kelley Zacarias  INTERNAL MEDICINE  [address] [place] [address]  Phone: [phone]  Fax: [phone]  Established Patient  Follow Up Time: 1 week

## 2021-09-01 NOTE — CHART NOTE - NSCHARTNOTEFT_GEN_A_CORE
Called to evaluate patient by medicine attending Dr. Benavidez for bilateral hip paralabral cysts. Attempted to evaluate patient but patient was on phone with insurance and dressed to leave hospital. Refused evaluation and ignored orthopaedic team while he was on phone. Attempted to give information for follow up but was becoming agitated. Unable to provide exam and give recommendations.    Homero Adan, DO  PGY3, Orthopaedic Surgery Called to evaluate patient by medicine attending Dr. Benavidez for bilateral hip paralabral cysts. Attempted to evaluate patient but patient was on phone with insurance and dressed to leave hospital. Refused evaluation and ignored orthopaedic team while he was on phone. Attempted to give information for follow up but was becoming agitated. Unable to perform exam and unable to give any recommendations.    Homero Adan, DO  PGY3, Orthopaedic Surgery Called to evaluate patient by medicine attending Dr. Benavidez for bilateral hip paralabral cysts. Attempted to evaluate patient but patient was on phone with insurance and dressed to leave hospital. Refused evaluation and ignored orthopaedic team while he was on phone, says "I'm leaving and I told them I didn't want any evaluations." Attempted to give information for follow up but was becoming agitated. Unable to perform exam and unable to give any recommendations.    Homero Adan, DO  PGY3, Orthopaedic Surgery

## 2021-09-01 NOTE — PROGRESS NOTE ADULT - REASON FOR ADMISSION
abd pain

## 2021-09-01 NOTE — DISCHARGE NOTE PROVIDER - PROVIDER TOKENS
PROVIDER:[TOKEN:[[id number]],FOLLOWUP:[1-3 days]] PROVIDER:[TOKEN:[[id number]],FOLLOWUP:[1-3 days]],PROVIDER:[TOKEN:[[id number]],FOLLOWUP:[1 week],ESTABLISHEDPATIENT:[T]],PROVIDER:[TOKEN:[[id number]],FOLLOWUP:[1 week]],PROVIDER:[TOKEN:[[id number]],FOLLOWUP:[1 week],ESTABLISHEDPATIENT:[T]]

## 2021-09-01 NOTE — PROVIDER CONTACT NOTE (OTHER) - ASSESSMENT
pt admitted for pancreatits. all vss, pt denies sob and chest pain. pt refused am meds and fluids ordered. pt educated on risks of medication refusal

## 2021-09-01 NOTE — DISCHARGE NOTE PROVIDER - CARE PROVIDERS DIRECT ADDRESSES
,[email or web address]

## 2021-09-01 NOTE — PROGRESS NOTE ADULT - SUBJECTIVE AND OBJECTIVE BOX
CARDIOLOGY     PROGRESS  NOTE   ________________________________________________    CHIEF COMPLAINT:Patient is a 55y old  Male who presents with a chief complaint of abd pain (29 Aug 2021 15:42)  no complain.  	  REVIEW OF SYSTEMS:  CONSTITUTIONAL: No fever, weight loss, or fatigue  EYES: No eye pain, visual disturbances, or discharge  ENT:  No difficulty hearing, tinnitus, vertigo; No sinus or throat pain  NECK: No pain or stiffness  RESPIRATORY: No cough, wheezing, chills or hemoptysis; No Shortness of Breath  CARDIOVASCULAR: No chest pain, palpitations, passing out, dizziness, or leg swelling  GASTROINTESTINAL: No abdominal or epigastric pain. No nausea, vomiting, or hematemesis; No diarrhea or constipation. No melena or hematochezia.  GENITOURINARY: No dysuria, frequency, hematuria, or incontinence  NEUROLOGICAL: No headaches, memory loss, loss of strength, numbness, or tremors  SKIN: No itching, burning, rashes, or lesions   LYMPH Nodes: No enlarged glands  ENDOCRINE: No heat or cold intolerance; No hair loss  MUSCULOSKELETAL: No joint pain or swelling; No muscle, back, or extremity pain  PSYCHIATRIC: No depression, anxiety, mood swings, or difficulty sleeping  HEME/LYMPH: No easy bruising, or bleeding gums  ALLERGY AND IMMUNOLOGIC: No hives or eczema	    [ ] All others negative	  [ ] Unable to obtain    PHYSICAL EXAM:  T(C): 36.8 (08-30-21 @ 04:46), Max: 37.1 (08-29-21 @ 10:18)  HR: 78 (08-30-21 @ 06:32) (64 - 78)  BP: 161/85 (08-30-21 @ 06:32) (132/70 - 161/85)  RR: 18 (08-30-21 @ 04:46) (18 - 18)  SpO2: 98% (08-30-21 @ 04:46) (96% - 98%)  Wt(kg): --  I&O's Summary    29 Aug 2021 07:01  -  30 Aug 2021 07:00  --------------------------------------------------------  IN: 2120 mL / OUT: 0 mL / NET: 2120 mL        Appearance: Normal	  HEENT:   Normal oral mucosa, PERRL, EOMI	  Lymphatic: No lymphadenopathy  Cardiovascular: Normal S1 S2, No JVD, + murmurs, No edema  Respiratory: Lungs clear to auscultation	  Psychiatry: A & O x 3, Mood & affect appropriate  Gastrointestinal:  Soft, Non-tender, + BS	  Skin: No rashes, No ecchymoses, No cyanosis	  Neurologic: Non-focal  Extremities: Normal range of motion, No clubbing, cyanosis or edema  Vascular: Peripheral pulses palpable 2+ bilaterally    MEDICATIONS  (STANDING):  amLODIPine   Tablet 5 milliGRAM(s) Oral daily  ATENolol  Tablet 50 milliGRAM(s) Oral daily  atorvastatin 80 milliGRAM(s) Oral at bedtime  dextrose 40% Gel 15 Gram(s) Oral once  dextrose 5%. 1000 milliLiter(s) (50 mL/Hr) IV Continuous <Continuous>  dextrose 5%. 1000 milliLiter(s) (100 mL/Hr) IV Continuous <Continuous>  dextrose 50% Injectable 25 Gram(s) IV Push once  dextrose 50% Injectable 12.5 Gram(s) IV Push once  dextrose 50% Injectable 25 Gram(s) IV Push once  glucagon  Injectable 1 milliGRAM(s) IntraMuscular once  heparin   Injectable 5000 Unit(s) SubCutaneous every 12 hours  insulin lispro (ADMELOG) corrective regimen sliding scale   SubCutaneous three times a day before meals  lactated ringers. 1000 milliLiter(s) (100 mL/Hr) IV Continuous <Continuous>  lamoTRIgine 225 milliGRAM(s) Oral two times a day  LORazepam     Tablet 1 milliGRAM(s) Oral once  losartan 50 milliGRAM(s) Oral daily  pantoprazole    Tablet 40 milliGRAM(s) Oral before breakfast  tamsulosin 0.8 milliGRAM(s) Oral at bedtime      TELEMETRY: 	    ECG:  	  RADIOLOGY:  OTHER: 	  	  LABS:	 	    CARDIAC MARKERS:                                13.8   8.05  )-----------( 194      ( 29 Aug 2021 08:03 )             40.5     08-29    137  |  102  |  13  ----------------------------<  147<H>  3.9   |  22  |  0.85    Ca    9.6      29 Aug 2021 08:03    TPro  7.0  /  Alb  4.1  /  TBili  0.6  /  DBili  x   /  AST  11  /  ALT  12  /  AlkPhos  81  08-29    proBNP:   Lipid Profile: Cholesterol --  LDL --  HDL --  TG 77  Cholesterol --  LDL --  HDL --      HgA1c:   TSH:   check imaging us/ct scan  in and out  ivf aggressively  ppi once a day  serial lft and amylaee and lipase and r/o other causes of pancreatitis autoimmune  surgical consultation  may need ercp pending results of mrcp      Assessment and plan  ---------------------------  55 year old male with PMH chronic back pain, ht, DM, seizure disorder on lamotrigine presents with abdominal pain x 2 weeks. Pt reports left sided back pain radiating to LLQ over the past 2 weeks associated with nausea and multiple episodes of nonbloody nonbilious emesis. Pt reports initial constipation and urge to defecate, but unable to. Pt states passing flatus, last BM this morning, "soft" per pt. Denies any fevers, chest pain, shortness of breath,  bloody stools, black tarry stools, dysuria, testicular pain, numbness, weakness, or rash. Denies any recent illness. Denies any recent injury or trauma. Denies any additional complaints  in er pt npo with increase bp, called to adjust bp meds iv as needed  pt on atenolol and Amlodipine/ benazapril at home.  no chest pain/ sob.  check ecg  will start iv beta blocker/ Vasotec for bp control, if pt is npo, otherwise continue all bp meds as before.  dvt prophylaxis  no need for any cardiac testing  dvt prophylaxis  start losartan 50 mg daily for better control will increase as neede  ?MRI    	        
           CARDIOLOGY     PROGRESS  NOTE   ________________________________________________    CHIEF COMPLAINT:Patient is a 55y old  Male who presents with a chief complaint of abd pain (30 Aug 2021 11:18)  no complain.  	  REVIEW OF SYSTEMS:  CONSTITUTIONAL: No fever, weight loss, or fatigue  EYES: No eye pain, visual disturbances, or discharge  ENT:  No difficulty hearing, tinnitus, vertigo; No sinus or throat pain  NECK: No pain or stiffness  RESPIRATORY: No cough, wheezing, chills or hemoptysis; No Shortness of Breath  CARDIOVASCULAR: No chest pain, palpitations, passing out, dizziness, or leg swelling  GASTROINTESTINAL: No abdominal or epigastric pain. No nausea, vomiting, or hematemesis; No diarrhea or constipation. No melena or hematochezia.  GENITOURINARY: No dysuria, frequency, hematuria, or incontinence  NEUROLOGICAL: No headaches, memory loss, loss of strength, numbness, or tremors  SKIN: No itching, burning, rashes, or lesions   LYMPH Nodes: No enlarged glands  ENDOCRINE: No heat or cold intolerance; No hair loss  MUSCULOSKELETAL: No joint pain or swelling; No muscle, back, or extremity pain  PSYCHIATRIC: No depression, anxiety, mood swings, or difficulty sleeping  HEME/LYMPH: No easy bruising, or bleeding gums  ALLERGY AND IMMUNOLOGIC: No hives or eczema	    [ ] All others negative	  [ ] Unable to obtain    PHYSICAL EXAM:  T(C): 37.1 (08-31-21 @ 05:07), Max: 37.1 (08-30-21 @ 09:00)  HR: 79 (08-31-21 @ 05:07) (72 - 86)  BP: 154/74 (08-31-21 @ 05:07) (149/75 - 167/76)  RR: 18 (08-31-21 @ 05:07) (17 - 18)  SpO2: 95% (08-31-21 @ 05:07) (95% - 99%)  Wt(kg): --  I&O's Summary    30 Aug 2021 07:01  -  31 Aug 2021 07:00  --------------------------------------------------------  IN: 760 mL / OUT: 0 mL / NET: 760 mL        Appearance: Normal	  HEENT:   Normal oral mucosa, PERRL, EOMI	  Lymphatic: No lymphadenopathy  Cardiovascular: Normal S1 S2, No JVD, No murmurs, No edema  Respiratory: Lungs clear to auscultation	  Psychiatry: A & O x 3, Mood & affect appropriate  Gastrointestinal:  Soft, Non-tender, + BS	  Skin: No rashes, No ecchymoses, No cyanosis	  Neurologic: Non-focal  Extremities: Normal range of motion, No clubbing, cyanosis or edema  Vascular: Peripheral pulses palpable 2+ bilaterally    MEDICATIONS  (STANDING):  acetaminophen  IVPB .. 1000 milliGRAM(s) IV Intermittent once  amLODIPine   Tablet 5 milliGRAM(s) Oral daily  ATENolol  Tablet 50 milliGRAM(s) Oral daily  atorvastatin 80 milliGRAM(s) Oral at bedtime  dextrose 40% Gel 15 Gram(s) Oral once  dextrose 5%. 1000 milliLiter(s) (50 mL/Hr) IV Continuous <Continuous>  dextrose 5%. 1000 milliLiter(s) (100 mL/Hr) IV Continuous <Continuous>  dextrose 50% Injectable 25 Gram(s) IV Push once  dextrose 50% Injectable 12.5 Gram(s) IV Push once  dextrose 50% Injectable 25 Gram(s) IV Push once  glucagon  Injectable 1 milliGRAM(s) IntraMuscular once  heparin   Injectable 5000 Unit(s) SubCutaneous every 12 hours  insulin lispro (ADMELOG) corrective regimen sliding scale   SubCutaneous three times a day before meals  lactated ringers. 1000 milliLiter(s) (100 mL/Hr) IV Continuous <Continuous>  lamoTRIgine 225 milliGRAM(s) Oral two times a day  LORazepam     Tablet 1 milliGRAM(s) Oral once  losartan 50 milliGRAM(s) Oral daily  pantoprazole    Tablet 40 milliGRAM(s) Oral before breakfast  tamsulosin 0.8 milliGRAM(s) Oral at bedtime      TELEMETRY: 	    ECG:  	  RADIOLOGY:  OTHER: 	  	  LABS:	 	    CARDIAC MARKERS:                                13.8   8.05  )-----------( 194      ( 29 Aug 2021 08:03 )             40.5     08-29    137  |  102  |  13  ----------------------------<  147<H>  3.9   |  22  |  0.85    Ca    9.6      29 Aug 2021 08:03    TPro  6.9  /  Alb  4.1  /  TBili  0.6  /  DBili  0.2  /  AST  13  /  ALT  12  /  AlkPhos  77  08-30    proBNP:   Lipid Profile: Cholesterol --  LDL --  HDL --  TG 77  Cholesterol --  LDL --  HDL --      HgA1c:   TSH: Thyroid Stimulating Hormone, Serum: 1.31 uIU/mL (08-29 @ 09:29)    check imaging us/ct scan  in and out  ivf aggressively  ppi once a day  serial lft and amylaee and lipase and r/o other causes of pancreatitis autoimmune  surgical consultation  may need ercp pending results of mrcp  mrcp later today  will follow   < from: 12 Lead ECG (12.26.19 @ 19:43) >  Diagnosis Line NORMAL SINUS RHYTHM  NORMAL ECG          Assessment and plan  ---------------------------  55 year old male with PMH chronic back pain, ht, DM, seizure disorder on lamotrigine presents with abdominal pain x 2 weeks. Pt reports left sided back pain radiating to LLQ over the past 2 weeks associated with nausea and multiple episodes of nonbloody nonbilious emesis. Pt reports initial constipation and urge to defecate, but unable to. Pt states passing flatus, last BM this morning, "soft" per pt. Denies any fevers, chest pain, shortness of breath,  bloody stools, black tarry stools, dysuria, testicular pain, numbness, weakness, or rash. Denies any recent illness. Denies any recent injury or trauma. Denies any additional complaints  in er pt npo with increase bp, called to adjust bp meds iv as needed  pt on atenolol and Amlodipine/ benazapril at home.  no chest pain/ sob.  check ecg  will start iv beta blocker/ Vasotec for bp control, if pt is npo, otherwise continue all bp meds as before.  tsh noted  dvt prophylaxis  no need for any cardiac testing  dvt prophylaxis  start losartan 50 mg daily for better control will increase as needed  awaiting MRCP  echo   increase ambulation      	        
 afebrile, no abd pain    REVIEW OF SYSTEMS:  GEN: no fever,    no chills  RESP: no SOB,   no cough  CVS: no chest pain,   no palpitations  GI: no abdominal pain,   no nausea,   no vomiting,   no constipation,   no diarrhea  : no dysuria,   no frequency  NEURO: no headache,   no dizziness  PSYCH: no depression,   not anxious  Derm : no rash    MEDICATIONS  (STANDING):  amLODIPine   Tablet 5 milliGRAM(s) Oral daily  ATENolol  Tablet 50 milliGRAM(s) Oral daily  atorvastatin 80 milliGRAM(s) Oral at bedtime  dextrose 40% Gel 15 Gram(s) Oral once  dextrose 5%. 1000 milliLiter(s) (50 mL/Hr) IV Continuous <Continuous>  dextrose 5%. 1000 milliLiter(s) (100 mL/Hr) IV Continuous <Continuous>  dextrose 50% Injectable 25 Gram(s) IV Push once  dextrose 50% Injectable 12.5 Gram(s) IV Push once  dextrose 50% Injectable 25 Gram(s) IV Push once  glucagon  Injectable 1 milliGRAM(s) IntraMuscular once  heparin   Injectable 5000 Unit(s) SubCutaneous every 12 hours  insulin lispro (ADMELOG) corrective regimen sliding scale   SubCutaneous three times a day before meals  lamoTRIgine 225 milliGRAM(s) Oral two times a day  losartan 50 milliGRAM(s) Oral daily  pantoprazole    Tablet 40 milliGRAM(s) Oral before breakfast  tamsulosin 0.8 milliGRAM(s) Oral at bedtime    MEDICATIONS  (PRN):  clonazePAM  Tablet 2 milliGRAM(s) Oral two times a day PRN anxiety  morphine  - Injectable 2 milliGRAM(s) IV Push every 8 hours PRN Severe Pain (7 - 10)  oxycodone    5 mG/acetaminophen 325 mG 1 Tablet(s) Oral every 6 hours PRN Severe Pain (7 - 10)      Vital Signs Last 24 Hrs  T(C): 36.8 (01 Sep 2021 05:42), Max: 37.1 (31 Aug 2021 21:00)  T(F): 98.2 (01 Sep 2021 05:42), Max: 98.7 (31 Aug 2021 21:00)  HR: 84 (01 Sep 2021 05:42) (67 - 84)  BP: 143/79 (01 Sep 2021 05:42) (128/80 - 164/86)  BP(mean): --  RR: 18 (01 Sep 2021 05:42) (18 - 18)  SpO2: 98% (01 Sep 2021 05:42) (96% - 98%)  CAPILLARY BLOOD GLUCOSE      POCT Blood Glucose.: 146 mg/dL (31 Aug 2021 20:59)  POCT Blood Glucose.: 168 mg/dL (31 Aug 2021 13:04)  POCT Blood Glucose.: 144 mg/dL (31 Aug 2021 09:11)    I&O's Summary    31 Aug 2021 07:01  -  01 Sep 2021 07:00  --------------------------------------------------------  IN: 1940 mL / OUT: 0 mL / NET: 1940 mL        PHYSICAL EXAM:  HEAD:  Atraumatic, Normocephalic  NECK: Supple, No   JVD  CHEST/LUNG:   no     rales,     no,    rhonchi  HEART: Regular rate and rhythm;         murmur  ABDOMEN: Soft, Nontender, ;   EXTREMITIES:  no      edema  NEUROLOGY:  alert    LABS:    08-31    142  |  104  |  10  ----------------------------<  116<H>  4.0   |  25  |  0.93    Ca    9.9      31 Aug 2021 07:23    TPro  7.1  /  Alb  4.3  /  TBili  0.5  /  DBili  x   /  AST  13  /  ALT  11  /  AlkPhos  73  08-31                    Thyroid Stimulating Hormone, Serum: 1.31 uIU/mL (08-29 @ 09:29)          Consultant(s) Notes Reviewed:      Care Discussed with Consultants/Other Providers:    
Medicine Progress Note    Patient is a 55y old  Male who presents with a chief complaint of abd pain (29 Aug 2021 08:48)  still with abdominal pain, meds helping. tolerating diet.      ADDITIONAL REVIEW OF SYSTEMS:  General:  No wt loss, fevers, chills, night sweats, fatigue,   Eyes:  Good vision, no reported pain  ENT:  No sore throat, pain, runny nose, dysphagia  CV:  No pain, palpitations, hypo/hypertension  Resp:  No dyspnea, cough, tachypnea, wheezing  GI:  + pain, No nausea, No vomiting, No diarrhea, No constipation, No weight loss, No fever, No pruritis, No rectal bleeding, No tarry stools, No dysphagia,  :  No pain, bleeding, incontinence, nocturia  Muscle:  No pain, weakness  Neuro:  No weakness, tingling, memory problems  Psych:  No fatigue, insomnia, mood problems, depression  Endocrine:  No polyuria, polydipsia, cold/heat intolerance  Heme:  No petechiae, ecchymosis, easy bruisability  Skin:  No rash, tattoos, scars, edema      MEDICATIONS  (STANDING):  amLODIPine   Tablet 5 milliGRAM(s) Oral daily  ATENolol  Tablet 50 milliGRAM(s) Oral daily  atorvastatin 80 milliGRAM(s) Oral at bedtime  dextrose 40% Gel 15 Gram(s) Oral once  dextrose 5%. 1000 milliLiter(s) (50 mL/Hr) IV Continuous <Continuous>  dextrose 5%. 1000 milliLiter(s) (100 mL/Hr) IV Continuous <Continuous>  dextrose 50% Injectable 25 Gram(s) IV Push once  dextrose 50% Injectable 12.5 Gram(s) IV Push once  dextrose 50% Injectable 25 Gram(s) IV Push once  glucagon  Injectable 1 milliGRAM(s) IntraMuscular once  heparin   Injectable 5000 Unit(s) SubCutaneous every 12 hours  insulin lispro (ADMELOG) corrective regimen sliding scale   SubCutaneous three times a day before meals  lactated ringers. 1000 milliLiter(s) (100 mL/Hr) IV Continuous <Continuous>  lamoTRIgine 225 milliGRAM(s) Oral two times a day  LORazepam     Tablet 1 milliGRAM(s) Oral once  losartan 50 milliGRAM(s) Oral daily  pantoprazole    Tablet 40 milliGRAM(s) Oral before breakfast  tamsulosin 0.8 milliGRAM(s) Oral at bedtime    MEDICATIONS  (PRN):  clonazePAM  Tablet 2 milliGRAM(s) Oral two times a day PRN anxiety    CAPILLARY BLOOD GLUCOSE      POCT Blood Glucose.: 257 mg/dL (29 Aug 2021 12:34)  POCT Blood Glucose.: 124 mg/dL (29 Aug 2021 09:03)  POCT Blood Glucose.: 132 mg/dL (29 Aug 2021 01:14)    I&O's Summary    28 Aug 2021 07:01  -  29 Aug 2021 07:00  --------------------------------------------------------  IN: 1440 mL / OUT: 0 mL / NET: 1440 mL    29 Aug 2021 07:01  -  29 Aug 2021 15:44  --------------------------------------------------------  IN: 640 mL / OUT: 0 mL / NET: 640 mL        PHYSICAL EXAM:  Vital Signs Last 24 Hrs  T(C): 37 (29 Aug 2021 13:32), Max: 37.1 (29 Aug 2021 10:18)  T(F): 98.6 (29 Aug 2021 13:32), Max: 98.7 (29 Aug 2021 10:18)  HR: 71 (29 Aug 2021 13:32) (70 - 98)  BP: 153/81 (29 Aug 2021 13:32) (133/73 - 162/90)  BP(mean): --  RR: 18 (29 Aug 2021 13:32) (18 - 18)  SpO2: 98% (29 Aug 2021 13:32) (96% - 99%)  CONSTITUTIONAL: NAD, well-developed, well-groomed  ENMT: Moist oral mucosa, no pharyngeal injection or exudates; normal dentition  RESPIRATORY: Normal respiratory effort; lungs are clear to auscultation bilaterally  CARDIOVASCULAR: Regular rate and rhythm, normal S1 and S2, no murmur/rub/gallop; No lower extremity edema; Peripheral pulses are 2+ bilaterally  ABDOMEN: Nontender to palpation, normoactive bowel sounds, no rebound/guarding; No hepatosplenomegaly  PSYCH: A+O to person, place, and time; affect appropriate  NEUROLOGY: CN 2-12 are intact and symmetric; no gross sensory deficits   SKIN: No rashes; no palpable lesions    LABS:                        13.8   8.05  )-----------( 194      ( 29 Aug 2021 08:03 )             40.5     08-29    137  |  102  |  13  ----------------------------<  147<H>  3.9   |  22  |  0.85    Ca    9.6      29 Aug 2021 08:03    TPro  7.0  /  Alb  4.1  /  TBili  0.6  /  DBili  x   /  AST  11  /  ALT  12  /  AlkPhos  81  08-29          Urinalysis Basic - ( 28 Aug 2021 07:27 )    Color: Light Yellow / Appearance: Clear / SG: >1.050 / pH: x  Gluc: x / Ketone: Negative  / Bili: Negative / Urobili: Negative   Blood: x / Protein: Trace / Nitrite: Negative   Leuk Esterase: Negative / RBC: 1 /hpf / WBC 0 /HPF   Sq Epi: x / Non Sq Epi: 0 /hpf / Bacteria: Negative        Culture - Urine (collected 28 Aug 2021 10:16)  Source: Clean Catch Clean Catch (Midstream)  Final Report (29 Aug 2021 11:44):    No growth      COVID-19 PCR: NotDetec (28 Aug 2021 04:40)      RADIOLOGY & ADDITIONAL TESTS:  Imaging from Last 24 Hours:    Electrocardiogram/QTc Interval:    COORDINATION OF CARE:  Care Discussed with Consultants/Other Providers:  
           CARDIOLOGY     PROGRESS  NOTE   ________________________________________________    CHIEF COMPLAINT:Patient is a 55y old  Male who presents with a chief complaint of abd pain (29 Aug 2021 07:49)  no complain.  	  REVIEW OF SYSTEMS:  CONSTITUTIONAL: No fever, weight loss, or fatigue  EYES: No eye pain, visual disturbances, or discharge  ENT:  No difficulty hearing, tinnitus, vertigo; No sinus or throat pain  NECK: No pain or stiffness  RESPIRATORY: No cough, wheezing, chills or hemoptysis; No Shortness of Breath  CARDIOVASCULAR: No chest pain, palpitations, passing out, dizziness, or leg swelling  GASTROINTESTINAL: No abdominal or epigastric pain. No nausea, vomiting, or hematemesis; No diarrhea or constipation. No melena or hematochezia.  GENITOURINARY: No dysuria, frequency, hematuria, or incontinence  NEUROLOGICAL: No headaches, memory loss, loss of strength, numbness, or tremors  SKIN: No itching, burning, rashes, or lesions   LYMPH Nodes: No enlarged glands  ENDOCRINE: No heat or cold intolerance; No hair loss  MUSCULOSKELETAL: No joint pain or swelling; No muscle, back, or extremity pain  PSYCHIATRIC: No depression, anxiety, mood swings, or difficulty sleeping  HEME/LYMPH: No easy bruising, or bleeding gums  ALLERGY AND IMMUNOLOGIC: No hives or eczema	    [ ] All others negative	  [ ] Unable to obtain    PHYSICAL EXAM:  T(C): 36.9 (08-29-21 @ 05:41), Max: 37.1 (08-28-21 @ 09:35)  HR: 98 (08-29-21 @ 05:41) (76 - 98)  BP: 160/79 (08-29-21 @ 05:41) (145/80 - 166/80)  RR: 18 (08-29-21 @ 05:41) (18 - 18)  SpO2: 99% (08-29-21 @ 05:41) (97% - 99%)  Wt(kg): --  I&O's Summary    28 Aug 2021 07:01  -  29 Aug 2021 07:00  --------------------------------------------------------  IN: 1440 mL / OUT: 0 mL / NET: 1440 mL        Appearance: Normal	  HEENT:   Normal oral mucosa, PERRL, EOMI	  Lymphatic: No lymphadenopathy  Cardiovascular: Normal S1 S2, No JVD, + murmurs, No edema  Respiratory: Lungs clear to auscultation	  Psychiatry: A & O x 3, Mood & affect appropriate  Gastrointestinal:  Soft, Non-tender, + BS	  Skin: No rashes, No ecchymoses, No cyanosis	  Neurologic: Non-focal  Extremities: Normal range of motion, No clubbing, cyanosis or edema  Vascular: Peripheral pulses palpable 2+ bilaterally    MEDICATIONS  (STANDING):  amLODIPine   Tablet 5 milliGRAM(s) Oral daily  ATENolol  Tablet 50 milliGRAM(s) Oral daily  atorvastatin 80 milliGRAM(s) Oral at bedtime  dextrose 40% Gel 15 Gram(s) Oral once  dextrose 5%. 1000 milliLiter(s) (100 mL/Hr) IV Continuous <Continuous>  dextrose 5%. 1000 milliLiter(s) (50 mL/Hr) IV Continuous <Continuous>  dextrose 50% Injectable 25 Gram(s) IV Push once  dextrose 50% Injectable 12.5 Gram(s) IV Push once  dextrose 50% Injectable 25 Gram(s) IV Push once  glucagon  Injectable 1 milliGRAM(s) IntraMuscular once  heparin   Injectable 5000 Unit(s) SubCutaneous every 12 hours  insulin lispro (ADMELOG) corrective regimen sliding scale   SubCutaneous three times a day before meals  lactated ringers. 1000 milliLiter(s) (100 mL/Hr) IV Continuous <Continuous>  lamoTRIgine 225 milliGRAM(s) Oral two times a day  LORazepam     Tablet 1 milliGRAM(s) Oral once  pantoprazole    Tablet 40 milliGRAM(s) Oral before breakfast  tamsulosin 0.8 milliGRAM(s) Oral at bedtime      TELEMETRY: 	    ECG:  	  RADIOLOGY:  OTHER: 	  	  LABS:	 	    CARDIAC MARKERS:                                13.8   8.05  )-----------( 194      ( 29 Aug 2021 08:03 )             40.5     08-29    137  |  102  |  13  ----------------------------<  147<H>  3.9   |  22  |  0.85    Ca    9.6      29 Aug 2021 08:03    TPro  7.0  /  Alb  4.1  /  TBili  0.6  /  DBili  x   /  AST  11  /  ALT  12  /  AlkPhos  81  08-29    proBNP:   Lipid Profile: Cholesterol --  LDL --  HDL --      HgA1c:   TSH:     < from: CT Abdomen and Pelvis w/ IV Cont (08.28.21 @ 06:02) >  No evidence of small bowel obstruction or active bowel inflammation.    There are 2 iliopsoas bursal collections on the right as well as a 3.8 x 2.2 cm bursal collection along the left anterior hip. Pelvic MRI can be obtained for further characterization.        Assessment and plan  ---------------------------  55 year old male with PMH chronic back pain, ht, DM, seizure disorder on lamotrigine presents with abdominal pain x 2 weeks. Pt reports left sided back pain radiating to LLQ over the past 2 weeks associated with nausea and multiple episodes of nonbloody nonbilious emesis. Pt reports initial constipation and urge to defecate, but unable to. Pt states passing flatus, last BM this morning, "soft" per pt. Denies any fevers, chest pain, shortness of breath,  bloody stools, black tarry stools, dysuria, testicular pain, numbness, weakness, or rash. Denies any recent illness. Denies any recent injury or trauma. Denies any additional complaints  in er pt npo with increase bp, called to adjust bp meds iv as needed  pt on atenolol and Amlodipine/ benazapril at home.  no chest pain/ sob.  check ecg  will start iv beta blocker/ Vasotec for bp control, if pt is npo, otherwise continue all bp meds as before.  dvt prophylaxis  no need for any cardiac testing  dvt prophylaxis  start losartan 50 mg daily for better control  ?MRI    	        
           CARDIOLOGY     PROGRESS  NOTE   ________________________________________________    CHIEF COMPLAINT:Patient is a 55y old  Male who presents with a chief complaint of abd pain (31 Aug 2021 10:38)  no comploain.  	  REVIEW OF SYSTEMS:  CONSTITUTIONAL: No fever, weight loss, or fatigue  EYES: No eye pain, visual disturbances, or discharge  ENT:  No difficulty hearing, tinnitus, vertigo; No sinus or throat pain  NECK: No pain or stiffness  RESPIRATORY: No cough, wheezing, chills or hemoptysis; No Shortness of Breath  CARDIOVASCULAR: No chest pain, palpitations, passing out, dizziness, or leg swelling  GASTROINTESTINAL: No abdominal or epigastric pain. No nausea, vomiting, or hematemesis; No diarrhea or constipation. No melena or hematochezia.  GENITOURINARY: No dysuria, frequency, hematuria, or incontinence  NEUROLOGICAL: No headaches, memory loss, loss of strength, numbness, or tremors  SKIN: No itching, burning, rashes, or lesions   LYMPH Nodes: No enlarged glands  ENDOCRINE: No heat or cold intolerance; No hair loss  MUSCULOSKELETAL: No joint pain or swelling; No muscle, back, or extremity pain  PSYCHIATRIC: No depression, anxiety, mood swings, or difficulty sleeping  HEME/LYMPH: No easy bruising, or bleeding gums  ALLERGY AND IMMUNOLOGIC: No hives or eczema	    [ ] All others negative	  [ ] Unable to obtain    PHYSICAL EXAM:  T(C): 36.8 (09-01-21 @ 05:42), Max: 37.1 (08-31-21 @ 21:00)  HR: 84 (09-01-21 @ 05:42) (67 - 86)  BP: 143/79 (09-01-21 @ 05:42) (128/80 - 164/86)  RR: 18 (09-01-21 @ 05:42) (18 - 18)  SpO2: 98% (09-01-21 @ 05:42) (96% - 98%)  Wt(kg): --  I&O's Summary    31 Aug 2021 07:01  -  01 Sep 2021 07:00  --------------------------------------------------------  IN: 1940 mL / OUT: 0 mL / NET: 1940 mL        Appearance: Normal	  HEENT:   Normal oral mucosa, PERRL, EOMI	  Lymphatic: No lymphadenopathy  Cardiovascular: Normal S1 S2, No JVD, No murmurs, No edema  Respiratory: Lungs clear to auscultation	  Psychiatry: A & O x 3, Mood & affect appropriate  Gastrointestinal:  Soft, + mild tender, + BS	  Skin: No rashes, No ecchymoses, No cyanosis	  Neurologic: Non-focal  Extremities: Normal range of motion, No clubbing, cyanosis or edema  Vascular: Peripheral pulses palpable 2+ bilaterally    MEDICATIONS  (STANDING):  amLODIPine   Tablet 5 milliGRAM(s) Oral daily  ATENolol  Tablet 50 milliGRAM(s) Oral daily  atorvastatin 80 milliGRAM(s) Oral at bedtime  dextrose 40% Gel 15 Gram(s) Oral once  dextrose 5%. 1000 milliLiter(s) (50 mL/Hr) IV Continuous <Continuous>  dextrose 5%. 1000 milliLiter(s) (100 mL/Hr) IV Continuous <Continuous>  dextrose 50% Injectable 25 Gram(s) IV Push once  dextrose 50% Injectable 12.5 Gram(s) IV Push once  dextrose 50% Injectable 25 Gram(s) IV Push once  glucagon  Injectable 1 milliGRAM(s) IntraMuscular once  heparin   Injectable 5000 Unit(s) SubCutaneous every 12 hours  insulin lispro (ADMELOG) corrective regimen sliding scale   SubCutaneous three times a day before meals  lactated ringers. 1000 milliLiter(s) (100 mL/Hr) IV Continuous <Continuous>  lamoTRIgine 225 milliGRAM(s) Oral two times a day  losartan 50 milliGRAM(s) Oral daily  pantoprazole    Tablet 40 milliGRAM(s) Oral before breakfast  tamsulosin 0.8 milliGRAM(s) Oral at bedtime      TELEMETRY: 	    ECG:  	  RADIOLOGY:  OTHER: 	  	  LABS:	 	    CARDIAC MARKERS:            08-31    142  |  104  |  10  ----------------------------<  116<H>  4.0   |  25  |  0.93    Ca    9.9      31 Aug 2021 07:23    TPro  7.1  /  Alb  4.3  /  TBili  0.5  /  DBili  x   /  AST  13  /  ALT  11  /  AlkPhos  73  08-31    proBNP:   Lipid Profile: Cholesterol --  LDL --  HDL --  TG 77  Cholesterol --  LDL --  HDL --      HgA1c:   TSH: Thyroid Stimulating Hormone, Serum: 1.31 uIU/mL (08-29 @ 09:29)    US and CT noted  in and out  IV fliuds  ppi once a day  pain meds as per primary   Lipase WNL today  continue diet as tolerated  may need ercp pending results of mrcp  Waiting for MRCP to be performed   will follow   dw patient       Assessment and plan  ---------------------------  55 year old male with PMH chronic back pain, ht, DM, seizure disorder on lamotrigine presents with abdominal pain x 2 weeks. Pt reports left sided back pain radiating to LLQ over the past 2 weeks associated with nausea and multiple episodes of nonbloody nonbilious emesis. Pt reports initial constipation and urge to defecate, but unable to. Pt states passing flatus, last BM this morning, "soft" per pt. Denies any fevers, chest pain, shortness of breath,  bloody stools, black tarry stools, dysuria, testicular pain, numbness, weakness, or rash. Denies any recent illness. Denies any recent injury or trauma. Denies any additional complaints  in er pt npo with increase bp, called to adjust bp meds iv as needed  pt on atenolol and Amlodipine/ benazapril at home.  no chest pain/ sob.  check ecg  will start iv beta blocker/ Vasotec for bp control, if pt is npo, otherwise continue all bp meds as before.  tsh noted  dvt prophylaxis  no need for any cardiac testing  dvt prophylaxis  start losartan 50 mg daily for better control will increase as needed  awaiting MRCP  echo   increase ambulation    	        
 afberile    REVIEW OF SYSTEMS:  GEN: no fever,    no chills  RESP: no SOB,   no cough  CVS: no chest pain,   no palpitations  GI: no abdominal pain,   no nausea,   no vomiting,   no constipation,   no diarrhea  : no dysuria,   no frequency  NEURO: no headache,   no dizziness  PSYCH: no depression,   not anxious  Derm : no rash    MEDICATIONS  (STANDING):  acetaminophen  IVPB .. 1000 milliGRAM(s) IV Intermittent once  amLODIPine   Tablet 5 milliGRAM(s) Oral daily  ATENolol  Tablet 50 milliGRAM(s) Oral daily  atorvastatin 80 milliGRAM(s) Oral at bedtime  dextrose 40% Gel 15 Gram(s) Oral once  dextrose 5%. 1000 milliLiter(s) (50 mL/Hr) IV Continuous <Continuous>  dextrose 5%. 1000 milliLiter(s) (100 mL/Hr) IV Continuous <Continuous>  dextrose 50% Injectable 25 Gram(s) IV Push once  dextrose 50% Injectable 12.5 Gram(s) IV Push once  dextrose 50% Injectable 25 Gram(s) IV Push once  glucagon  Injectable 1 milliGRAM(s) IntraMuscular once  heparin   Injectable 5000 Unit(s) SubCutaneous every 12 hours  insulin lispro (ADMELOG) corrective regimen sliding scale   SubCutaneous three times a day before meals  lactated ringers. 1000 milliLiter(s) (100 mL/Hr) IV Continuous <Continuous>  lamoTRIgine 225 milliGRAM(s) Oral two times a day  LORazepam     Tablet 1 milliGRAM(s) Oral once  losartan 50 milliGRAM(s) Oral daily  pantoprazole    Tablet 40 milliGRAM(s) Oral before breakfast  tamsulosin 0.8 milliGRAM(s) Oral at bedtime    MEDICATIONS  (PRN):  clonazePAM  Tablet 2 milliGRAM(s) Oral two times a day PRN anxiety  oxycodone    5 mG/acetaminophen 325 mG 1 Tablet(s) Oral every 6 hours PRN Severe Pain (7 - 10)      Vital Signs Last 24 Hrs  T(C): 37.1 (31 Aug 2021 05:07), Max: 37.1 (30 Aug 2021 09:00)  T(F): 98.7 (31 Aug 2021 05:07), Max: 98.7 (30 Aug 2021 09:00)  HR: 79 (31 Aug 2021 05:07) (72 - 86)  BP: 154/74 (31 Aug 2021 05:07) (149/75 - 167/76)  BP(mean): --  RR: 18 (31 Aug 2021 05:07) (17 - 18)  SpO2: 95% (31 Aug 2021 05:07) (95% - 99%)  CAPILLARY BLOOD GLUCOSE      POCT Blood Glucose.: 169 mg/dL (30 Aug 2021 12:36)  POCT Blood Glucose.: 161 mg/dL (30 Aug 2021 08:39)    I&O's Summary    30 Aug 2021 07:01  -  31 Aug 2021 07:00  --------------------------------------------------------  IN: 760 mL / OUT: 0 mL / NET: 760 mL        PHYSICAL EXAM:  HEAD:  Atraumatic, Normocephalic  NECK: Supple, No   JVD  CHEST/LUNG:   no     rales,     no,    rhonchi  HEART: Regular rate and rhythm;         murmur  ABDOMEN: Soft, Nontender, ;   EXTREMITIES:   no     edema  NEUROLOGY:  alert    LABS:        TPro  6.9  /  Alb  4.1  /  TBili  0.6  /  DBili  0.2  /  AST  13  /  ALT  12  /  AlkPhos  77  08-30                    Thyroid Stimulating Hormone, Serum: 1.31 uIU/mL (08-29 @ 09:29)          Consultant(s) Notes Reviewed:      Care Discussed with Consultants/Other Providers:    
   afberile  REVIEW OF SYSTEMS:  GEN: no fever,    no chills  RESP: no SOB,   no cough  CVS: no chest pain,   no palpitations  GI: no abdominal pain,   no nausea,   no vomiting,   no constipation,   no diarrhea  : no dysuria,   no frequency  NEURO: no headache,   no dizziness  PSYCH: no depression,   not anxious  Derm : no rash    MEDICATIONS  (STANDING):  amLODIPine   Tablet 5 milliGRAM(s) Oral daily  ATENolol  Tablet 50 milliGRAM(s) Oral daily  atorvastatin 80 milliGRAM(s) Oral at bedtime  dextrose 40% Gel 15 Gram(s) Oral once  dextrose 5%. 1000 milliLiter(s) (50 mL/Hr) IV Continuous <Continuous>  dextrose 5%. 1000 milliLiter(s) (100 mL/Hr) IV Continuous <Continuous>  dextrose 50% Injectable 25 Gram(s) IV Push once  dextrose 50% Injectable 12.5 Gram(s) IV Push once  dextrose 50% Injectable 25 Gram(s) IV Push once  glucagon  Injectable 1 milliGRAM(s) IntraMuscular once  heparin   Injectable 5000 Unit(s) SubCutaneous every 12 hours  insulin lispro (ADMELOG) corrective regimen sliding scale   SubCutaneous three times a day before meals  lactated ringers. 1000 milliLiter(s) (100 mL/Hr) IV Continuous <Continuous>  lamoTRIgine 225 milliGRAM(s) Oral two times a day  LORazepam     Tablet 1 milliGRAM(s) Oral once  losartan 50 milliGRAM(s) Oral daily  pantoprazole    Tablet 40 milliGRAM(s) Oral before breakfast  tamsulosin 0.8 milliGRAM(s) Oral at bedtime    MEDICATIONS  (PRN):  acetaminophen    Suspension .. 650 milliGRAM(s) Oral every 6 hours PRN Temp greater or equal to 38C (100.4F), Mild Pain (1 - 3)  clonazePAM  Tablet 2 milliGRAM(s) Oral two times a day PRN anxiety      Vital Signs Last 24 Hrs  T(C): 36.8 (30 Aug 2021 04:46), Max: 37.1 (29 Aug 2021 10:18)  T(F): 98.2 (30 Aug 2021 04:46), Max: 98.7 (29 Aug 2021 10:18)  HR: 78 (30 Aug 2021 06:32) (64 - 78)  BP: 161/85 (30 Aug 2021 06:32) (132/70 - 161/85)  BP(mean): --  RR: 18 (30 Aug 2021 04:46) (18 - 18)  SpO2: 98% (30 Aug 2021 04:46) (96% - 98%)  CAPILLARY BLOOD GLUCOSE      POCT Blood Glucose.: 128 mg/dL (29 Aug 2021 21:52)  POCT Blood Glucose.: 95 mg/dL (29 Aug 2021 17:35)  POCT Blood Glucose.: 257 mg/dL (29 Aug 2021 12:34)  POCT Blood Glucose.: 124 mg/dL (29 Aug 2021 09:03)    I&O's Summary    29 Aug 2021 07:01  -  30 Aug 2021 07:00  --------------------------------------------------------  IN: 2120 mL / OUT: 0 mL / NET: 2120 mL        PHYSICAL EXAM:  HEAD:  Atraumatic, Normocephalic  NECK: Supple, No   JVD  CHEST/LUNG:   no     rales,     no,    rhonchi  HEART: Regular rate and rhythm;         murmur  ABDOMEN: Soft, Nontender, ;   EXTREMITIES:   no     edema  NEUROLOGY:  alert    LABS:                        13.8   8.05  )-----------( 194      ( 29 Aug 2021 08:03 )             40.5     08-29    137  |  102  |  13  ----------------------------<  147<H>  3.9   |  22  |  0.85    Ca    9.6      29 Aug 2021 08:03    TPro  6.9  /  Alb  4.1  /  TBili  0.6  /  DBili  0.2  /  AST  13  /  ALT  12  /  AlkPhos  77  08-30                            Consultant(s) Notes Reviewed:      Care Discussed with Consultants/Other Providers:    
   afberile  REVIEW OF SYSTEMS:  GEN: no fever,    no chills  RESP: no SOB,   no cough  CVS: no chest pain,   no palpitations  GI: no abdominal pain,   no nausea,   no vomiting,   no constipation,   no diarrhea  : no dysuria,   no frequency  NEURO: no headache,   no dizziness  PSYCH: no depression,   not anxious  Derm : no rash    MEDICATIONS  (STANDING):  amLODIPine   Tablet 5 milliGRAM(s) Oral daily  ATENolol  Tablet 50 milliGRAM(s) Oral daily  atorvastatin 80 milliGRAM(s) Oral at bedtime  dextrose 40% Gel 15 Gram(s) Oral once  dextrose 5%. 1000 milliLiter(s) (50 mL/Hr) IV Continuous <Continuous>  dextrose 5%. 1000 milliLiter(s) (100 mL/Hr) IV Continuous <Continuous>  dextrose 50% Injectable 25 Gram(s) IV Push once  dextrose 50% Injectable 12.5 Gram(s) IV Push once  dextrose 50% Injectable 25 Gram(s) IV Push once  glucagon  Injectable 1 milliGRAM(s) IntraMuscular once  heparin   Injectable 5000 Unit(s) SubCutaneous every 12 hours  insulin lispro (ADMELOG) corrective regimen sliding scale   SubCutaneous three times a day before meals  lactated ringers. 1000 milliLiter(s) (100 mL/Hr) IV Continuous <Continuous>  lamoTRIgine 225 milliGRAM(s) Oral two times a day  LORazepam     Tablet 1 milliGRAM(s) Oral once  pantoprazole    Tablet 40 milliGRAM(s) Oral before breakfast  tamsulosin 0.8 milliGRAM(s) Oral at bedtime    MEDICATIONS  (PRN):  clonazePAM  Tablet 2 milliGRAM(s) Oral two times a day PRN anxiety      Vital Signs Last 24 Hrs  T(C): 36.9 (29 Aug 2021 05:41), Max: 37.1 (28 Aug 2021 09:35)  T(F): 98.5 (29 Aug 2021 05:41), Max: 98.7 (28 Aug 2021 09:35)  HR: 98 (29 Aug 2021 05:41) (76 - 98)  BP: 160/79 (29 Aug 2021 05:41) (145/80 - 166/80)  BP(mean): --  RR: 18 (29 Aug 2021 05:41) (18 - 18)  SpO2: 99% (29 Aug 2021 05:41) (97% - 99%)  CAPILLARY BLOOD GLUCOSE      POCT Blood Glucose.: 132 mg/dL (29 Aug 2021 01:14)    I&O's Summary    28 Aug 2021 07:01  -  29 Aug 2021 07:00  --------------------------------------------------------  IN: 1440 mL / OUT: 0 mL / NET: 1440 mL        PHYSICAL EXAM:  HEAD:  Atraumatic, Normocephalic  NECK: Supple, No   JVD  CHEST/LUNG:   no     rales,     no,    rhonchi  HEART: Regular rate and rhythm;         murmur  ABDOMEN: Soft, Nontender, ;   EXTREMITIES:     no   edema  NEUROLOGY:  alert    LABS:                        13.8   8.05  )-----------( 194      ( 29 Aug 2021 08:03 )             40.5     08-29    137  |  102  |  13  ----------------------------<  147<H>  3.9   |  22  |  0.85    Ca    9.6      29 Aug 2021 08:03    TPro  7.0  /  Alb  4.1  /  TBili  0.6  /  DBili  x   /  AST  11  /  ALT  12  /  AlkPhos  81  08-29          Urinalysis Basic - ( 28 Aug 2021 07:27 )    Color: Light Yellow / Appearance: Clear / SG: >1.050 / pH: x  Gluc: x / Ketone: Negative  / Bili: Negative / Urobili: Negative   Blood: x / Protein: Trace / Nitrite: Negative   Leuk Esterase: Negative / RBC: 1 /hpf / WBC 0 /HPF   Sq Epi: x / Non Sq Epi: 0 /hpf / Bacteria: Negative          08-28 @ 04:18  4.3  44              Consultant(s) Notes Reviewed:      Care Discussed with Consultants/Other Providers:    
Medicine Progress Note    Patient is a 55y old  Male who presents with a chief complaint of abd pain (29 Aug 2021 08:48)  pt c/o abdominal pain radiating to the back and nausea.  tolerating some PO.      ADDITIONAL REVIEW OF SYSTEMS:  General:  No wt loss, fevers, chills, night sweats, fatigue,   Eyes:  Good vision, no reported pain  ENT:  No sore throat, pain, runny nose, dysphagia  CV:  No pain, palpitations, hypo/hypertension  Resp:  No dyspnea, cough, tachypnea, wheezing  GI:  + pain, + nausea, No vomiting, No diarrhea, No constipation, No weight loss, No fever, No pruritis, No rectal bleeding, No tarry stools, No dysphagia,  :  No pain, bleeding, incontinence, nocturia  Muscle:  No pain, weakness  Neuro:  No weakness, tingling, memory problems  Psych:  No fatigue, insomnia, mood problems, depression  Endocrine:  No polyuria, polydipsia, cold/heat intolerance  Heme:  No petechiae, ecchymosis, easy bruisability  Skin:  No rash, tattoos, scars, edema      MEDICATIONS  (STANDING):  amLODIPine   Tablet 5 milliGRAM(s) Oral daily  ATENolol  Tablet 50 milliGRAM(s) Oral daily  atorvastatin 80 milliGRAM(s) Oral at bedtime  dextrose 40% Gel 15 Gram(s) Oral once  dextrose 5%. 1000 milliLiter(s) (50 mL/Hr) IV Continuous <Continuous>  dextrose 5%. 1000 milliLiter(s) (100 mL/Hr) IV Continuous <Continuous>  dextrose 50% Injectable 25 Gram(s) IV Push once  dextrose 50% Injectable 12.5 Gram(s) IV Push once  dextrose 50% Injectable 25 Gram(s) IV Push once  glucagon  Injectable 1 milliGRAM(s) IntraMuscular once  heparin   Injectable 5000 Unit(s) SubCutaneous every 12 hours  insulin lispro (ADMELOG) corrective regimen sliding scale   SubCutaneous three times a day before meals  lactated ringers. 1000 milliLiter(s) (100 mL/Hr) IV Continuous <Continuous>  lamoTRIgine 225 milliGRAM(s) Oral two times a day  LORazepam     Tablet 1 milliGRAM(s) Oral once  losartan 50 milliGRAM(s) Oral daily  pantoprazole    Tablet 40 milliGRAM(s) Oral before breakfast  tamsulosin 0.8 milliGRAM(s) Oral at bedtime    MEDICATIONS  (PRN):  clonazePAM  Tablet 2 milliGRAM(s) Oral two times a day PRN anxiety    CAPILLARY BLOOD GLUCOSE      POCT Blood Glucose.: 257 mg/dL (29 Aug 2021 12:34)  POCT Blood Glucose.: 124 mg/dL (29 Aug 2021 09:03)  POCT Blood Glucose.: 132 mg/dL (29 Aug 2021 01:14)    I&O's Summary    28 Aug 2021 07:01  -  29 Aug 2021 07:00  --------------------------------------------------------  IN: 1440 mL / OUT: 0 mL / NET: 1440 mL    29 Aug 2021 07:01  -  29 Aug 2021 15:44  --------------------------------------------------------  IN: 640 mL / OUT: 0 mL / NET: 640 mL        PHYSICAL EXAM:  Vital Signs Last 24 Hrs  T(C): 37 (29 Aug 2021 13:32), Max: 37.1 (29 Aug 2021 10:18)  T(F): 98.6 (29 Aug 2021 13:32), Max: 98.7 (29 Aug 2021 10:18)  HR: 71 (29 Aug 2021 13:32) (70 - 98)  BP: 153/81 (29 Aug 2021 13:32) (133/73 - 162/90)  BP(mean): --  RR: 18 (29 Aug 2021 13:32) (18 - 18)  SpO2: 98% (29 Aug 2021 13:32) (96% - 99%)  CONSTITUTIONAL: NAD, well-developed, well-groomed  ENMT: Moist oral mucosa, no pharyngeal injection or exudates; normal dentition  RESPIRATORY: Normal respiratory effort; lungs are clear to auscultation bilaterally  CARDIOVASCULAR: Regular rate and rhythm, normal S1 and S2, no murmur/rub/gallop; No lower extremity edema; Peripheral pulses are 2+ bilaterally  ABDOMEN: Nontender to palpation, normoactive bowel sounds, no rebound/guarding; No hepatosplenomegaly  PSYCH: A+O to person, place, and time; affect appropriate  NEUROLOGY: CN 2-12 are intact and symmetric; no gross sensory deficits   SKIN: No rashes; no palpable lesions    LABS:                        13.8   8.05  )-----------( 194      ( 29 Aug 2021 08:03 )             40.5     08-29    137  |  102  |  13  ----------------------------<  147<H>  3.9   |  22  |  0.85    Ca    9.6      29 Aug 2021 08:03    TPro  7.0  /  Alb  4.1  /  TBili  0.6  /  DBili  x   /  AST  11  /  ALT  12  /  AlkPhos  81  08-29          Urinalysis Basic - ( 28 Aug 2021 07:27 )    Color: Light Yellow / Appearance: Clear / SG: >1.050 / pH: x  Gluc: x / Ketone: Negative  / Bili: Negative / Urobili: Negative   Blood: x / Protein: Trace / Nitrite: Negative   Leuk Esterase: Negative / RBC: 1 /hpf / WBC 0 /HPF   Sq Epi: x / Non Sq Epi: 0 /hpf / Bacteria: Negative        Culture - Urine (collected 28 Aug 2021 10:16)  Source: Clean Catch Clean Catch (Midstream)  Final Report (29 Aug 2021 11:44):    No growth      COVID-19 PCR: NotDetec (28 Aug 2021 04:40)      RADIOLOGY & ADDITIONAL TESTS:  Imaging from Last 24 Hours:    Electrocardiogram/QTc Interval:    COORDINATION OF CARE:  Care Discussed with Consultants/Other Providers:  
Medicine Progress Note    Patient is a 55y old  Male who presents with a chief complaint of abd pain (29 Aug 2021 08:48)  pt reports abdominal pain is better  tolerating diet       ADDITIONAL REVIEW OF SYSTEMS:  General:  No wt loss, fevers, chills, night sweats, fatigue,   Eyes:  Good vision, no reported pain  ENT:  No sore throat, pain, runny nose, dysphagia  CV:  No pain, palpitations, hypo/hypertension  Resp:  No dyspnea, cough, tachypnea, wheezing  GI:  + pain, + nausea, No vomiting, No diarrhea, No constipation, No weight loss, No fever, No pruritis, No rectal bleeding, No tarry stools, No dysphagia,  :  No pain, bleeding, incontinence, nocturia  Muscle:  No pain, weakness  Neuro:  No weakness, tingling, memory problems  Psych:  No fatigue, insomnia, mood problems, depression  Endocrine:  No polyuria, polydipsia, cold/heat intolerance  Heme:  No petechiae, ecchymosis, easy bruisability  Skin:  No rash, tattoos, scars, edema      MEDICATIONS  (STANDING):  amLODIPine   Tablet 5 milliGRAM(s) Oral daily  ATENolol  Tablet 50 milliGRAM(s) Oral daily  atorvastatin 80 milliGRAM(s) Oral at bedtime  dextrose 40% Gel 15 Gram(s) Oral once  dextrose 5%. 1000 milliLiter(s) (50 mL/Hr) IV Continuous <Continuous>  dextrose 5%. 1000 milliLiter(s) (100 mL/Hr) IV Continuous <Continuous>  dextrose 50% Injectable 25 Gram(s) IV Push once  dextrose 50% Injectable 12.5 Gram(s) IV Push once  dextrose 50% Injectable 25 Gram(s) IV Push once  glucagon  Injectable 1 milliGRAM(s) IntraMuscular once  heparin   Injectable 5000 Unit(s) SubCutaneous every 12 hours  insulin lispro (ADMELOG) corrective regimen sliding scale   SubCutaneous three times a day before meals  lactated ringers. 1000 milliLiter(s) (100 mL/Hr) IV Continuous <Continuous>  lamoTRIgine 225 milliGRAM(s) Oral two times a day  LORazepam     Tablet 1 milliGRAM(s) Oral once  losartan 50 milliGRAM(s) Oral daily  pantoprazole    Tablet 40 milliGRAM(s) Oral before breakfast  tamsulosin 0.8 milliGRAM(s) Oral at bedtime    MEDICATIONS  (PRN):  clonazePAM  Tablet 2 milliGRAM(s) Oral two times a day PRN anxiety    CAPILLARY BLOOD GLUCOSE      POCT Blood Glucose.: 257 mg/dL (29 Aug 2021 12:34)  POCT Blood Glucose.: 124 mg/dL (29 Aug 2021 09:03)  POCT Blood Glucose.: 132 mg/dL (29 Aug 2021 01:14)    I&O's Summary    28 Aug 2021 07:01  -  29 Aug 2021 07:00  --------------------------------------------------------  IN: 1440 mL / OUT: 0 mL / NET: 1440 mL    29 Aug 2021 07:01  -  29 Aug 2021 15:44  --------------------------------------------------------  IN: 640 mL / OUT: 0 mL / NET: 640 mL        PHYSICAL EXAM:  Vital Signs Last 24 Hrs  T(C): 37 (29 Aug 2021 13:32), Max: 37.1 (29 Aug 2021 10:18)  T(F): 98.6 (29 Aug 2021 13:32), Max: 98.7 (29 Aug 2021 10:18)  HR: 71 (29 Aug 2021 13:32) (70 - 98)  BP: 153/81 (29 Aug 2021 13:32) (133/73 - 162/90)  BP(mean): --  RR: 18 (29 Aug 2021 13:32) (18 - 18)  SpO2: 98% (29 Aug 2021 13:32) (96% - 99%)  CONSTITUTIONAL: NAD, well-developed, well-groomed  ENMT: Moist oral mucosa, no pharyngeal injection or exudates; normal dentition  RESPIRATORY: Normal respiratory effort; lungs are clear to auscultation bilaterally  CARDIOVASCULAR: Regular rate and rhythm, normal S1 and S2, no murmur/rub/gallop; No lower extremity edema; Peripheral pulses are 2+ bilaterally  ABDOMEN: Nontender to palpation, normoactive bowel sounds, no rebound/guarding; No hepatosplenomegaly  PSYCH: A+O to person, place, and time; affect appropriate  NEUROLOGY: CN 2-12 are intact and symmetric; no gross sensory deficits   SKIN: No rashes; no palpable lesions    LABS:                        13.8   8.05  )-----------( 194      ( 29 Aug 2021 08:03 )             40.5     08-29    137  |  102  |  13  ----------------------------<  147<H>  3.9   |  22  |  0.85    Ca    9.6      29 Aug 2021 08:03    TPro  7.0  /  Alb  4.1  /  TBili  0.6  /  DBili  x   /  AST  11  /  ALT  12  /  AlkPhos  81  08-29          Urinalysis Basic - ( 28 Aug 2021 07:27 )    Color: Light Yellow / Appearance: Clear / SG: >1.050 / pH: x  Gluc: x / Ketone: Negative  / Bili: Negative / Urobili: Negative   Blood: x / Protein: Trace / Nitrite: Negative   Leuk Esterase: Negative / RBC: 1 /hpf / WBC 0 /HPF   Sq Epi: x / Non Sq Epi: 0 /hpf / Bacteria: Negative        Culture - Urine (collected 28 Aug 2021 10:16)  Source: Clean Catch Clean Catch (Midstream)  Final Report (29 Aug 2021 11:44):    No growth      COVID-19 PCR: NotDetec (28 Aug 2021 04:40)      RADIOLOGY & ADDITIONAL TESTS:  Imaging from Last 24 Hours:    Electrocardiogram/QTc Interval:    COORDINATION OF CARE:  Care Discussed with Consultants/Other Providers:  
Medicine Progress Note    Patient is a 55y old  Male who presents with a chief complaint of abd pain (29 Aug 2021 08:48)  still with pain nervous mother and mat grandmother  of pancreatic cancer    SUBJECTIVE / OVERNIGHT EVENTS:    ADDITIONAL REVIEW OF SYSTEMS:    MEDICATIONS  (STANDING):  amLODIPine   Tablet 5 milliGRAM(s) Oral daily  ATENolol  Tablet 50 milliGRAM(s) Oral daily  atorvastatin 80 milliGRAM(s) Oral at bedtime  dextrose 40% Gel 15 Gram(s) Oral once  dextrose 5%. 1000 milliLiter(s) (50 mL/Hr) IV Continuous <Continuous>  dextrose 5%. 1000 milliLiter(s) (100 mL/Hr) IV Continuous <Continuous>  dextrose 50% Injectable 25 Gram(s) IV Push once  dextrose 50% Injectable 12.5 Gram(s) IV Push once  dextrose 50% Injectable 25 Gram(s) IV Push once  glucagon  Injectable 1 milliGRAM(s) IntraMuscular once  heparin   Injectable 5000 Unit(s) SubCutaneous every 12 hours  insulin lispro (ADMELOG) corrective regimen sliding scale   SubCutaneous three times a day before meals  lactated ringers. 1000 milliLiter(s) (100 mL/Hr) IV Continuous <Continuous>  lamoTRIgine 225 milliGRAM(s) Oral two times a day  LORazepam     Tablet 1 milliGRAM(s) Oral once  losartan 50 milliGRAM(s) Oral daily  pantoprazole    Tablet 40 milliGRAM(s) Oral before breakfast  tamsulosin 0.8 milliGRAM(s) Oral at bedtime    MEDICATIONS  (PRN):  clonazePAM  Tablet 2 milliGRAM(s) Oral two times a day PRN anxiety    CAPILLARY BLOOD GLUCOSE      POCT Blood Glucose.: 257 mg/dL (29 Aug 2021 12:34)  POCT Blood Glucose.: 124 mg/dL (29 Aug 2021 09:03)  POCT Blood Glucose.: 132 mg/dL (29 Aug 2021 01:14)    I&O's Summary    28 Aug 2021 07:  -  29 Aug 2021 07:00  --------------------------------------------------------  IN: 1440 mL / OUT: 0 mL / NET: 1440 mL    29 Aug 2021 07:01  -  29 Aug 2021 15:44  --------------------------------------------------------  IN: 640 mL / OUT: 0 mL / NET: 640 mL        PHYSICAL EXAM:  Vital Signs Last 24 Hrs  T(C): 37 (29 Aug 2021 13:32), Max: 37.1 (29 Aug 2021 10:18)  T(F): 98.6 (29 Aug 2021 13:32), Max: 98.7 (29 Aug 2021 10:18)  HR: 71 (29 Aug 2021 13:32) (70 - 98)  BP: 153/81 (29 Aug 2021 13:32) (133/73 - 162/90)  BP(mean): --  RR: 18 (29 Aug 2021 13:32) (18 - 18)  SpO2: 98% (29 Aug 2021 13:32) (96% - 99%)  CONSTITUTIONAL: NAD, well-developed, well-groomed  ENMT: Moist oral mucosa, no pharyngeal injection or exudates; normal dentition  RESPIRATORY: Normal respiratory effort; lungs are clear to auscultation bilaterally  CARDIOVASCULAR: Regular rate and rhythm, normal S1 and S2, no murmur/rub/gallop; No lower extremity edema; Peripheral pulses are 2+ bilaterally  ABDOMEN: Nontender to palpation, normoactive bowel sounds, no rebound/guarding; No hepatosplenomegaly  PSYCH: A+O to person, place, and time; affect appropriate  NEUROLOGY: CN 2-12 are intact and symmetric; no gross sensory deficits   SKIN: No rashes; no palpable lesions    LABS:                        13.8   8.05  )-----------( 194      ( 29 Aug 2021 08:03 )             40.5         137  |  102  |  13  ----------------------------<  147<H>  3.9   |  22  |  0.85    Ca    9.6      29 Aug 2021 08:03    TPro  7.0  /  Alb  4.1  /  TBili  0.6  /  DBili  x   /  AST  11  /  ALT  12  /  AlkPhos  81            Urinalysis Basic - ( 28 Aug 2021 07:27 )    Color: Light Yellow / Appearance: Clear / SG: >1.050 / pH: x  Gluc: x / Ketone: Negative  / Bili: Negative / Urobili: Negative   Blood: x / Protein: Trace / Nitrite: Negative   Leuk Esterase: Negative / RBC: 1 /hpf / WBC 0 /HPF   Sq Epi: x / Non Sq Epi: 0 /hpf / Bacteria: Negative        Culture - Urine (collected 28 Aug 2021 10:16)  Source: Clean Catch Clean Catch (Midstream)  Final Report (29 Aug 2021 11:44):    No growth      COVID-19 PCR: NotDetec (28 Aug 2021 04:40)      RADIOLOGY & ADDITIONAL TESTS:  Imaging from Last 24 Hours:    Electrocardiogram/QTc Interval:    COORDINATION OF CARE:  Care Discussed with Consultants/Other Providers:  
PEACE RESTREPO 55y [id number]    Patient is a 55y old  Male who presents with a chief complaint of abd pain (30 Aug 2021 10:28)      Follow Up/CC:  ID following for pancreatitis    Interval History/ROS: no fever, abd pain controlled with meds    Allergies    No Known Allergies    Intolerances        ANTIMICROBIALS:      MEDICATIONS  (STANDING):  amLODIPine   Tablet 5 milliGRAM(s) Oral daily  ATENolol  Tablet 50 milliGRAM(s) Oral daily  atorvastatin 80 milliGRAM(s) Oral at bedtime  dextrose 40% Gel 15 Gram(s) Oral once  dextrose 5%. 1000 milliLiter(s) (50 mL/Hr) IV Continuous <Continuous>  dextrose 5%. 1000 milliLiter(s) (100 mL/Hr) IV Continuous <Continuous>  dextrose 50% Injectable 25 Gram(s) IV Push once  dextrose 50% Injectable 12.5 Gram(s) IV Push once  dextrose 50% Injectable 25 Gram(s) IV Push once  glucagon  Injectable 1 milliGRAM(s) IntraMuscular once  heparin   Injectable 5000 Unit(s) SubCutaneous every 12 hours  insulin lispro (ADMELOG) corrective regimen sliding scale   SubCutaneous three times a day before meals  lactated ringers. 1000 milliLiter(s) (100 mL/Hr) IV Continuous <Continuous>  lamoTRIgine 225 milliGRAM(s) Oral two times a day  LORazepam     Tablet 1 milliGRAM(s) Oral once  losartan 50 milliGRAM(s) Oral daily  pantoprazole    Tablet 40 milliGRAM(s) Oral before breakfast  tamsulosin 0.8 milliGRAM(s) Oral at bedtime    MEDICATIONS  (PRN):  acetaminophen    Suspension .. 650 milliGRAM(s) Oral every 6 hours PRN Temp greater or equal to 38C (100.4F), Mild Pain (1 - 3)  clonazePAM  Tablet 2 milliGRAM(s) Oral two times a day PRN anxiety        Vital Signs Last 24 Hrs  T(C): 37.1 (30 Aug 2021 09:00), Max: 37.1 (30 Aug 2021 09:00)  T(F): 98.7 (30 Aug 2021 09:00), Max: 98.7 (30 Aug 2021 09:00)  HR: 76 (30 Aug 2021 09:00) (64 - 78)  BP: 149/75 (30 Aug 2021 09:00) (132/70 - 161/85)  BP(mean): --  RR: 18 (30 Aug 2021 09:00) (18 - 18)  SpO2: 98% (30 Aug 2021 09:00) (97% - 98%)    CBC Full  -  ( 29 Aug 2021 08:03 )  WBC Count : 8.05 K/uL  RBC Count : 4.42 M/uL  Hemoglobin : 13.8 g/dL  Hematocrit : 40.5 %  Platelet Count - Automated : 194 K/uL  Mean Cell Volume : 91.6 fl  Mean Cell Hemoglobin : 31.2 pg  Mean Cell Hemoglobin Concentration : 34.1 gm/dL  Auto Neutrophil # : x  Auto Lymphocyte # : x  Auto Monocyte # : x  Auto Eosinophil # : x  Auto Basophil # : x  Auto Neutrophil % : x  Auto Lymphocyte % : x  Auto Monocyte % : x  Auto Eosinophil % : x  Auto Basophil % : x    08-29    137  |  102  |  13  ----------------------------<  147<H>  3.9   |  22  |  0.85    Ca    9.6      29 Aug 2021 08:03    TPro  6.9  /  Alb  4.1  /  TBili  0.6  /  DBili  0.2  /  AST  13  /  ALT  12  /  AlkPhos  77  08-30    LIVER FUNCTIONS - ( 30 Aug 2021 07:36 )  Alb: 4.1 g/dL / Pro: 6.9 g/dL / ALK PHOS: 77 U/L / ALT: 12 U/L / AST: 13 U/L / GGT: x               MICROBIOLOGY:  Clean Catch Clean Catch (Midstream)  08-28-21   No growth  --  --              v            RADIOLOGY

## 2021-09-01 NOTE — PROGRESS NOTE ADULT - ASSESSMENT
55 year old male       with PMH chronic back pain,/   lumbar  diskectomy on 7/2020,   DM, seizure disorder on lamotrigine       presents with abdominal pain x 2 weeks.       Pt reports left sided back pain radiating to LLQ over the past 2 weeks associated with nausea and multiple episodes of nonbloody nonbilious emesis.        Pt states passing flatus, last BM this morning, "soft" per pt.       Denies any fevers, chest pain, shortness of breath,  bloody stools, black tarry stools, dysuria, testicular pain, numbness, weakness, or rash.       . Denies any recent injury or trauma.  denies  any etoh use         abd pain. ha s resolved.  from  ?  acute pancreatitis   pt with elevated lipase    CT A.p. normal pancreas    gi d r stephanie     seizure  disorder, on meds/  anxiety, on prn  clonazepam  DM,  follow fs  HTN on norvasc/  atenolol/ benzapril at home  pt has no back pain now/ prior  spinal surg  on 2020  Ct  with   bursal collections' of  hip//   ?  infection  mri pelvis, pending/  per ID d r ksohy, no ab   lipase  has  dcereased/ still awiating mri abdomen    mri  spine. no discitis/ ?  loosening of  hardware   pt is  afebrile, no back pain  and has no infection, pt may  f/p with his own ortho  as  an out pt   mri abdomen,/  report pending   pt dressed up,  wants to leav e now,  states  he has   hard time  sleeing in hospital  he  wishes  to  f/p  with gi for  mri report  floor  team/ rn and np  aware    plan /d /c  home now       < from: CT Abdomen and Pelvis w/ IV Cont (08.28.21 @ 06:02) >  IMPRESSION:  No evidence of small bowel obstruction or active bowel inflammation.  There are 2 iliopsoas bursal collections on the right as well as a 3.8 x 2.2 cm bursal collection along the left anterior hip. Pelvic MRI can be obtained for further characterization.  --- End of Report ---  < end of copied text >    
55 year old male       with PMH chronic back pain,/   lumbar  diskectomy on 7/2020,   DM, seizure disorder on lamotrigine       presents with abdominal pain x 2 weeks.       Pt reports left sided back pain radiating to LLQ over the past 2 weeks associated with nausea and multiple episodes of nonbloody nonbilious emesis.        Pt states passing flatus, last BM this morning, "soft" per pt.       Denies any fevers, chest pain, shortness of breath,  bloody stools, black tarry stools, dysuria, testicular pain, numbness, weakness, or rash.       . Denies any recent injury or trauma.  denies  any etoh use         abd pain. ha s resolved.  from  ?  acute pancreatitis   pt with elevated lipase    CT A.p. normal pancreas    gi d r stephanie    clear liquids/ iv fluids  lipase  ha s decreased   seizure  disorder, on meds/  anxiety, on prn  clonazepam  DM,  follow fs  HTN on norvasc/  atenolol/ benzapril at home  pt has no back pain now/ prior  spinal surg  on 2020  Ct  with   bursal collections' of  hip//   ?  infection  mri pelvis, pending/  per ID d r ksohy, no ab   follow  c/s   mri spine/  rcp, pemdimg   on  dvt t ppx         < from: CT Abdomen and Pelvis w/ IV Cont (08.28.21 @ 06:02) >  IMPRESSION:  No evidence of small bowel obstruction or active bowel inflammation.  There are 2 iliopsoas bursal collections on the right as well as a 3.8 x 2.2 cm bursal collection along the left anterior hip. Pelvic MRI can be obtained for further characterization.  --- End of Report ---  < end of copied text >    
a/p pancreatitis ? gallstone    US and CT noted  in and out  IV fliuds  ppi once a day  pain meds as per primary   Lipase WNL today  continue diet as tolerated  may need ercp pending results of mrcp  Waiting for MRCP to be performed   will follow   dw patient       Advanced care planning was discussed with patient and family.  Advanced care planning forms were reviewed and discussed.  Risks, benefits and alternatives of gastroenterologic procedures were discussed in detail and all questions were answered.    30 minutes spent.   
a/p pancreatitis ? gallstone    check imaging us/ct scan  in and out  ivf aggressively  ppi once a day  serial lft and amylaee and lipase and r/o other causes of pancreatitis autoimmune  surgical consultation  may need ercp pending results of mrcp  
55 year old male       with PMH chronic back pain,/   lumbar  diskectomy on 7/2020,   DM, seizure disorder on lamotrigine       presents with abdominal pain x 2 weeks.       Pt reports left sided back pain radiating to LLQ over the past 2 weeks associated with nausea and multiple episodes of nonbloody nonbilious emesis.        Pt states passing flatus, last BM this morning, "soft" per pt.       Denies any fevers, chest pain, shortness of breath,  bloody stools, black tarry stools, dysuria, testicular pain, numbness, weakness, or rash.       . Denies any recent injury or trauma.  denies  any etoh use         abd pain. ha s resolved.  from  ?  acute pancreatitis   pt with elevated lipase    CT A.p. normal pancreas    gi d r stephanie   on iv fluids   seizure  disorder, on meds/  anxiety, on prn  clonazepam  DM,  follow fs  HTN on norvasc/  atenolol/ benzapril at home  pt has no back pain now/ prior  spinal surg  on 2020  Ct  with   bursal collections' of  hip//   ?  infection  mri pelvis, pending/  per ID d r ksohy, no ab   follow  c/s   mri spine/  rcp, pemdimg   on  dvt t ppx   lipase  has  dcereased/ still awiating mri          < from: CT Abdomen and Pelvis w/ IV Cont (08.28.21 @ 06:02) >  IMPRESSION:  No evidence of small bowel obstruction or active bowel inflammation.  There are 2 iliopsoas bursal collections on the right as well as a 3.8 x 2.2 cm bursal collection along the left anterior hip. Pelvic MRI can be obtained for further characterization.  --- End of Report ---  < end of copied text >    
a/p pancreatitis ? gallstone    US and CT noted  ppi once a day  pain meds as per primary   Lipase WNL   continue diet as tolerated  may need ercp pending results of mrcp  MRCP performed, results pending   will follow   dw patient       Advanced care planning was discussed with patient and family.  Advanced care planning forms were reviewed and discussed.  Risks, benefits and alternatives of gastroenterologic procedures were discussed in detail and all questions were answered.    30 minutes spent.   
55 year old male       with PMH chronic back pain,/   lumbar  diskectomy on 7/2020,   DM, seizure disorder on lamotrigine       presents with abdominal pain x 2 weeks.       Pt reports left sided back pain radiating to LLQ over the past 2 weeks associated with nausea and multiple episodes of nonbloody nonbilious emesis.        Pt states passing flatus, last BM this morning, "soft" per pt.       Denies any fevers, chest pain, shortness of breath,  bloody stools, black tarry stools, dysuria, testicular pain, numbness, weakness, or rash.       . Denies any recent injury or trauma.  denies  any etoh use         abd pain. ha s resolved.  from  ?  acute pancreatitis   pt with elevated lipase    CT A.p. normal pancreas    gi d r stephanie     seizure  disorder, on meds/  anxiety, on prn  clonazepam  DM,  follow fs  HTN on norvasc/  atenolol/ benzapril at home  pt has no back pain now/ prior  spinal surg  on 2020  Ct  with   bursal collections' of  hip//   ?  infection  mri pelvis, pending/  per ID d r ksohy, no ab   mri spine/  rcp, pemdimg   on  dvt t ppx   lipase  has  dcereased/ still awiating mri abdomen    mri  spine. no discitis/ ?  loosening of  hardware   pt is  afebrile, no back pain  and has no infection, pt may  f/p with his own ortho  as  an out pt   mri abdomen, pending       < from: CT Abdomen and Pelvis w/ IV Cont (08.28.21 @ 06:02) >  IMPRESSION:  No evidence of small bowel obstruction or active bowel inflammation.  There are 2 iliopsoas bursal collections on the right as well as a 3.8 x 2.2 cm bursal collection along the left anterior hip. Pelvic MRI can be obtained for further characterization.  --- End of Report ---  < end of copied text >    
a/p pancreatitis ? gallstone    check imaging us/ct scan  in and out  ivf aggressively  ppi once a day  serial lft and amylaee and lipase and r/o other causes of pancreatitis autoimmune  surgical consultation  may need ercp pending results of mrcp  mrcp later today  will follow   
55 year old male with PMH chronic back pain, DM, seizure disorder on lamotrigine presents with abdominal pain x 2 weeks with pancreatitis, left hip bursa collection     Mukul Daigle  Attending Physician   Division of Infectious Disease  Pager #[phone]  Available on [employer] Teams also  After 5pm/weekend or no response, call #[phone]

## 2021-09-01 NOTE — PROVIDER CONTACT NOTE (OTHER) - ACTION/TREATMENT ORDERED:
np made aware. stated team will speak to patient during rounds. no further action indicated at this time will continue to monitor

## 2021-09-01 NOTE — DISCHARGE NOTE PROVIDER - NSDCFUSCHEDAPPT_GEN_ALL_CORE_FT
PEACE RESTREPO ; 09/14/2021 ; [facility] [address] [address]  PEACE RESTREPO ; 09/27/2021 ; [facility] [address] [address] [facility]

## 2021-09-01 NOTE — DISCHARGE NOTE PROVIDER - NSDCMRMEDTOKEN_GEN_ALL_CORE_FT
acetaminophen 325 mg oral tablet: 2 tab(s) orally every 6 hours   amlodipine-benazepril 2.5 mg-10 mg oral capsule: 1 cap(s) orally once a day  aspirin 81 mg oral tablet: 1 tab(s) orally once a day  atenolol 50 mg oral tablet: 1 tab(s) orally once a day  Benadryl 25 mg oral tablet: 2 tab(s) orally every 6 hours, As Needed   clonazePAM 2 mg oral tablet: 1 cap(s) orally 2 times a day, As Needed  ibuprofen 400 mg oral tablet: 1 tab(s) orally every 4 hours   lamoTRIgine: 225 milligram(s) orally 2 times a day  magnesium hydroxide 8% oral suspension: 30 milliliter(s) orally every 12 hours, As needed, Constipation  metFORMIN 500 mg oral tablet, extended release: 2  orally 2 times a day  Multi Vitamin+: 1 cap(s) orally once a day  oxyCODONE-acetaminophen 10 mg-325 mg oral tablet: 1 tab(s) orally every 6 hours, As Needed  rolling walker s/p spine surgery :   rosuvastatin 20 mg oral tablet: 1 tab(s) orally once a day (at bedtime)  senna oral tablet: 2 tab(s) orally once a day (at bedtime)  tamsulosin 0.4 mg oral capsule: 2 cap(s) orally once a day (at bedtime) MDD:2   amLODIPine 5 mg oral tablet: 1 tab(s) orally once a day  atenolol 50 mg oral tablet: 1 tab(s) orally once a day  clonazePAM 2 mg oral tablet: 1 cap(s) orally 2 times a day, As Needed  lamoTRIgine: 225 milligram(s) orally 2 times a day  losartan 50 mg oral tablet: 1 tab(s) orally once a day  metFORMIN 500 mg oral tablet, extended release: 2  orally 2 times a day  Multi Vitamin+: 1 cap(s) orally once a day  oxycodone-acetaminophen 5 mg-325 mg oral tablet: 1 tab(s) orally every 6 hours, As needed, Severe Pain (7 - 10) MDD:4  rolling walker s/p spine surgery :   rosuvastatin 20 mg oral tablet: 1 tab(s) orally once a day (at bedtime)  senna oral tablet: 2 tab(s) orally once a day (at bedtime)  tamsulosin 0.4 mg oral capsule: 2 cap(s) orally once a day (at bedtime) MDD:2

## 2021-09-01 NOTE — DISCHARGE NOTE PROVIDER - NSDCCPCAREPLAN_GEN_ALL_CORE_FT
PRINCIPAL DISCHARGE DIAGNOSIS  Diagnosis: Acute pancreatitis  Assessment and Plan of Treatment: Resolved.  Follow up with Dr Saunders/Dr Crowley in 4 weeks .  Call to schedule appointment.      SECONDARY DISCHARGE DIAGNOSES  Diagnosis: Hypertension  Assessment and Plan of Treatment: Take your medication as prescribed.  Follow up with your medical doctor for routine blood pressure monitoring, and to establish long term blood pressure treatment goals.  Low salt diet  Activity as tolerated.  Notify your doctor if you have any of the following symptoms:   Dizziness, Lightheadedness, Blurry vision, Headache, Chest pain, Shortness of breath

## 2021-09-01 NOTE — PROGRESS NOTE ADULT - PROVIDER SPECIALTY LIST ADULT
Gastroenterology
Internal Medicine
Internal Medicine
Cardiology
Gastroenterology
Cardiology
Gastroenterology
Gastroenterology
Internal Medicine
Internal Medicine
Infectious Disease

## 2021-09-14 ENCOUNTER — APPOINTMENT (OUTPATIENT)
Dept: GASTROENTEROLOGY | Facility: CLINIC | Age: 56
End: 2021-09-14
Payer: MEDICAID

## 2021-09-14 VITALS — HEART RATE: 70 BPM | RESPIRATION RATE: 12 BRPM

## 2021-09-14 VITALS
DIASTOLIC BLOOD PRESSURE: 74 MMHG | BODY MASS INDEX: 25.8 KG/M2 | SYSTOLIC BLOOD PRESSURE: 122 MMHG | WEIGHT: 201 LBS | TEMPERATURE: 97.3 F | HEIGHT: 74 IN

## 2021-09-14 DIAGNOSIS — Z80.0 FAMILY HISTORY OF MALIGNANT NEOPLASM OF DIGESTIVE ORGANS: ICD-10-CM

## 2021-09-14 DIAGNOSIS — M51.26 OTHER INTERVERTEBRAL DISC DISPLACEMENT, LUMBAR REGION: ICD-10-CM

## 2021-09-14 PROCEDURE — 36415 COLL VENOUS BLD VENIPUNCTURE: CPT

## 2021-09-14 PROCEDURE — 99214 OFFICE O/P EST MOD 30 MIN: CPT | Mod: 25

## 2021-09-15 LAB
BASOPHILS # BLD AUTO: 0.05 K/UL
BASOPHILS NFR BLD AUTO: 0.7 %
CHOLEST SERPL-MCNC: 84 MG/DL
EOSINOPHIL # BLD AUTO: 0.11 K/UL
EOSINOPHIL NFR BLD AUTO: 1.5 %
GGT SERPL-CCNC: 17 U/L
HCT VFR BLD CALC: 40 %
HDLC SERPL-MCNC: 39 MG/DL
HGB BLD-MCNC: 13 G/DL
IMM GRANULOCYTES NFR BLD AUTO: 0.4 %
LDLC SERPL CALC-MCNC: 26 MG/DL
LPL SERPL-CCNC: 86 U/L
LYMPHOCYTES # BLD AUTO: 2.6 K/UL
LYMPHOCYTES NFR BLD AUTO: 35.4 %
MAN DIFF?: NORMAL
MCHC RBC-ENTMCNC: 30.5 PG
MCHC RBC-ENTMCNC: 32.5 GM/DL
MCV RBC AUTO: 93.9 FL
MONOCYTES # BLD AUTO: 0.54 K/UL
MONOCYTES NFR BLD AUTO: 7.4 %
NEUTROPHILS # BLD AUTO: 4.01 K/UL
NEUTROPHILS NFR BLD AUTO: 54.6 %
NONHDLC SERPL-MCNC: 45 MG/DL
PLATELET # BLD AUTO: 186 K/UL
RBC # BLD: 4.26 M/UL
RBC # FLD: 13.6 %
TRIGL SERPL-MCNC: 95 MG/DL
WBC # FLD AUTO: 7.34 K/UL

## 2021-09-15 NOTE — ASSESSMENT
[FreeTextEntry1] : 55-year-old male with cervical spine issues, recent left hip bursitis and recent admission to [facility] for mild pancreatitis.  At present the etiology is unclear as the abdominal ultrasound was unremarkable and the CAT scan of the pancreas was normal.  MRCP will be requested to exclude any pancreatic cyst or divisum.  Laboratory tests will be obtained for CMP, CBC and lipase as well as triglycerides and lipid panel.  Further recommendations will based upon the results of the MRCP which he request at [facility] radiology.

## 2021-09-15 NOTE — HISTORY OF PRESENT ILLNESS
[___ Month(s) Ago] : [unfilled] month(s) ago [None] : no changes [Hospitalization] : was hospitalized [Heartburn] : denies heartburn [Nausea] : stable nausea [Vomiting] : denies vomiting [Diarrhea] : denies diarrhea [Constipation] : denies constipation [Yellow Skin Or Eyes (Jaundice)] : denies jaundice [Abdominal Pain] : denies abdominal pain [Abdominal Swelling] : denies abdominal swelling [Rectal Pain] : denies rectal pain [Pancreatitis] : pancreatitis [_________] : Performed [unfilled] [Good Compliance] : good compliance with treatment [Good Tolerance] : good tolerance of treatment [Good Symptom Control] : good symptom control [Wt Gain ___ Lbs] : no recent weight gain [GERD] : no gastroesophageal reflux disease [Hiatus Hernia] : no hiatus hernia [Peptic Ulcer Disease] : no peptic ulcer disease [Cholelithiasis] : no cholelithiasis [Kidney Stone] : no kidney stone [Inflammatory Bowel Disease] : no inflammatory bowel disease [Irritable Bowel Syndrome] : no irritable bowel syndrome [Diverticulitis] : no diverticulitis [Alcohol Abuse] : no alcohol abuse [Malignancy] : no malignancy [Abdominal Surgery] : no abdominal surgery [Appendectomy] : no appendectomy [Cholecystectomy] : no cholecystectomy [de-identified] : pancreatitis [de-identified] : 55-year-old male with recent admission to [facility] with pancreatitis.  His lipase was less than 100.  However, he had waited more than a week before he made his way to the hospital and complained of some nausea and abdominal discomfort.  Abdominal ultrasound did not show any gallstones the CAT scan of the abdomen and pelvis showed a normal pancreas.  Incidentally noted was bursitis involving the left hip.  Since that time he is adhering to a low-fat diet fairly stringently.  He feels better but still has some bloating.  He is concerned because of his maternal grandmother's history of pancreatic cancer.  Has any alcohol usage.\par \par Hospital records and imaging reports were reviewed with the patient. [de-identified] : normal

## 2021-09-15 NOTE — CONSULT LETTER
[Dear  ___] : Dear  [unfilled], [Courtesy Letter:] : I had the pleasure of seeing your patient, [unfilled], in my office today. [Please see my note below.] : Please see my note below. [Consult Closing:] : Thank you very much for allowing me to participate in the care of this patient.  If you have any questions, please do not hesitate to contact me. [Sincerely,] : Sincerely, [FreeTextEntry3] : Pipo Ervin MD, FACP, AGAF, FAASLD\par  of Medicine\par [facility]\par

## 2021-09-15 NOTE — REVIEW OF SYSTEMS
[As Noted in HPI] : as noted in HPI [Limb Pain] : limb pain [Negative] : Heme/Lymph [FreeTextEntry7] : bloating [FreeTextEntry9] : left hip pain

## 2021-09-27 ENCOUNTER — APPOINTMENT (OUTPATIENT)
Dept: NEUROLOGY | Facility: CLINIC | Age: 56
End: 2021-09-27
Payer: MEDICAID

## 2021-09-27 VITALS
HEIGHT: 74 IN | HEART RATE: 73 BPM | WEIGHT: 196 LBS | DIASTOLIC BLOOD PRESSURE: 69 MMHG | SYSTOLIC BLOOD PRESSURE: 119 MMHG | BODY MASS INDEX: 25.15 KG/M2

## 2021-09-27 PROCEDURE — 99213 OFFICE O/P EST LOW 20 MIN: CPT

## 2021-09-27 NOTE — DATA REVIEWED
[de-identified] : 2007 MRI neg\par 1/2017: MRI neg (OSH)\par 2/2018: MRI C spine: C3-5 C7 canal stenosis, compression [de-identified] : 2007, 2009, 2011 AEEG 48hr neg\par The patient had ambulatory EEG monitoring in April 2016.  This has shown a left temporal seizure on the random one day recording.   [de-identified] : EMG 3/2018: CTS bilat R>L, ulnar neuropathy on the L\par

## 2021-09-27 NOTE — CONSULT LETTER
[Dear  ___] : Dear  [unfilled], [Consult Letter:] : I had the pleasure of evaluating your patient, [unfilled]. [( Thank you for referring [unfilled] for consultation for _____ )] : Thank you for referring [unfilled] for consultation for [unfilled] [Consult Closing:] : Thank you very much for allowing me to participate in the care of this patient.  If you have any questions, please do not hesitate to contact me. [Please see my note below.] : Please see my note below. [Sincerely,] : Sincerely, [Santo Boland MD] : Santo Boland MD [Attending, Dept. of Neurology, Division of Epilepsy] : Attending, Dept. of Neurology, Division of Epilepsy [[facility]] : [facility] [ of Neurology] :  of Neurology [FreeTextEntry2] : Dr. Peter Farrell\par [address] [place] [address]
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
## 2021-09-27 NOTE — PHYSICAL EXAM
[General Appearance - Alert] : alert [General Appearance - In No Acute Distress] : in no acute distress [Oriented To Time, Place, And Person] : oriented to person, place, and time [Impaired Insight] : insight and judgment were intact [Affect] : the affect was normal [Person] : oriented to person [Place] : oriented to place [Time] : oriented to time [Concentration Intact] : normal concentrating ability [Visual Intact] : visual attention was ~T not ~L decreased [Naming Objects] : no difficulty naming common objects [Repeating Phrases] : no difficulty repeating a phrase [Writing A Sentence] : no difficulty writing a sentence [Fluency] : fluency intact [Comprehension] : comprehension intact [Reading] : reading intact [Past History] : adequate knowledge of personal past history [Cranial Nerves Optic (II)] : visual acuity intact bilaterally,  visual fields full to confrontation, pupils equal round and reactive to light [Cranial Nerves Oculomotor (III)] : extraocular motion intact [Cranial Nerves Trigeminal (V)] : facial sensation intact symmetrically [Cranial Nerves Facial (VII)] : face symmetrical [Cranial Nerves Vestibulocochlear (VIII)] : hearing was intact bilaterally [Cranial Nerves Glossopharyngeal (IX)] : tongue and palate midline [Cranial Nerves Accessory (XI - Cranial And Spinal)] : head turning and shoulder shrug symmetric [Cranial Nerves Hypoglossal (XII)] : there was no tongue deviation with protrusion [Motor Tone] : muscle tone was normal in all four extremities [Motor Strength] : muscle strength was normal in all four extremities [No Muscle Atrophy] : normal bulk in all four extremities [Motor Handedness Right-Handed] : the patient is right hand dominant [Sensation Tactile Decrease] : light touch was intact [Dysesthesia] : dysesthesia was present [Abnormal Walk] : normal gait [Balance] : balance was intact [2+] : Triceps left 2+ [1+] : Ankle jerk right 1+ [0] : Ankle jerk left 0 [Sclera] : the sclera and conjunctiva were normal [Outer Ear] : the ears and nose were normal in appearance [Neck Appearance] : the appearance of the neck was normal [Apical Impulse] : the apical impulse was normal [Heart Rate And Rhythm] : heart rate was normal and rhythm regular [Abdomen Tenderness] : non-tender [Nail Clubbing] : no clubbing  or cyanosis of the fingernails [] : no rash [Skin Lesions] : no skin lesions [Registration Intact] : recent registration memory intact [Past-pointing] : there was no past-pointing [Tremor] : no tremor present [3+] : Biceps left 3+ [Plantar Reflex Right Only] : normal on the right [Plantar Reflex Left Only] : normal on the left [FreeTextEntry4] : talkative, mood stable.  sequential numbers letters to 8H.  distractible. no apraxia. 1/3 recall [FreeTextEntry6] : bilat CTS wrist braces [FreeTextEntry7] : in fingertips bilat

## 2021-09-27 NOTE — DISCUSSION/SUMMARY
[Medically Refractory (seizure within the last year)] : Medically Refractory (seizure within the last year) [Risks Associated with Driving/[place] Law] : As per my usual protocol, the patient was advised in regards to risks and driving privileges associated with the [place] Guidelines.  [Safety Recommendations] : The patient was advised in regards to the risk of seizures and general seizure safety recommendations including not to be bathing alone, climbing to high places and operating heavy machinery. [Compliance with Medications] : The importance of compliance with medications was reinforced. [Medication Side Effects] : High frequency and serious potential medication adverse effects were reviewed with the patient, including but not exclusive to psychiatric effects.  Information sheets on medication side effects were made available to the patient in our clinic.  The patient or advocate agrees to notify us for any concerns. [Sleep Hygiene/Sleep Disruption Risks] : Sleep hygiene and the risks of sleep disruption were discussed. [Obtain Copies of Medical Records] : Patient was asked to obtain copies of pertinent prior medical records or studies [Mental Health Evaluation] : Mental health evaluation” was recommended with either our  and psychologist, at [facility] ([facility] ): [phone], or [facility] Intake: [phone] [FreeTextEntry1] : Seizures since 2007 baseline was q1-5months, Likely underreported. 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  History of poor response to OXC, poor tolerability of VPA, PGB.  \par Prior NL MRI.\par Stress, anxiety, mood issues, poor med tolerability, poor insight a factor barriers to his own care.\par Chronic pain issues.  Sleep deprivation, insomnia an issue. Poor appetite.\par Memory complaints, focus and attention issues.\par h/o TBIs/concussions.\par \par On LTG level 11 in 4/2/2021, on 225mg bid.\par (N0 prior vimpat, ZNS, TPM)  \par Pt reluctant to pursue surgical options \par F/u LTG level, labs\par \par -would benefit from a psychiatrist, psychologist. saw LICSW.  Consider AD/SSRI (pt reluctant).  Stress reduction,  exercise.\par -Referred to sleep specialist in past.  Discussed sleep aide (tried ambien in the past)\par -Physical therapy\par \par -MRI c-spine and L-spine stenosis, f/u with Nsurg Dr Cortes.\par -s/p EMG for sensory disturbance of hands neuropathy: CTS: wear wrist guards at night\par \par voluntarily gave up driving\par \par total time 25min

## 2021-10-15 ENCOUNTER — NON-APPOINTMENT (OUTPATIENT)
Age: 56
End: 2021-10-15

## 2021-10-20 DIAGNOSIS — R11.0 NAUSEA: ICD-10-CM

## 2021-10-27 ENCOUNTER — APPOINTMENT (OUTPATIENT)
Dept: ORTHOPEDIC SURGERY | Facility: CLINIC | Age: 56
End: 2021-10-27
Payer: MEDICAID

## 2021-10-27 ENCOUNTER — NON-APPOINTMENT (OUTPATIENT)
Age: 56
End: 2021-10-27

## 2021-10-27 VITALS
SYSTOLIC BLOOD PRESSURE: 125 MMHG | BODY MASS INDEX: 25.03 KG/M2 | DIASTOLIC BLOOD PRESSURE: 67 MMHG | HEIGHT: 74 IN | HEART RATE: 74 BPM | WEIGHT: 195 LBS

## 2021-10-27 PROCEDURE — 99214 OFFICE O/P EST MOD 30 MIN: CPT

## 2021-10-27 NOTE — HISTORY OF PRESENT ILLNESS
[FreeTextEntry1] : Follow-up regarding recurrent right carpal tunnel syndrome, status post surgical release by another physician in 2006 as well as left carpal tunnel syndrome.  \par \par See note from when he was seen in the office almost 5 months ago.  We discussed treatment options.  I did recommend right carpal tunnel release but he opted for a cortisone injection at the right carpal tunnel.  This had to be delayed because he had just recently received the COVID vaccine.  He did not follow-up.\par \par He returns today, noting pain as well as numbness to the right and left hands. The right is worse than the left. He wears bilateral braces. He reports difficulty with gripping items. He is not taking medication for pain. He presents today to discuss surgical treatment. He rates his pain to be a 10 out of 01 at this time. \par \par EMGs from 4/8/2021 demonstrated moderate bilateral carpal tunnel syndrome and mild ulnar neuropathy at the left elbow. \par \par He has a history of type 2 diabetes.

## 2021-10-27 NOTE — DISCUSSION/SUMMARY
[FreeTextEntry1] : I had a discussion regarding today's visit, the diagnosis and treatment recommendations and options.  We also discussed changes since the last visit.  At this time, given his worsening symptoms, he would like to go ahead and schedule right carpal tunnel release.  This will be a revision open release.\par \par -  The nature and purposes of open carpal tunnel release was discussed in detail with the patient.   I discussed with the patient that I will be performing an open carpal tunnel release,We discussed the surgical procedure in detail, as well as expected postoperative recovery and outcome.\par -  Possible risks, benefits and complications (from known and unknown causes) of the procedure were discussed in detail.  \par -  Possible non-operative alternatives to the proposed treatment were discussed in detail.  \par -  He was told that possible risks/complications include, but are not limited to:  Infection, nerve or vessel injury, stiffness, painful scar, poor outcome, need for additional surgical procedures, and other unforeseen complications.  \par -  In addition, the possibility of an "unsuccessful outcome," despite "successful surgery," was discussed with the patient.  The patient understands that nerve recovery after surgical release can be unpredictable, and there are no "guarantees" that the preoperative symptoms will completely resolve.  This is particular true in his case, as he previously underwent right carpal tunnel release and this is a revision procedure.  He understands that results of revision carpal tunnel release a less predictable than primary release.  In addition, he does have other complaints in the hand, I told him that I cannot guarantee that all of these symptoms will resolve.\par -  The patient fully understands these risks and wishes to proceed.  \par -  I had a lengthy discussion with the patient regarding today's visit, the diagnosis, and my surgical treatment recommendations.  The patient has agreed to this plan of management and has expressed full understanding.  All questions were fully answered to the patient's satisfaction.\par \par My cumulative time spent on today's visit was greater than 30 minutes and included: Preparation for the visit, review of the medical records, review of pertinent diagnostic studies, examination and counseling of the patient on the above diagnosis, treatment plan and prognosis, orders of diagnostic tests, medications and/or appropriate procedures and documentation in the medical records of today's visit.

## 2021-10-27 NOTE — ADDENDUM
[FreeTextEntry1] : I, Hemalatha Pulido, acted solely as a scribe for Dr. Schmitz on this date on 10/27/2021.

## 2021-10-27 NOTE — PHYSICAL EXAM
[de-identified] : - Constitutional: This is a male in no obvious distress.  \par - Psych: Patient is alert and oriented to person, place and time.  Patient has a normal mood and affect.\par - Cardiovascular: Normal pulses throughout the upper extremities.  No significant varicosities are noted in the upper extremities. \par - Neuro: Strength and sensation are intact throughout the upper extremities.  Patient has normal coordination.\par - Respiratory:  Patient exhibits no evidence of shortness of breath or difficulty breathing.\par - Skin: No rashes, lesions, or other abnormalities are noted in the upper extremities.\par \par ---\par  \par Examination of his his right hand demonstrates a well-healed carpal tunnel incision.  There is no obvious thenar atrophy.  He has decreased sensation to light touch along the median nerve distribution with normal sensation along the radial and ulnar nerve distributions.  Provocative signs for carpal tunnel syndrome are positive.  There is no evidence of a trigger finger.\par \par Examination of his right elbow demonstrates no obvious swelling or tenderness along the ulnar nerve at the cubital tunnel.  Provocative signs for cubital tunnel syndrome are negative.  There is no instability of the ulnar nerve with flexion extension of the elbow.\par \par Examination of his left hand demonstrates slightly decreased sensation to light touch along the median nerve distribution with normal sensation along the radial and ulnar nerve distributions.  Provocative signs for carpal tunnel syndrome are positive.  There is no evidence of a trigger finger.  Provocative signs for cubital tunnel syndrome are negative.

## 2021-10-27 NOTE — END OF VISIT
[FreeTextEntry3] : This note was written by Hemalatha Pulido on 10/27/2021 acting solely as a scribe for Dr. Curry Schmitz.\par  \par All medical record entries made by the Scribe were at my, Dr. Curry Schmitz, direction and personally dictated by me on 10/27/2021. I have personally reviewed the chart and agree that the record accurately reflects my personal performance of the history, physical exam, assessment and plan.

## 2021-10-28 ENCOUNTER — NON-APPOINTMENT (OUTPATIENT)
Age: 56
End: 2021-10-28

## 2021-11-09 ENCOUNTER — APPOINTMENT (OUTPATIENT)
Dept: ORTHOPEDIC SURGERY | Facility: HOSPITAL | Age: 56
End: 2021-11-09

## 2021-11-10 ENCOUNTER — APPOINTMENT (OUTPATIENT)
Dept: ORTHOPEDIC SURGERY | Facility: CLINIC | Age: 56
End: 2021-11-10
Payer: MEDICAID

## 2021-11-10 DIAGNOSIS — G56.02 CARPAL TUNNEL SYNDROME, LEFT UPPER LIMB: ICD-10-CM

## 2021-11-10 PROCEDURE — 99214 OFFICE O/P EST MOD 30 MIN: CPT

## 2021-11-10 PROCEDURE — 73130 X-RAY EXAM OF HAND: CPT | Mod: RT,LT

## 2021-11-11 PROBLEM — G56.02 CARPAL TUNNEL SYNDROME OF LEFT WRIST: Status: ACTIVE | Noted: 2021-05-28

## 2021-11-15 ENCOUNTER — RX RENEWAL (OUTPATIENT)
Age: 56
End: 2021-11-15

## 2021-11-15 ENCOUNTER — NON-APPOINTMENT (OUTPATIENT)
Age: 56
End: 2021-11-15

## 2021-11-17 ENCOUNTER — RX RENEWAL (OUTPATIENT)
Age: 56
End: 2021-11-17

## 2021-11-18 NOTE — ED ADULT NURSE NOTE - NS ED NURSE IV DC DT
Quality 226: Preventive Care And Screening: Tobacco Use: Screening And Cessation Intervention: Patient screened for tobacco use and is an ex/non-smoker Quality 431: Preventive Care And Screening: Unhealthy Alcohol Use - Screening: Patient not identified as an unhealthy alcohol user when screened for unhealthy alcohol use using a systematic screening method Detail Level: Detailed 26-Dec-2019 23:41

## 2021-11-26 ENCOUNTER — OUTPATIENT (OUTPATIENT)
Dept: OUTPATIENT SERVICES | Facility: HOSPITAL | Age: 56
LOS: 1 days | End: 2021-11-26
Payer: MEDICAID

## 2021-11-26 VITALS
WEIGHT: 203.05 LBS | OXYGEN SATURATION: 98 % | SYSTOLIC BLOOD PRESSURE: 115 MMHG | RESPIRATION RATE: 18 BRPM | DIASTOLIC BLOOD PRESSURE: 65 MMHG | HEIGHT: 74 IN | HEART RATE: 64 BPM | TEMPERATURE: 97 F

## 2021-11-26 DIAGNOSIS — G56.01 CARPAL TUNNEL SYNDROME, RIGHT UPPER LIMB: ICD-10-CM

## 2021-11-26 DIAGNOSIS — Z98.890 OTHER SPECIFIED POSTPROCEDURAL STATES: Chronic | ICD-10-CM

## 2021-11-26 DIAGNOSIS — E11.9 TYPE 2 DIABETES MELLITUS WITHOUT COMPLICATIONS: ICD-10-CM

## 2021-11-26 DIAGNOSIS — Z98.89 OTHER SPECIFIED POSTPROCEDURAL STATES: Chronic | ICD-10-CM

## 2021-11-26 DIAGNOSIS — Z01.818 ENCOUNTER FOR OTHER PREPROCEDURAL EXAMINATION: ICD-10-CM

## 2021-11-26 PROCEDURE — 85027 COMPLETE CBC AUTOMATED: CPT

## 2021-11-26 PROCEDURE — G0463: CPT

## 2021-11-26 PROCEDURE — 80048 BASIC METABOLIC PNL TOTAL CA: CPT

## 2021-11-26 PROCEDURE — 36415 COLL VENOUS BLD VENIPUNCTURE: CPT

## 2021-11-26 PROCEDURE — 83036 HEMOGLOBIN GLYCOSYLATED A1C: CPT

## 2021-11-26 RX ORDER — LIDOCAINE HCL 20 MG/ML
0.2 VIAL (ML) INJECTION ONCE
Refills: 0 | Status: DISCONTINUED | OUTPATIENT
Start: 2021-12-02 | End: 2021-12-16

## 2021-11-26 RX ORDER — SODIUM CHLORIDE 9 MG/ML
3 INJECTION INTRAMUSCULAR; INTRAVENOUS; SUBCUTANEOUS EVERY 8 HOURS
Refills: 0 | Status: DISCONTINUED | OUTPATIENT
Start: 2021-12-02 | End: 2021-12-16

## 2021-11-26 RX ORDER — CLONAZEPAM 1 MG
1 TABLET ORAL
Qty: 0 | Refills: 0 | DISCHARGE

## 2021-11-26 NOTE — H&P PST ADULT - NSICDXPASTSURGICALHX_GEN_ALL_CORE_FT
PAST SURGICAL HISTORY:  H/O cervical spine surgery 2010 ACDf    History of lumbar discectomy 6/2020    Status Post Carpal Tunnel Release right 2006

## 2021-11-26 NOTE — H&P PST ADULT - NSICDXPASTMEDICALHX_GEN_ALL_CORE_FT
PAST MEDICAL HISTORY:  Benign Hypertension     Cervical disc disease with myelopathy     Cervical disc disorder with radiculopathy     Depressive disorder, not elsewhere classified     Enlargement (benign) of prostate     History of BPH     History of pancreatitis 8/2021    Hypercholesteremia     Lumbar radiculopathy     Seizure Since 2007, last seizure Fall 2018    Seizure disorder generalized ,   Since 2007, last seizure Fall 2018    Type II or unspecified type diabetes mellitus without mention of complication, not stated as uncontr 2009

## 2021-11-26 NOTE — H&P PST ADULT - HISTORY OF PRESENT ILLNESS
56yr old male with carpal tunnel syndrome bilateral. Pt complaining of  pain and numbness and dropping things. Coming in for revision right  carpal tunnel release with fat flap. Pt has hx of diabetes seizures last one 10/2018  neck and lumbar pain, Pancreatitis 8/2021 HTN. BPH    Note; covid test 11/29/21 [place]

## 2021-11-26 NOTE — H&P PST ADULT - VASCULAR
Procedure:  COLONOSCOPY    Relevant Problems   ANESTHESIA (within normal limits)      CARDIO   (+) Aneurysm of abdominal aorta (HCC)   (+) Bilateral carotid artery stenosis   (+) Hyperlipidemia   (+) Hypertension   (+) PAD (peripheral artery disease) (HCC)   (+) Thoracic aortic aneurysm without rupture (HCC)      ENDO (within normal limits)      GI/HEPATIC   (+) Gastroesophageal reflux      /RENAL   (+) Renal cyst, acquired      GYN (within normal limits)      HEMATOLOGY (within normal limits)      MUSCULOSKELETAL (within normal limits)      NEURO/PSYCH   (+) Anxiety   (+) Hx of adenomatous colonic polyps   (+) Recurrent major depression in full remission (HCC)      PULMONARY   (+) COPD (chronic obstructive pulmonary disease) (HCC)   (+) Sleep apnea      Other   (+) Brain injury (HCC)        Physical Exam    Airway    Mallampati score: II  TM Distance: >3 FB  Neck ROM: full     Dental   No notable dental hx     Cardiovascular  Cardiovascular exam normal    Pulmonary  Decreased breath sounds,     Other Findings        Anesthesia Plan  ASA Score- 3     Anesthesia Type- IV sedation with anesthesia with ASA Monitors  Additional Monitors:   Airway Plan:           Plan Factors-Exercise tolerance (METS): >4 METS  Chart reviewed  Patient is not a current smoker  Obstructive sleep apnea risk education given perioperatively  Induction- intravenous  Postoperative Plan-     Informed Consent- Anesthetic plan and risks discussed with patient 
Equal and normal pulses (carotid, femoral, dorsalis pedis)

## 2021-11-29 ENCOUNTER — OUTPATIENT (OUTPATIENT)
Dept: OUTPATIENT SERVICES | Facility: HOSPITAL | Age: 56
LOS: 1 days | End: 2021-11-29
Payer: MEDICAID

## 2021-11-29 DIAGNOSIS — Z98.890 OTHER SPECIFIED POSTPROCEDURAL STATES: Chronic | ICD-10-CM

## 2021-11-29 DIAGNOSIS — Z98.89 OTHER SPECIFIED POSTPROCEDURAL STATES: Chronic | ICD-10-CM

## 2021-11-29 DIAGNOSIS — Z11.52 ENCOUNTER FOR SCREENING FOR COVID-19: ICD-10-CM

## 2021-11-29 LAB — SARS-COV-2 RNA SPEC QL NAA+PROBE: SIGNIFICANT CHANGE UP

## 2021-11-29 PROCEDURE — U0005: CPT

## 2021-11-29 PROCEDURE — U0003: CPT

## 2021-11-29 PROCEDURE — C9803: CPT

## 2021-12-01 ENCOUNTER — TRANSCRIPTION ENCOUNTER (OUTPATIENT)
Age: 56
End: 2021-12-01

## 2021-12-01 PROBLEM — Z87.438 PERSONAL HISTORY OF OTHER DISEASES OF MALE GENITAL ORGANS: Chronic | Status: ACTIVE | Noted: 2021-11-26

## 2021-12-01 PROBLEM — Z87.19 PERSONAL HISTORY OF OTHER DISEASES OF THE DIGESTIVE SYSTEM: Chronic | Status: ACTIVE | Noted: 2021-11-26

## 2021-12-01 PROCEDURE — G9005: CPT

## 2021-12-02 ENCOUNTER — APPOINTMENT (OUTPATIENT)
Dept: ORTHOPEDIC SURGERY | Facility: HOSPITAL | Age: 56
End: 2021-12-02

## 2021-12-02 ENCOUNTER — OUTPATIENT (OUTPATIENT)
Dept: OUTPATIENT SERVICES | Facility: HOSPITAL | Age: 56
LOS: 1 days | End: 2021-12-02
Payer: MEDICAID

## 2021-12-02 VITALS
RESPIRATION RATE: 17 BRPM | HEART RATE: 60 BPM | DIASTOLIC BLOOD PRESSURE: 66 MMHG | SYSTOLIC BLOOD PRESSURE: 126 MMHG | OXYGEN SATURATION: 100 %

## 2021-12-02 VITALS
RESPIRATION RATE: 16 BRPM | HEART RATE: 61 BPM | DIASTOLIC BLOOD PRESSURE: 71 MMHG | WEIGHT: 203.05 LBS | TEMPERATURE: 98 F | HEIGHT: 74 IN | OXYGEN SATURATION: 97 % | SYSTOLIC BLOOD PRESSURE: 132 MMHG

## 2021-12-02 DIAGNOSIS — G56.01 CARPAL TUNNEL SYNDROME, RIGHT UPPER LIMB: ICD-10-CM

## 2021-12-02 DIAGNOSIS — Z98.89 OTHER SPECIFIED POSTPROCEDURAL STATES: Chronic | ICD-10-CM

## 2021-12-02 DIAGNOSIS — Z98.890 OTHER SPECIFIED POSTPROCEDURAL STATES: ICD-10-CM

## 2021-12-02 DIAGNOSIS — Z98.890 OTHER SPECIFIED POSTPROCEDURAL STATES: Chronic | ICD-10-CM

## 2021-12-02 LAB — GLUCOSE BLDC GLUCOMTR-MCNC: 130 MG/DL — HIGH (ref 70–99)

## 2021-12-02 PROCEDURE — 14040 TIS TRNFR F/C/C/M/N/A/G/H/F: CPT | Mod: RT

## 2021-12-02 PROCEDURE — 64721 CARPAL TUNNEL SURGERY: CPT | Mod: RT

## 2021-12-02 PROCEDURE — 82962 GLUCOSE BLOOD TEST: CPT

## 2021-12-02 RX ORDER — AMLODIPINE BESYLATE AND BENAZEPRIL HYDROCHLORIDE 10; 20 MG/1; MG/1
0 CAPSULE ORAL
Qty: 0 | Refills: 0 | DISCHARGE

## 2021-12-02 RX ORDER — LAMOTRIGINE 25 MG/1
225 TABLET, ORALLY DISINTEGRATING ORAL
Qty: 0 | Refills: 0 | DISCHARGE

## 2021-12-02 RX ORDER — CHOLECALCIFEROL (VITAMIN D3) 125 MCG
1 CAPSULE ORAL
Qty: 0 | Refills: 0 | DISCHARGE

## 2021-12-02 RX ORDER — ATENOLOL 25 MG/1
1 TABLET ORAL
Qty: 0 | Refills: 0 | DISCHARGE

## 2021-12-02 RX ORDER — ASPIRIN/CALCIUM CARB/MAGNESIUM 324 MG
1 TABLET ORAL
Qty: 0 | Refills: 0 | DISCHARGE

## 2021-12-02 RX ORDER — METFORMIN HYDROCHLORIDE 850 MG/1
2 TABLET ORAL
Qty: 0 | Refills: 0 | DISCHARGE

## 2021-12-02 RX ORDER — PREGABALIN 225 MG/1
0 CAPSULE ORAL
Qty: 0 | Refills: 0 | DISCHARGE

## 2021-12-02 RX ORDER — CHLORHEXIDINE GLUCONATE 213 G/1000ML
1 SOLUTION TOPICAL ONCE
Refills: 0 | Status: COMPLETED | OUTPATIENT
Start: 2021-12-02 | End: 2021-12-02

## 2021-12-02 RX ORDER — ROSUVASTATIN CALCIUM 5 MG/1
1 TABLET ORAL
Qty: 0 | Refills: 0 | DISCHARGE

## 2021-12-02 RX ADMIN — SODIUM CHLORIDE 3 MILLILITER(S): 9 INJECTION INTRAMUSCULAR; INTRAVENOUS; SUBCUTANEOUS at 06:30

## 2021-12-02 RX ADMIN — CHLORHEXIDINE GLUCONATE 1 APPLICATION(S): 213 SOLUTION TOPICAL at 06:44

## 2021-12-02 NOTE — ASU PATIENT PROFILE, ADULT - FALL HARM RISK - UNIVERSAL INTERVENTIONS
Bed in lowest position, wheels locked, appropriate side rails in place/Call bell, personal items and telephone in reach/Instruct patient to call for assistance before getting out of bed or chair/Non-slip footwear when patient is out of bed/[place] to call system/Physically safe environment - no spills, clutter or unnecessary equipment/Purposeful Proactive Rounding/Room/bathroom lighting operational, light cord in reach

## 2021-12-02 NOTE — ASU DISCHARGE PLAN (ADULT/PEDIATRIC) - CALL YOUR DOCTOR IF YOU HAVE ANY OF THE FOLLOWING:
Pain not relieved by Medications/Fever greater than (need to indicate Fahrenheit or Celsius)/Numbness, tingling, color or temperature change to extremity

## 2021-12-02 NOTE — ASU DISCHARGE PLAN (ADULT/PEDIATRIC) - NS MD DC FALL RISK RISK
For information on Fall & Injury Prevention, visit: [email or web address] OR  [email or web address] OR  https://www.cdc.gov/steadi/patient.html

## 2021-12-02 NOTE — ASU DISCHARGE PLAN (ADULT/PEDIATRIC) - CARE PROVIDER_API CALL
Keshia Perkins)  [address], [facility] [address]  [place] [address]  Phone: [phone]  Fax: [phone]  Follow Up Time: 2 weeks

## 2021-12-03 PROBLEM — Z98.890 STATUS POST SURGERY: Status: ACTIVE | Noted: 2021-12-03

## 2021-12-10 ENCOUNTER — APPOINTMENT (OUTPATIENT)
Dept: ORTHOPEDIC SURGERY | Facility: CLINIC | Age: 56
End: 2021-12-10
Payer: MEDICAID

## 2021-12-10 PROCEDURE — 99024 POSTOP FOLLOW-UP VISIT: CPT

## 2021-12-22 ENCOUNTER — APPOINTMENT (OUTPATIENT)
Dept: ORTHOPEDIC SURGERY | Facility: CLINIC | Age: 56
End: 2021-12-22
Payer: MEDICAID

## 2021-12-22 DIAGNOSIS — G56.01 CARPAL TUNNEL SYNDROME, RIGHT UPPER LIMB: ICD-10-CM

## 2021-12-22 PROCEDURE — 99024 POSTOP FOLLOW-UP VISIT: CPT

## 2021-12-27 ENCOUNTER — APPOINTMENT (OUTPATIENT)
Dept: ORTHOPEDIC SURGERY | Facility: CLINIC | Age: 56
End: 2021-12-27
Payer: MEDICAID

## 2021-12-27 VITALS
WEIGHT: 195 LBS | DIASTOLIC BLOOD PRESSURE: 67 MMHG | HEART RATE: 78 BPM | SYSTOLIC BLOOD PRESSURE: 112 MMHG | OXYGEN SATURATION: 96 % | BODY MASS INDEX: 25.03 KG/M2 | HEIGHT: 74 IN

## 2021-12-27 DIAGNOSIS — M15.9 POLYOSTEOARTHRITIS, UNSPECIFIED: ICD-10-CM

## 2021-12-27 PROCEDURE — 20611 DRAIN/INJ JOINT/BURSA W/US: CPT | Mod: LT

## 2021-12-27 PROCEDURE — 99204 OFFICE O/P NEW MOD 45 MIN: CPT | Mod: 25

## 2021-12-31 PROBLEM — M15.9 GENERALIZED OSTEOARTHRITIS: Status: ACTIVE | Noted: 2021-01-26

## 2022-02-04 ENCOUNTER — APPOINTMENT (OUTPATIENT)
Dept: SPINE | Facility: CLINIC | Age: 57
End: 2022-02-04
Payer: MEDICAID

## 2022-02-04 VITALS
HEIGHT: 74 IN | HEART RATE: 77 BPM | OXYGEN SATURATION: 96 % | WEIGHT: 200 LBS | BODY MASS INDEX: 25.67 KG/M2 | SYSTOLIC BLOOD PRESSURE: 143 MMHG | DIASTOLIC BLOOD PRESSURE: 79 MMHG

## 2022-02-04 PROCEDURE — 99212 OFFICE O/P EST SF 10 MIN: CPT

## 2022-02-04 RX ORDER — OXYCODONE 5 MG/1
5 TABLET ORAL
Qty: 14 | Refills: 0 | Status: DISCONTINUED | COMMUNITY
Start: 2021-06-04 | End: 2022-02-04

## 2022-02-04 RX ORDER — OXYCODONE 5 MG/1
5 TABLET ORAL
Qty: 5 | Refills: 0 | Status: DISCONTINUED | COMMUNITY
Start: 2021-12-03 | End: 2022-02-04

## 2022-02-04 RX ORDER — SODIUM SULFATE, MAGNESIUM SULFATE, AND POTASSIUM CHLORIDE 17.75; 2.7; 2.25 G/1; G/1; G/1
1479-225-188 TABLET ORAL
Qty: 24 | Refills: 0 | Status: DISCONTINUED | COMMUNITY
Start: 2021-04-27 | End: 2022-02-04

## 2022-02-04 RX ORDER — AMLODIPINE BESYLATE 5 MG/1
5 TABLET ORAL
Qty: 30 | Refills: 0 | Status: DISCONTINUED | COMMUNITY
Start: 2021-09-01 | End: 2022-02-04

## 2022-02-04 RX ORDER — OXYCODONE AND ACETAMINOPHEN 5; 325 MG/1; MG/1
5-325 TABLET ORAL
Qty: 8 | Refills: 0 | Status: DISCONTINUED | COMMUNITY
Start: 2021-09-01 | End: 2022-02-04

## 2022-02-04 RX ORDER — SULFAMETHOXAZOLE AND TRIMETHOPRIM 800; 160 MG/1; MG/1
800-160 TABLET ORAL
Qty: 20 | Refills: 0 | Status: DISCONTINUED | COMMUNITY
Start: 2021-12-10 | End: 2022-02-04

## 2022-02-04 RX ORDER — OXYCODONE HYDROCHLORIDE 30 MG/1
TABLET ORAL
Refills: 0 | Status: DISCONTINUED | COMMUNITY
End: 2022-02-04

## 2022-02-04 RX ORDER — RESVER/WINE/BFL/GRPSD/PC/C/POM 200MG-60MG
CAPSULE ORAL DAILY
Qty: 30 | Refills: 0 | Status: DISCONTINUED | COMMUNITY
Start: 2021-01-26 | End: 2022-02-04

## 2022-02-04 RX ORDER — IBUPROFEN 800 MG/1
800 TABLET, FILM COATED ORAL
Qty: 15 | Refills: 0 | Status: DISCONTINUED | COMMUNITY
Start: 2021-12-03 | End: 2022-02-04

## 2022-02-04 RX ORDER — SITAGLIPTIN 100 MG/1
100 TABLET, FILM COATED ORAL
Qty: 90 | Refills: 0 | Status: DISCONTINUED | COMMUNITY
Start: 2021-05-18 | End: 2022-02-04

## 2022-02-04 RX ORDER — ONDANSETRON 8 MG/1
8 TABLET, ORALLY DISINTEGRATING ORAL EVERY 8 HOURS
Qty: 30 | Refills: 1 | Status: DISCONTINUED | COMMUNITY
Start: 2021-10-20 | End: 2022-02-04

## 2022-02-04 RX ORDER — DICLOFENAC SODIUM 1% 10 MG/G
1 GEL TOPICAL
Qty: 1 | Refills: 0 | Status: DISCONTINUED | COMMUNITY
Start: 2021-01-26 | End: 2022-02-04

## 2022-02-04 RX ORDER — OXYCODONE 5 MG/1
5 TABLET ORAL
Qty: 5 | Refills: 0 | Status: DISCONTINUED | COMMUNITY
Start: 2021-12-01 | End: 2022-02-04

## 2022-02-04 NOTE — PHYSICAL EXAM
[General Appearance - Alert] : alert [General Appearance - In No Acute Distress] : in no acute distress [General Appearance - Well Nourished] : well nourished [General Appearance - Well Developed] : well developed [General Appearance - Well-Appearing] : healthy appearing [] : normal voice and communication [Oriented To Time, Place, And Person] : oriented to person, place, and time [Impaired Insight] : insight and judgment were intact [Affect] : the affect was normal [Mood] : the mood was normal [Memory Recent] : recent memory was not impaired [Memory Remote] : remote memory was not impaired [Person] : oriented to person [Place] : oriented to place [Time] : oriented to time [Short Term Intact] : short term memory intact [Remote Intact] : remote memory intact [Span Intact] : the attention span was normal [Concentration Intact] : normal concentrating ability [Fluency] : fluency intact [Comprehension] : comprehension intact [Current Events] : adequate knowledge of current events [Past History] : adequate knowledge of personal past history [Vocabulary] : adequate range of vocabulary [Cranial Nerves Optic (II)] : visual acuity intact bilaterally,  pupils equal round and reactive to light [Cranial Nerves Oculomotor (III)] : extraocular motion intact [Cranial Nerves Trigeminal (V)] : facial sensation intact symmetrically [Cranial Nerves Facial (VII)] : face symmetrical [Cranial Nerves Vestibulocochlear (VIII)] : hearing was intact bilaterally [Cranial Nerves Glossopharyngeal (IX)] : tongue and palate midline [Cranial Nerves Accessory (XI - Cranial And Spinal)] : head turning and shoulder shrug symmetric [Cranial Nerves Hypoglossal (XII)] : there was no tongue deviation with protrusion [Motor Tone] : muscle tone was normal in all four extremities [Motor Strength] : muscle strength was normal in all four extremities [No Muscle Atrophy] : normal bulk in all four extremities [5] : S1 ankle flexors 5/5 [4] : S1 toe walking 4/5 [Sensation Tactile Decrease] : light touch was intact [Abnormal Walk] : normal gait [Balance] : balance was intact [2+] : Ankle jerk left 2+ [Past-pointing] : there was no past-pointing [Tremor] : no tremor present [___] : absent on the right [___] : absent on the left [Stevens] : Stevens's sign was not demonstrated [FreeTextEntry6] : Some decreased sensation to the ball of his left foot due to an old injury when he stepped on a piece of metal. [FreeTextEntry1] : The patient is able to go up on his toes but has a little difficulty due to an old injury causing some numbness in the ball of his left foot.

## 2022-02-04 NOTE — ASSESSMENT
[FreeTextEntry1] : It was recommended that the patient try a course of physical therapy to relieve his neck, arm and mid and lower back pain.  It was also advised that he have cervical and lumbar AP and lateral x rays to check the construct and alignment of the fusions.  He is also to have a new MRI of the cervical spine without contrast to evaluate for any areas of new foraminal or canal stenosis.  The patient is to return to the office with these images for Dr. Homero Cortes to review.

## 2022-02-04 NOTE — REASON FOR VISIT
[Follow-Up: _____] : a [unfilled] follow-up visit [FreeTextEntry1] : PEACE RESTREPO is a 56 year old gentleman who underwent a C5-C6 and C6-C7 ACDF with Dr. Cortes on 10/03/2012 which he recovered well from and an L4-L5 decompression, diskectomy and interbody fusion with posterior instrumentation on 07/14/2020.  A lumbar CT done on 04/01/2021 revealed minimally displaced fractures of both L5 screws at the level of the junction of the paddle and the vertebral body.  A CT scan done at [facility] revealed solid fusion across the L4-L5 segment.  The patient stated that he had been feeling better, however in September he was ill with pancreatitis and was vomiting.  He than started with neck pain which radiates into both shoulders with throbbing in his hands and headaches and mid and lower back pain.  There is some radiation into his left shin which he stated that he had prior to surgery.  The patient denies bowel or bladder difficulties.  He has not done physical therapy but has been followed by pain management specialist, Dr. Sherman Buchanan and has received a lumbar epidural steroid injection with slight relief.  Mr. Restrepo stated that his pain is 10 on a scale from 0-10 today.\par

## 2022-02-28 ENCOUNTER — APPOINTMENT (OUTPATIENT)
Dept: SPINE | Facility: CLINIC | Age: 57
End: 2022-02-28

## 2022-03-01 ENCOUNTER — APPOINTMENT (OUTPATIENT)
Dept: PHYSICAL MEDICINE AND REHAB | Facility: CLINIC | Age: 57
End: 2022-03-01

## 2022-03-01 VITALS
TEMPERATURE: 97.3 F | OXYGEN SATURATION: 97 % | SYSTOLIC BLOOD PRESSURE: 165 MMHG | DIASTOLIC BLOOD PRESSURE: 75 MMHG | HEART RATE: 75 BPM

## 2022-03-03 ENCOUNTER — OUTPATIENT (OUTPATIENT)
Dept: OUTPATIENT SERVICES | Facility: HOSPITAL | Age: 57
LOS: 1 days | Discharge: HOME | End: 2022-03-03

## 2022-03-03 DIAGNOSIS — Z98.890 OTHER SPECIFIED POSTPROCEDURAL STATES: Chronic | ICD-10-CM

## 2022-03-03 DIAGNOSIS — Z98.89 OTHER SPECIFIED POSTPROCEDURAL STATES: Chronic | ICD-10-CM

## 2022-03-04 DIAGNOSIS — F41.8 OTHER SPECIFIED ANXIETY DISORDERS: ICD-10-CM

## 2022-03-14 ENCOUNTER — APPOINTMENT (OUTPATIENT)
Dept: SPINE | Facility: CLINIC | Age: 57
End: 2022-03-14
Payer: MEDICAID

## 2022-03-14 VITALS
WEIGHT: 205 LBS | HEIGHT: 74 IN | SYSTOLIC BLOOD PRESSURE: 126 MMHG | OXYGEN SATURATION: 98 % | DIASTOLIC BLOOD PRESSURE: 71 MMHG | HEART RATE: 83 BPM | BODY MASS INDEX: 26.31 KG/M2

## 2022-03-14 PROCEDURE — 99212 OFFICE O/P EST SF 10 MIN: CPT

## 2022-03-17 NOTE — ASSESSMENT
[FreeTextEntry1] : Ongoing neck and back pain. PT script provided today for neck and back pain. MRI lumbar spine no contrast ordered today. He will return after imaging.

## 2022-03-17 NOTE — REASON FOR VISIT
[Follow-Up: _____] : a [unfilled] follow-up visit [Other: _____] : [unfilled] [FreeTextEntry1] : Mr. Ramos is here today with ongoing neck and lower back pain. Neck pain radiates into both shoulders with throbbing in his hands, and headaches plus mid and lower back pain. Pain level is 10/10. He does not use any pain medication at this time. Jan 2022 he received one LESI from pain management specialist, Dr. Sherman Buchanan with minimal relief. No PT has been initiated. Denies bowel or urine incontinence.  Lumbar, Cervical xrays and MRI cervical reviewed today. \par .

## 2022-03-17 NOTE — HISTORY OF PRESENT ILLNESS
[FreeTextEntry1] : He underwent a C5-C6 and C6-C7 ACDF with Dr. Cortes on 10/03/2012 which he recovered well from and an L4-L5 decompression, diskectomy and interbody fusion with posterior instrumentation on 07/14/2020. A lumbar CT done on 04/01/2021 revealed minimally displaced fractures of both L5 screws at the level of the junction of the paddle and the vertebral body. A CT scan done at [facility] revealed solid fusion across the L4-L5 segment.\par \par \par

## 2022-03-17 NOTE — DATA REVIEWED
[de-identified] : Cervical spine from [facility] on 2/14/2022 [de-identified] : Lumbar and Cervical Xrays from [facility] on 2/10/2022

## 2022-03-21 ENCOUNTER — APPOINTMENT (OUTPATIENT)
Dept: ORTHOPEDIC SURGERY | Facility: CLINIC | Age: 57
End: 2022-03-21
Payer: MEDICAID

## 2022-03-21 VITALS
HEIGHT: 74 IN | DIASTOLIC BLOOD PRESSURE: 71 MMHG | SYSTOLIC BLOOD PRESSURE: 131 MMHG | WEIGHT: 205 LBS | BODY MASS INDEX: 26.31 KG/M2

## 2022-03-21 PROCEDURE — 76942 ECHO GUIDE FOR BIOPSY: CPT | Mod: RT

## 2022-03-21 PROCEDURE — 99214 OFFICE O/P EST MOD 30 MIN: CPT | Mod: 25

## 2022-03-21 PROCEDURE — 73030 X-RAY EXAM OF SHOULDER: CPT | Mod: RT

## 2022-03-21 PROCEDURE — 20552 NJX 1/MLT TRIGGER POINT 1/2: CPT | Mod: RT

## 2022-03-28 ENCOUNTER — APPOINTMENT (OUTPATIENT)
Dept: NEUROLOGY | Facility: CLINIC | Age: 57
End: 2022-03-28
Payer: MEDICAID

## 2022-03-28 ENCOUNTER — APPOINTMENT (OUTPATIENT)
Dept: ORTHOPEDIC SURGERY | Facility: CLINIC | Age: 57
End: 2022-03-28

## 2022-03-28 VITALS
SYSTOLIC BLOOD PRESSURE: 117 MMHG | BODY MASS INDEX: 25.17 KG/M2 | DIASTOLIC BLOOD PRESSURE: 72 MMHG | WEIGHT: 196 LBS | HEART RATE: 82 BPM

## 2022-03-28 PROCEDURE — 99213 OFFICE O/P EST LOW 20 MIN: CPT

## 2022-03-28 NOTE — DISCUSSION/SUMMARY
[Medically Refractory (seizure within the last year)] : Medically Refractory (seizure within the last year) [Risks Associated with Driving/[place] Law] : As per my usual protocol, the patient was advised in regards to risks and driving privileges associated with the [place] Guidelines.  [Safety Recommendations] : The patient was advised in regards to the risk of seizures and general seizure safety recommendations including not to be bathing alone, climbing to high places and operating heavy machinery. [Compliance with Medications] : The importance of compliance with medications was reinforced. [Medication Side Effects] : High frequency and serious potential medication adverse effects were reviewed with the patient, including but not exclusive to psychiatric effects.  Information sheets on medication side effects were made available to the patient in our clinic.  The patient or advocate agrees to notify us for any concerns. [Sleep Hygiene/Sleep Disruption Risks] : Sleep hygiene and the risks of sleep disruption were discussed. [Obtain Copies of Medical Records] : Patient was asked to obtain copies of pertinent prior medical records or studies [Mental Health Evaluation] : Mental health evaluation” was recommended with either our  and psychologist, at [facility] ([facility] ): [phone], or [facility] Intake: [phone] [FreeTextEntry1] : Seizures since 2007 baseline was q1-5months, Likely underreported. 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  History of poor response to OXC, poor tolerability of VPA, PGB.  \par Doing better on LTG, no seizures since 2018\par \par Prior NL MRI.\par Stress, anxiety, mood issues, poor med tolerability, poor insight a factor barriers to his own care.\par Chronic pain issues.  Sleep deprivation, insomnia an issue. Poor appetite.\par Memory complaints, focus and attention issues.\par h/o TBIs/concussions.\par \par On LTG level 11 in 4/2/2021, on 225mg bid.\par (No prior vimpat, ZNS, TPM)  \par F/u LTG level, labs\par \par -would benefit from a psychiatrist, psychologist. referred to LICSW.  Consider AD/SSRI (pt reluctant).  Stress reduction,  exercise.\par -Referred to sleep specialist in past.  Discussed sleep aide (tried ambien in the past)\par -Physical therapy\par \par -MRI c-spine and L-spine stenosis, f/u with Nsurg Dr Cortes.\par -s/p EMG for sensory disturbance of hands neuropathy: CTS: wear wrist guards at night\par \par resumed driving, no events since 2018\par \par total time 25min

## 2022-03-28 NOTE — PHYSICAL EXAM
[General Appearance - Alert] : alert [General Appearance - In No Acute Distress] : in no acute distress [Oriented To Time, Place, And Person] : oriented to person, place, and time [Impaired Insight] : insight and judgment were intact [Affect] : the affect was normal [Person] : oriented to person [Place] : oriented to place [Time] : oriented to time [Registration Intact] : recent registration memory intact [Concentration Intact] : normal concentrating ability [Visual Intact] : visual attention was ~T not ~L decreased [Naming Objects] : no difficulty naming common objects [Repeating Phrases] : no difficulty repeating a phrase [Writing A Sentence] : no difficulty writing a sentence [Fluency] : fluency intact [Comprehension] : comprehension intact [Reading] : reading intact [Past History] : adequate knowledge of personal past history [Cranial Nerves Optic (II)] : visual acuity intact bilaterally,  visual fields full to confrontation, pupils equal round and reactive to light [Cranial Nerves Oculomotor (III)] : extraocular motion intact [Cranial Nerves Trigeminal (V)] : facial sensation intact symmetrically [Cranial Nerves Facial (VII)] : face symmetrical [Cranial Nerves Vestibulocochlear (VIII)] : hearing was intact bilaterally [Cranial Nerves Glossopharyngeal (IX)] : tongue and palate midline [Cranial Nerves Accessory (XI - Cranial And Spinal)] : head turning and shoulder shrug symmetric [Cranial Nerves Hypoglossal (XII)] : there was no tongue deviation with protrusion [Motor Tone] : muscle tone was normal in all four extremities [Motor Strength] : muscle strength was normal in all four extremities [No Muscle Atrophy] : normal bulk in all four extremities [Motor Handedness Right-Handed] : the patient is right hand dominant [Sensation Tactile Decrease] : light touch was intact [Dysesthesia] : dysesthesia was present [Abnormal Walk] : normal gait [Balance] : balance was intact [2+] : Triceps left 2+ [3+] : Patella right 3+ [1+] : Ankle jerk right 1+ [0] : Ankle jerk left 0 [Sclera] : the sclera and conjunctiva were normal [Outer Ear] : the ears and nose were normal in appearance [Neck Appearance] : the appearance of the neck was normal [Apical Impulse] : the apical impulse was normal [Heart Rate And Rhythm] : heart rate was normal and rhythm regular [Abdomen Tenderness] : non-tender [Nail Clubbing] : no clubbing  or cyanosis of the fingernails [] : no rash [Skin Lesions] : no skin lesions [Past-pointing] : there was no past-pointing [Tremor] : no tremor present [Plantar Reflex Right Only] : normal on the right [Plantar Reflex Left Only] : normal on the left [FreeTextEntry4] : talkative, mood stable.  sequential numbers letters to 8H.  distractible. no apraxia. 1/3 recall [FreeTextEntry6] : CTS wrist surgery scars on RUE [FreeTextEntry7] : in fingertips bilat

## 2022-03-28 NOTE — DATA REVIEWED
[de-identified] : 2007 MRI neg\par 1/2017: MRI neg (OSH)\par 2/2018: MRI C spine: C3-5 C7 canal stenosis, compression [de-identified] : 2007, 2009, 2011 AEEG 48hr neg\par The patient had ambulatory EEG monitoring in April 2016.  This has shown a left temporal seizure on the random one day recording.   [de-identified] : EMG 3/2018: CTS bilat R>L, ulnar neuropathy on the L\par

## 2022-03-28 NOTE — CONSULT LETTER
[Dear  ___] : Dear  [unfilled], [Consult Letter:] : I had the pleasure of evaluating your patient, [unfilled]. [( Thank you for referring [unfilled] for consultation for _____ )] : Thank you for referring [unfilled] for consultation for [unfilled] [Please see my note below.] : Please see my note below. [Consult Closing:] : Thank you very much for allowing me to participate in the care of this patient.  If you have any questions, please do not hesitate to contact me. [Sincerely,] : Sincerely, [Santo Boland MD] : Santo Boland MD [Attending, Dept. of Neurology, Division of Epilepsy] : Attending, Dept. of Neurology, Division of Epilepsy [[facility]] : [facility] [ of Neurology] :  of Neurology [FreeTextEntry2] : Dr. Peter Farrell\par [address] [place] [address]
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
## 2022-03-30 NOTE — PATIENT PROFILE ADULT - NSPROHMSYMPCOND_GEN_A_NUR
Hub staff attempted to follow warm transfer process and was unsuccessful     Caller: Chris Moreno    Relationship to patient: Self    Best call back number: [phone]    Patient is needing: TO KNOW IF SHE NEEDS TO RESCHEDULE HER APPOINTMENT FOR TODAY AT 10:30. 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     PLEASE CALL TO CONFIRM HER APPOINTMENT OR RESCHEDULE FOR HER.            none

## 2022-04-18 ENCOUNTER — APPOINTMENT (OUTPATIENT)
Dept: SPINE | Facility: CLINIC | Age: 57
End: 2022-04-18
Payer: MEDICAID

## 2022-04-18 VITALS
HEIGHT: 74 IN | SYSTOLIC BLOOD PRESSURE: 143 MMHG | OXYGEN SATURATION: 97 % | HEART RATE: 70 BPM | BODY MASS INDEX: 25.15 KG/M2 | WEIGHT: 196 LBS | DIASTOLIC BLOOD PRESSURE: 77 MMHG

## 2022-04-18 DIAGNOSIS — Z98.1 ARTHRODESIS STATUS: ICD-10-CM

## 2022-04-18 PROCEDURE — 99213 OFFICE O/P EST LOW 20 MIN: CPT

## 2022-04-18 NOTE — REASON FOR VISIT
[Follow-Up: _____] : a [unfilled] follow-up visit [Other: _____] : [unfilled] [FreeTextEntry1] : Mr. Ramos is here today with right lower abdomen pain radiating into the groin for 7 months. Pain is not constant. He was evaluated by a urologist for the pain in groin and  exam was normal. Pain when worse is 8/10. He has occasional neck pain with some throbbing finger tips. Takes Tylenol with some relief. Denies any weakness of upper and lower extremities bowel or urine incontinence. MRI of lumbar spine was reviewed today. Is no significant neural or thecal impingement. He is taking only Tylenol and exercising on a regular basis.An MRI of the cervical spine does show some junctional stenosis at C4-5. He does not have any symptoms related to this.

## 2022-04-18 NOTE — ASSESSMENT
[FreeTextEntry1] : 56 year old with occasional right lower abdomen pain radiating to groin. No surgical intervention. Encouraged to stay active. RTO PRN.

## 2022-05-18 ENCOUNTER — RX RENEWAL (OUTPATIENT)
Age: 57
End: 2022-05-18

## 2022-06-22 ENCOUNTER — APPOINTMENT (OUTPATIENT)
Dept: ORTHOPEDIC SURGERY | Facility: CLINIC | Age: 57
End: 2022-06-22
Payer: MEDICAID

## 2022-06-22 VITALS
OXYGEN SATURATION: 97 % | BODY MASS INDEX: 25.15 KG/M2 | DIASTOLIC BLOOD PRESSURE: 72 MMHG | SYSTOLIC BLOOD PRESSURE: 134 MMHG | WEIGHT: 196 LBS | HEART RATE: 71 BPM | HEIGHT: 74 IN

## 2022-06-22 DIAGNOSIS — M70.72 OTHER BURSITIS OF HIP, LEFT HIP: ICD-10-CM

## 2022-06-22 PROCEDURE — 99214 OFFICE O/P EST MOD 30 MIN: CPT | Mod: 25

## 2022-06-22 PROCEDURE — 20611 DRAIN/INJ JOINT/BURSA W/US: CPT | Mod: LT

## 2022-06-23 PROBLEM — M70.72 HIP BURSITIS, LEFT: Status: ACTIVE | Noted: 2021-09-14

## 2022-06-26 NOTE — REASON FOR VISIT
[Follow-Up: _____] : a [unfilled] follow-up visit [FreeTextEntry1] : Mr. Ramos is here today for a follow up. He underwent L4-5 lumbar fusion on 7/14/2020. Today he reports

## 2022-06-27 ENCOUNTER — APPOINTMENT (OUTPATIENT)
Dept: SPINE | Facility: CLINIC | Age: 57
End: 2022-06-27

## 2022-06-28 ENCOUNTER — APPOINTMENT (OUTPATIENT)
Dept: ORTHOPEDIC SURGERY | Facility: CLINIC | Age: 57
End: 2022-06-28

## 2022-06-28 NOTE — ED PROVIDER NOTE - PMH
Benign Hypertension    Cervical disc disease with myelopathy    Cervical disc disorder with radiculopathy    Cervical disc displacement    Depressive disorder, not elsewhere classified    Diabetes    Hypercholesteremia    Seizure  5 years  Type II or unspecified type diabetes mellitus without mention of complication, not stated as uncontr No, not prescribed...

## 2022-07-05 ENCOUNTER — APPOINTMENT (OUTPATIENT)
Dept: ORTHOPEDIC SURGERY | Facility: CLINIC | Age: 57
End: 2022-07-05

## 2022-07-05 PROCEDURE — 99214 OFFICE O/P EST MOD 30 MIN: CPT | Mod: 25

## 2022-07-05 PROCEDURE — 20611 DRAIN/INJ JOINT/BURSA W/US: CPT | Mod: RT

## 2022-07-09 NOTE — HISTORY OF PRESENT ILLNESS
[de-identified] : Patient f/u s/p Left hip steroid injection of Iliopsoas bursitis. Patient reports significant relief in his symptoms after the injection. He states that he was able to return to his daily walks of at least 5 miles.\par Today he complains of Right anterior hip pain. States that the symptoms are similar to the left but the pain is not as severe.\par Aggravated by stairs and sit to stand motion\par Symptoms are not constant\par Alleviated with rest\par Denies night pain\par Denies radiating pain\par Medications - tylenol prn\par Patient is interested in steroid injection of the anterior Right hip today.

## 2022-07-09 NOTE — DISCUSSION/SUMMARY
[de-identified] : Discussed findings of today's exam and possible causes of patient's pain.  Educated patient on their most probable diagnosis of hip OA.  Reviewed possible courses of treatment, and we collaboratively decided best course of treatment at this time will include ultrasound guided cortisone injection. Follow up in 6 weeks.\par \par Anna Oconnor MD, EdM\par Sports Medicine [facility]\par Department of Orthopedics

## 2022-07-09 NOTE — PHYSICAL EXAM
[de-identified] : EXAM: \par Gen: in no acute distress, seated comfortably, moving easily\par Skin: No discoloration, rashes; on palpation skin is dry, \par Neuro: Normal sensation all dermatomes, motor all myotomes\par Vascular: Normal pulses, no edema, normal temperature\par Coordination and balance: Normal\par Psych: normal mood and affect, non pressured speech, alert and oriented x3\par Musculoskeletal:\par \par  right  HIP /PELVIS -\par Inspection reveals no bruising\par Range of motion testing shows full passive and active\par no pain with resisted adduction\par no pain with resisted hip flexion\par Hip maneuvers:\par +MONIKA\par +FADIR\par passive hip impingement sign negative\par MONIKA negative\par Log roll negative\par Resisted active straight leg raise negative\par no TTP of the buttock, greater trochanters, IT band \par NEURO - Normal bulk and tone \par LE strength 5/5 including hip flexion, knee flexion, knee extension, ankle dorsiflexion, ankle plantarflexion, eversion, and EHL \par Sensation - intact to light touch in bilateral lower extremities. \par LE Reflexes 2+ patellar and Achilles reflexes bilaterally \par no Clonus\par Coordination was age appropriate and intact in all 4 limbs. \par GAIT - Normal base, normal stride length, non-antalgic

## 2022-07-09 NOTE — PROCEDURE
[de-identified] : Ultrasound-Guided Diagnostic/Therapeutic Injection: right  Hip Intra-articular Injection.\par \par Indication for U/S Guidance: Ensure placement within the femoroacetabular joint for diagnostic purposes, while avoiding anterior neurovascular structures\par \par Indication for Injection: Osteoarthritis.\par \par A discussion was had with the patient regarding this procedure and all questions were answered. All risks, benefits and alternatives were discussed. These included but were not limited to bleeding, infection, and allergic reaction.  A timeout was done to ensure correct side and pt agreed to the procedure.  Chlorhexidine and alcohol was used to clean the skin in the anterior aspect of the hip joint. The femoroacetabular joint was visualized utilizing the Partenderorte  the Curvilinear 30cm 5-2 MHz transducer and  sterile ultrasound gel.  The joint was visualized in the longitudinal axis and an in-plane approach was used for the injection.  Ultrasound guidance was utilized to ensure accuracy of the intra-articular injection, and avoid the femoral neurovascular structures.  A 25-gauge 1.5" needle was first used to inject 3cc of 1% lidocaine without epi into the subcutaneous tissue and muscle for local anesthesia.  Following that a 22-gauge 3" needle was used to inject 4cc of 1% lidocaine without epinephrine and 1cc of 40mg/ml methylprednisolone into the femoroacetabular joint. An image confirming the correct location of the needle placement and infusion of the steroid at the end of the injection was saved.  A sterile bandage was then applied. The patient tolerated the procedure well and there were no complications.

## 2022-07-11 ENCOUNTER — EMERGENCY (EMERGENCY)
Facility: HOSPITAL | Age: 57
LOS: 1 days | Discharge: HOME | End: 2022-07-11
Attending: EMERGENCY MEDICINE
Payer: MEDICAID

## 2022-07-11 VITALS
HEIGHT: 74 IN | TEMPERATURE: 99 F | OXYGEN SATURATION: 98 % | HEART RATE: 99 BPM | RESPIRATION RATE: 20 BRPM | WEIGHT: 190.04 LBS | SYSTOLIC BLOOD PRESSURE: 151 MMHG | DIASTOLIC BLOOD PRESSURE: 72 MMHG

## 2022-07-11 DIAGNOSIS — Z98.890 OTHER SPECIFIED POSTPROCEDURAL STATES: Chronic | ICD-10-CM

## 2022-07-11 DIAGNOSIS — Z98.89 OTHER SPECIFIED POSTPROCEDURAL STATES: Chronic | ICD-10-CM

## 2022-07-11 PROCEDURE — 99285 EMERGENCY DEPT VISIT HI MDM: CPT

## 2022-07-11 PROCEDURE — 93010 ELECTROCARDIOGRAM REPORT: CPT

## 2022-07-11 PROCEDURE — 71100 X-RAY EXAM RIBS UNI 2 VIEWS: CPT | Mod: 26

## 2022-07-11 PROCEDURE — 73110 X-RAY EXAM OF WRIST: CPT | Mod: 26,RT

## 2022-07-11 PROCEDURE — 71046 X-RAY EXAM CHEST 2 VIEWS: CPT | Mod: 26

## 2022-07-11 RX ORDER — LAMOTRIGINE 25 MG/1
225 TABLET, ORALLY DISINTEGRATING ORAL ONCE
Refills: 0 | Status: COMPLETED | OUTPATIENT
Start: 2022-07-11 | End: 2022-07-11

## 2022-07-11 RX ORDER — OXYCODONE HYDROCHLORIDE 5 MG/1
5 TABLET ORAL ONCE
Refills: 0 | Status: DISCONTINUED | OUTPATIENT
Start: 2022-07-11 | End: 2022-07-11

## 2022-07-11 RX ORDER — LIDOCAINE 4 G/100G
1 CREAM TOPICAL ONCE
Refills: 0 | Status: COMPLETED | OUTPATIENT
Start: 2022-07-11 | End: 2022-07-11

## 2022-07-11 RX ORDER — ACETAMINOPHEN 500 MG
650 TABLET ORAL ONCE
Refills: 0 | Status: COMPLETED | OUTPATIENT
Start: 2022-07-11 | End: 2022-07-11

## 2022-07-11 RX ORDER — IBUPROFEN 200 MG
400 TABLET ORAL ONCE
Refills: 0 | Status: COMPLETED | OUTPATIENT
Start: 2022-07-11 | End: 2022-07-11

## 2022-07-11 NOTE — ED PROVIDER NOTE - PHYSICAL EXAMINATION
Attending MD Salazar:    Gen:  awake and alert, somewhat uncomfortable appearing, GCS 15  Head: scalp atraumatic  Neck: supple, no meningismus   CV: heart with reg rhythm, no obvious murmur appreciated. radial pulses 2+ bilaterally, upper and lower extremities warm to the touch. mild ttp about right anterior axillary line just lateral to nipple, no crepitus  Resp: clear to auscultation anteriorly bilaterally, breathing comfortably  Abd: soft, NT, ND  Extremities: ankles warm to the touch, no appreciable ankle edema bilaterally  Pysch: appropriate affect    Neuro: moves all extremities spontaneously     nontender midline spine, nontender extremities

## 2022-07-11 NOTE — ED PROVIDER NOTE - PATIENT PORTAL LINK FT
You can access the FollowMyHealth Patient Portal offered by [facility] by registering at the following website: [email or web address]. By joining [employer]’s FollowMyHealth portal, you will also be able to view your health information using other applications (apps) compatible with our system.

## 2022-07-11 NOTE — ED PROVIDER NOTE - NS ED ATTENDING STATEMENT MOD
This was a shared visit with the [facility]. I reviewed and verified the documentation and independently performed the documented:

## 2022-07-11 NOTE — ED PROVIDER NOTE - RAPID ASSESSMENT
57 y/o M w/ PMHx  DM, HTN, BPH and pancreatitis presents to the ED c/o R sided chest pain worse w/ inspiration. Pt reports mechanical trip and fall over the curb today onto R side, after which he developed R sided CP, R side pain and R wrist pain. +difficulty breathing. Pt states it feels like "someone is sticking a knife in my chest" every time he takes a breath. Denies any N/V or any other acute complaints. Nontoxic appearing. Appears uncomfortable. Speaking in full sentences.    IKia (Scribe) have documented this rapid assessment note under the dictation of Duane Kinney) which has been reviewed and affirmed to be accurate. Patient was seen as a QDOC patient. The patient will be seen and further worked up in the main emergency department and their care will be completed by the main emergency department team along with a thorough physical exam. Receiving team will follow up on labs, analgesia, any clinical imaging, reassess and disposition as clinically indicated, all decisions regarding the progression of care will be made at their discretion. 57 y/o M w/ PMHx  DM, HTN, BPH and pancreatitis presents to the ED c/o R sided chest pain worse w/ inspiration. Pt reports mechanical trip and fall over the curb today onto R side, after which he developed R sided CP, R side pain and R wrist pain. +difficulty breathing. Pt states it feels like "someone is sticking a knife in my chest" every time he takes a breath. Denies any N/V or any other acute complaints. Nontoxic appearing. Appears uncomfortable. Speaking in full sentences.    Attending Note --     I saw the patient waiting area via televideo connection; a brief history was taken and a thorough physical exam was not performed as there is no physical exam room available.  The patient will be seen and further worked up in the main emergency department and their care will be completed by the main emergency department team.  I was not involved in this patient's care during the QDOC process, and unless otherwise noted in the ED provider note, was not involved in their care during their ED course.    The scribe's documentation has been prepared under my direction and personally reviewed by me in its entirety. I confirm that the note above accurately reflects all work, treatment, procedures, and medical decision making performed by me (Dr. Kinney).  --[facility]     Kia MCKEON (Scribe) have documented this rapid assessment note under the dictation of Duane Kinney) which has been reviewed and affirmed to be accurate. Patient was seen as a QDOC patient. The patient will be seen and further worked up in the main emergency department and their care will be completed by the main emergency department team along with a thorough physical exam. Receiving team will follow up on labs, analgesia, any clinical imaging, reassess and disposition as clinically indicated, all decisions regarding the progression of care will be made at their discretion.

## 2022-07-11 NOTE — ED PROVIDER NOTE - OBJECTIVE STATEMENT
56M presenting with right anterior chest pain with deep inspiration after fall onto his right side. The patient states he fell when he tripped on uneven sidewalk around 7pm. No head strike or LOC. Pain is localized to right anterior chest wall, sharp. No dyspnea. Denies headache nausea or vomiting. Has not taken anything for pain yet. He has associated right low back pain. No numbness or tingling of extremities. Ambulatory since incident.

## 2022-07-11 NOTE — ED PROVIDER NOTE - NSFOLLOWUPINSTRUCTIONS_ED_ALL_ED_FT
Follow-up with your primary care doctor within 1 week to discuss your ER visit and symptoms.    The CT scan of your chest reported no rib fractures but you may still have pain from a rib contusion.    Take Ibuprofen as prescribed. Take this medication with food.    Take Percocet as prescribed. Do not drink alcohol or drive while taking this medication as it can make you drowsy.    Perform incentive spirometry exercises every 10 minutes when at rest.    Seek medical attention if your symptoms worsen or do not improve.

## 2022-07-11 NOTE — ED ADULT TRIAGE NOTE - CHIEF COMPLAINT QUOTE
Pt reports mechanical trip and fall trip over curb. Now reporting R sided chest pain worse on inspiration and R wrist pain. Denies head strike, LOC.

## 2022-07-11 NOTE — ED PROVIDER NOTE - ATTENDING APP SHARED VISIT CONTRIBUTION OF CARE
Attending MD Salazar:   I personally have seen and examined this patient. I have performed a substantive portion of the visit including all aspects of the medical decision making.   Physician assistant note reviewed and agree on plan of care and except where noted.          56M presenting with severe right chest wall pain after a fall from standing 7pm tonight. No head injury, no LOC. Pain worse with deep inspiration. Exam notable for clear lungs b/l, some focal ttp about right anterior axillary line around rib 4 or 5, no crepitus. Nontender spine, nontender abdomen. CXR in triage without PTX or obvious displaced rib fracture. Possibility for occult rib fx's, will obtain CT chest to further assess. Pain is high up in rib cage, thus associated intra-abdominal injury is not suspected. Will provide analgesia, reassess      *The above represents an initial assessment/impression. Please refer to progress notes for potential changes in patient clinical course*

## 2022-07-12 VITALS
RESPIRATION RATE: 18 BRPM | HEART RATE: 70 BPM | OXYGEN SATURATION: 97 % | TEMPERATURE: 97 F | SYSTOLIC BLOOD PRESSURE: 161 MMHG | DIASTOLIC BLOOD PRESSURE: 77 MMHG

## 2022-07-12 PROCEDURE — 93005 ELECTROCARDIOGRAM TRACING: CPT

## 2022-07-12 PROCEDURE — 73110 X-RAY EXAM OF WRIST: CPT

## 2022-07-12 PROCEDURE — 99284 EMERGENCY DEPT VISIT MOD MDM: CPT | Mod: 25

## 2022-07-12 PROCEDURE — 71046 X-RAY EXAM CHEST 2 VIEWS: CPT

## 2022-07-12 PROCEDURE — 71250 CT THORAX DX C-: CPT | Mod: 26,MA

## 2022-07-12 PROCEDURE — 71250 CT THORAX DX C-: CPT | Mod: MA

## 2022-07-12 PROCEDURE — 71100 X-RAY EXAM RIBS UNI 2 VIEWS: CPT

## 2022-07-12 RX ORDER — IBUPROFEN 200 MG
1 TABLET ORAL
Qty: 21 | Refills: 0
Start: 2022-07-12 | End: 2022-07-18

## 2022-07-12 RX ADMIN — Medication 650 MILLIGRAM(S): at 00:07

## 2022-07-12 RX ADMIN — LAMOTRIGINE 225 MILLIGRAM(S): 25 TABLET, ORALLY DISINTEGRATING ORAL at 00:08

## 2022-07-12 RX ADMIN — OXYCODONE HYDROCHLORIDE 5 MILLIGRAM(S): 5 TABLET ORAL at 00:07

## 2022-07-12 RX ADMIN — Medication 400 MILLIGRAM(S): at 00:07

## 2022-07-12 RX ADMIN — LIDOCAINE 1 PATCH: 4 CREAM TOPICAL at 00:10

## 2022-07-12 NOTE — ED ADULT NURSE NOTE - OBJECTIVE STATEMENT
56y Male AOx4 presents to the ED s/p mechanical fall. Pt states he tripped on the sidewalk and landed on right side. Endorses hitting head, denies LOC, not on AC. Endorses pain at right chest area, right wrist pain, and some difficulty taking full breath. CHARLETTE Lopez at bedside, educated pt on incentive spirometer use, pt verbalized No obvious deformity or active bleeding noted. Denies N/V, fever/chills, SOB, chest pain. Spontaneous/unlabored respirations, speaking in full sentences. Side rails up, bed in lowest position, safety maintained.

## 2022-08-02 ENCOUNTER — EMERGENCY (EMERGENCY)
Facility: HOSPITAL | Age: 57
LOS: 1 days | Discharge: HOME | End: 2022-08-02
Attending: EMERGENCY MEDICINE
Payer: MEDICAID

## 2022-08-02 VITALS
HEART RATE: 87 BPM | WEIGHT: 184.97 LBS | TEMPERATURE: 98 F | RESPIRATION RATE: 18 BRPM | SYSTOLIC BLOOD PRESSURE: 140 MMHG | DIASTOLIC BLOOD PRESSURE: 78 MMHG | OXYGEN SATURATION: 98 % | HEIGHT: 74 IN

## 2022-08-02 VITALS
OXYGEN SATURATION: 99 % | SYSTOLIC BLOOD PRESSURE: 168 MMHG | DIASTOLIC BLOOD PRESSURE: 82 MMHG | RESPIRATION RATE: 16 BRPM | HEART RATE: 75 BPM | TEMPERATURE: 98 F

## 2022-08-02 DIAGNOSIS — Z98.89 OTHER SPECIFIED POSTPROCEDURAL STATES: Chronic | ICD-10-CM

## 2022-08-02 DIAGNOSIS — Z98.890 OTHER SPECIFIED POSTPROCEDURAL STATES: Chronic | ICD-10-CM

## 2022-08-02 LAB
ALBUMIN SERPL ELPH-MCNC: 4.8 G/DL — SIGNIFICANT CHANGE UP (ref 3.3–5)
ALP SERPL-CCNC: 68 U/L — SIGNIFICANT CHANGE UP (ref 40–120)
ALT FLD-CCNC: 43 U/L — SIGNIFICANT CHANGE UP (ref 10–45)
ANION GAP SERPL CALC-SCNC: 14 MMOL/L — SIGNIFICANT CHANGE UP (ref 5–17)
AST SERPL-CCNC: 30 U/L — SIGNIFICANT CHANGE UP (ref 10–40)
BASOPHILS # BLD AUTO: 0.06 K/UL — SIGNIFICANT CHANGE UP (ref 0–0.2)
BASOPHILS NFR BLD AUTO: 0.6 % — SIGNIFICANT CHANGE UP (ref 0–2)
BILIRUB SERPL-MCNC: 0.4 MG/DL — SIGNIFICANT CHANGE UP (ref 0.2–1.2)
BUN SERPL-MCNC: 14 MG/DL — SIGNIFICANT CHANGE UP (ref 7–23)
CALCIUM SERPL-MCNC: 10.8 MG/DL — HIGH (ref 8.4–10.5)
CHLORIDE SERPL-SCNC: 102 MMOL/L — SIGNIFICANT CHANGE UP (ref 96–108)
CO2 SERPL-SCNC: 25 MMOL/L — SIGNIFICANT CHANGE UP (ref 22–31)
CREAT SERPL-MCNC: 0.88 MG/DL — SIGNIFICANT CHANGE UP (ref 0.5–1.3)
EGFR: 100 ML/MIN/1.73M2 — SIGNIFICANT CHANGE UP
EOSINOPHIL # BLD AUTO: 0.04 K/UL — SIGNIFICANT CHANGE UP (ref 0–0.5)
EOSINOPHIL NFR BLD AUTO: 0.4 % — SIGNIFICANT CHANGE UP (ref 0–6)
GLUCOSE SERPL-MCNC: 109 MG/DL — HIGH (ref 70–99)
HCT VFR BLD CALC: 37.8 % — LOW (ref 39–50)
HGB BLD-MCNC: 12.7 G/DL — LOW (ref 13–17)
IMM GRANULOCYTES NFR BLD AUTO: 0.3 % — SIGNIFICANT CHANGE UP (ref 0–1.5)
LIDOCAIN IGE QN: 76 U/L — HIGH (ref 7–60)
LYMPHOCYTES # BLD AUTO: 2.17 K/UL — SIGNIFICANT CHANGE UP (ref 1–3.3)
LYMPHOCYTES # BLD AUTO: 22.2 % — SIGNIFICANT CHANGE UP (ref 13–44)
MCHC RBC-ENTMCNC: 31.4 PG — SIGNIFICANT CHANGE UP (ref 27–34)
MCHC RBC-ENTMCNC: 33.6 GM/DL — SIGNIFICANT CHANGE UP (ref 32–36)
MCV RBC AUTO: 93.6 FL — SIGNIFICANT CHANGE UP (ref 80–100)
MONOCYTES # BLD AUTO: 0.63 K/UL — SIGNIFICANT CHANGE UP (ref 0–0.9)
MONOCYTES NFR BLD AUTO: 6.4 % — SIGNIFICANT CHANGE UP (ref 2–14)
NEUTROPHILS # BLD AUTO: 6.86 K/UL — SIGNIFICANT CHANGE UP (ref 1.8–7.4)
NEUTROPHILS NFR BLD AUTO: 70.1 % — SIGNIFICANT CHANGE UP (ref 43–77)
NRBC # BLD: 0 /100 WBCS — SIGNIFICANT CHANGE UP (ref 0–0)
PLATELET # BLD AUTO: 177 K/UL — SIGNIFICANT CHANGE UP (ref 150–400)
POTASSIUM SERPL-MCNC: 4.2 MMOL/L — SIGNIFICANT CHANGE UP (ref 3.5–5.3)
POTASSIUM SERPL-SCNC: 4.2 MMOL/L — SIGNIFICANT CHANGE UP (ref 3.5–5.3)
PROT SERPL-MCNC: 7.2 G/DL — SIGNIFICANT CHANGE UP (ref 6–8.3)
RBC # BLD: 4.04 M/UL — LOW (ref 4.2–5.8)
RBC # FLD: 13.7 % — SIGNIFICANT CHANGE UP (ref 10.3–14.5)
SODIUM SERPL-SCNC: 141 MMOL/L — SIGNIFICANT CHANGE UP (ref 135–145)
WBC # BLD: 9.79 K/UL — SIGNIFICANT CHANGE UP (ref 3.8–10.5)
WBC # FLD AUTO: 9.79 K/UL — SIGNIFICANT CHANGE UP (ref 3.8–10.5)

## 2022-08-02 PROCEDURE — 85025 COMPLETE CBC W/AUTO DIFF WBC: CPT

## 2022-08-02 PROCEDURE — 96375 TX/PRO/DX INJ NEW DRUG ADDON: CPT

## 2022-08-02 PROCEDURE — 99285 EMERGENCY DEPT VISIT HI MDM: CPT

## 2022-08-02 PROCEDURE — 36415 COLL VENOUS BLD VENIPUNCTURE: CPT

## 2022-08-02 PROCEDURE — 74177 CT ABD & PELVIS W/CONTRAST: CPT | Mod: 26,MA

## 2022-08-02 PROCEDURE — 74177 CT ABD & PELVIS W/CONTRAST: CPT | Mod: MA

## 2022-08-02 PROCEDURE — 80053 COMPREHEN METABOLIC PANEL: CPT

## 2022-08-02 PROCEDURE — 83690 ASSAY OF LIPASE: CPT

## 2022-08-02 PROCEDURE — 96374 THER/PROPH/DIAG INJ IV PUSH: CPT | Mod: XU

## 2022-08-02 PROCEDURE — 99284 EMERGENCY DEPT VISIT MOD MDM: CPT | Mod: 25

## 2022-08-02 RX ORDER — ACETAMINOPHEN 500 MG
1000 TABLET ORAL ONCE
Refills: 0 | Status: COMPLETED | OUTPATIENT
Start: 2022-08-02 | End: 2022-08-02

## 2022-08-02 RX ORDER — ONDANSETRON 8 MG/1
4 TABLET, FILM COATED ORAL ONCE
Refills: 0 | Status: COMPLETED | OUTPATIENT
Start: 2022-08-02 | End: 2022-08-02

## 2022-08-02 RX ORDER — ONDANSETRON 8 MG/1
1 TABLET, FILM COATED ORAL
Qty: 15 | Refills: 0
Start: 2022-08-02 | End: 2022-08-06

## 2022-08-02 RX ADMIN — ONDANSETRON 4 MILLIGRAM(S): 8 TABLET, FILM COATED ORAL at 08:43

## 2022-08-02 RX ADMIN — Medication 400 MILLIGRAM(S): at 06:36

## 2022-08-02 NOTE — ED PROVIDER NOTE - ATTENDING CONTRIBUTION TO CARE
Patient is a 57 yo M DM, HTN, BPH, hx of pancreatitis here for 2 days of abdominal pain. Pain is worse with eating. Pain is left upper abdomen. He reports a few episodes of nbnb vomiting. Pain is worse with eating. No chest pain, shortness of breath. He has tried tylenol without relief. Patient states this feels similar to prior episode of pancreatitis.     VS noted  Gen. no acute distress, Non toxic   HEENT: EOMI, mmm  Lungs: CTAB/L no C/ W /R   CVS: RRR   Abd; Soft, ttp LUQ, XXXXX  Ext: no edema  Skin: no rash  Neuro AAOx3 non focal clear speech  a/p:   - Karsten KELLY Patient is a 55 yo M DM, HTN, BPH, hx of pancreatitis here for 2 days of abdominal pain. Pain is worse with eating. Pain is left upper abdomen. Pain started during the week in his back. Patient reported he thought this could be back pain because he was working around the house. He states he started to feel it in his abdomen 2 days ago. He reports a few episodes of nbnb vomiting. Pain is worse with eating. No chest pain, shortness of breath. He has tried tylenol without relief. Patient states this feels similar to prior episode of pancreatitis. He reports a strong family history of pancreatic cancer.     VS noted  Gen. no acute distress, Non toxic   HEENT: EOMI, mmm  Lungs: CTAB/L no C/ W /R   CVS: RRR   Abd; Soft, non-tender, non-distended  Ext: no edema  Skin: no rash  Neuro AAOx3 non focal clear speech  a/p: abdominal pain - abdominal exam benign, however patient previously admitted for ? acute pancreatitis. US from August 2021 showed no gallstones. Given vomiting and persistent pain, will check labs including lipase, CT A/P, u/a.   - Karsten KELLY

## 2022-08-02 NOTE — ED PROVIDER NOTE - PATIENT PORTAL LINK FT
You can access the FollowMyHealth Patient Portal offered by [facility] by registering at the following website: [email or web address]. By joining [employer]’s FollowMyHealth portal, you will also be able to view your health information using other applications (apps) compatible with our system.

## 2022-08-02 NOTE — ED PROVIDER NOTE - PROGRESS NOTE DETAILS
Reassessed patient after CT scan and start of IV Tylenol. Explained to patient CT results were negative with no signs of pancreatitis. Malika Gaxiola MD (PGY2): Lipase 76, not  3x the upper limit of normal. CT w/o signs of pancreatitis. Patient states that his abd pain is improved but does have some nausea. Will give zofran and po challenge. Zofran rx sent to pharmacy. Will give GI follow-up. Strict return precautions.

## 2022-08-02 NOTE — ED PROVIDER NOTE - NSFOLLOWUPCLINICS_GEN_ALL_ED_FT
Gastroenterology at [facility]  Gastroenterology  [address] [place] [address]  Phone: [phone]  Fax:     [facility] [place]  Gastroenterology  [address] [place] [address]  Phone: [phone]  Fax:

## 2022-08-02 NOTE — ED PROVIDER NOTE - PHYSICAL EXAMINATION
Physical Exam:    Gen: NAD, AOx3, non-toxic appearing, able to ambulate without assistance  Head: NCAT  HEENT: EOMI, PEERLA, normal conjunctiva, tongue midline, oral mucosa moist  Lung: CTAB, no respiratory distress, no wheezes/rhonchi/rales B/L, speaking in full sentences  CV: RRR, no murmurs, rubs or gallops  Abd: +TTP to LUQ, no guarding or rebound tenderness, no RLQ tenderness, bowel sounds in all 4 quadrants  MSK: no visible deformities, ROM normal in UE/LE, no back pain  Neuro: No focal sensory or motor deficits  Skin: Warm, well perfused, no rash, no leg swelling

## 2022-08-02 NOTE — ED PROVIDER NOTE - CLINICAL SUMMARY MEDICAL DECISION MAKING FREE TEXT BOX
57 y/o M PMHx of HTN, BPH, DM and hx of pancreatitis presenting with LUQ abdominal pain for 2 days associated with vomiting.     PE revealed LUQ tenderness. DDx include pancreatitis vs. diverticulitis   Plan for labs, pain control and CT abd pelvis 55 y/o M PMHx of HTN, BPH, DM and hx of pancreatitis presenting with LUQ abdominal pain for 2 days associated with vomiting.     PE revealed LUQ tenderness. DDx include pancreatitis vs. diverticulitis vs. gastritis vs. MSK pain secondary to recent fall   Plan for labs, pain control and CT abd pelvis. Dispo likely d/c if negative and pain controlled.

## 2022-08-02 NOTE — ED ADULT NURSE NOTE - NSIMPLEMENTINTERV_GEN_ALL_ED
Implemented All Fall Risk Interventions:  [place] to call system. Call bell, personal items and telephone within reach. Instruct patient to call for assistance. Room bathroom lighting operational. Non-slip footwear when patient is off stretcher. Physically safe environment: no spills, clutter or unnecessary equipment. Stretcher in lowest position, wheels locked, appropriate side rails in place. Provide visual cue, wrist band, yellow gown, etc. Monitor gait and stability. Monitor for mental status changes and reorient to person, place, and time. Review medications for side effects contributing to fall risk. Reinforce activity limits and safety measures with patient and family.

## 2022-08-02 NOTE — ED ADULT NURSE REASSESSMENT NOTE - NS ED NURSE REASSESS COMMENT FT1
0740 Pt in ER purple hallway rm 2.5  A&Ox4. IVL RACF intact without sx of infilt. c/o L flank pain and nausea.   aware. Mild pain relief from Ofirmev. Voiding well

## 2022-08-02 NOTE — ED PROVIDER NOTE - NS ED ROS FT
CONSTITUTIONAL: No fevers, no chills, no lightheadedness, no dizziness  EYES: no visual changes, no eye pain  EARS: no ear drainage, no ear pain, no change in hearing  NOSE: no nasal congestion  MOUTH/THROAT: no sore throat  CV: No chest pain, no palpitations  RESP: No SOB, no cough  GI: +abdominal pain, +vomiting   : no dysuria, no hematuria, no flank pain  MSK: +back pain, +neck pain   SKIN: no rashes  NEURO: no headache, no focal weakness, no decreased sensation/parasthesias

## 2022-08-02 NOTE — ED PROVIDER NOTE - NSFOLLOWUPINSTRUCTIONS_ED_ALL_ED_FT
Your diagnosis:    Discharge instructions:    - Please follow up with your Primary Care Doctor.    - Tylenol up to 650 mg every 8 hours as needed for pain and/or Motrin up to 600 mg every 6 hours as needed for pain. Take any prescribed medications as instructed:     - Others:    - Be sure to return to the ED if you develop new or worsening symptoms. Specific signs and symptoms to be vigilant of: fever or chills, chest pain, difficulty breathing, palpitations, loss of consciousness, headache, vision changes, slurred speech, difficulty swallowing or drooling, facial droop, weakness in the arms or legs, numbness or tingling, abdominal pain, nausea or vomiting, diarrhea, constipation, blood in the stool or urine, pain on urination, difficulty urinating.    To control your pain at home, you should take Ibuprofen 400 mg along with Tylenol 650mg-1000mg every 6 to 8 hours. Limit your maximum daily Tylenol from all sources to 4000mg. Be aware that many other medications contain acetaminophen which is also known as Tylenol. Taking Tylenol and Ibuprofen together has been shown to be more effective at relieving pain than taking them separately. These are both over the counter medications that you can  at your local pharmacy without a prescription. You need to respect all of the warnings on the bottles. You shouldn’t take these medications for more than a week without following up with your doctor. Both medications come with certain risks and side effects that you need to discuss with your doctor, especially if you are taking them for a prolonged period. Your diagnosis:    Discharge instructions:    - Please follow up with your Primary Care Doctor.    - Tylenol up to 650 mg every 8 hours as needed for pain and/or Motrin up to 600 mg every 6 hours as needed for pain. Take any prescribed medications as instructed:     - A prescription for zofran 4mg oral disintegrating tablet was sent to your pharmacy. Take one tablet every 8 hours as needed for nausea.     - You will receive a call to make an appointment with a Gastroenterology specialists. List of providers have also been given.     - Others:    - Be sure to return to the ED if you develop new or worsening symptoms. Specific signs and symptoms to be vigilant of: fever or chills, chest pain, difficulty breathing, palpitations, loss of consciousness, headache, vision changes, slurred speech, difficulty swallowing or drooling, facial droop, weakness in the arms or legs, numbness or tingling, abdominal pain, nausea or vomiting, diarrhea, constipation, blood in the stool or urine, pain on urination, difficulty urinating.

## 2022-08-02 NOTE — ED PROVIDER NOTE - OBJECTIVE STATEMENT
55 y/o M PMHx of HTN, BPH, DM and hx of pancreatitis presenting with LUQ abdominal pain for 2 days associated with vomiting. Patient states this pain feels similar to previous episode of pancreatitis. States his diet has been "off" lately due to a fall 3 weeks ago and that the pain is constant and worse with eating. States he has had a few episodes of nonbloody nonbilious vomiting yesterday but denies diarrhea. Denies any chest pain, shortness of breath, fever, chills, extremity weakness. States he has taken Tylenol OTC for some relief. Denies any recent travel or sick contacts. 57 y/o M PMHx of HTN, BPH, DM and hx of pancreatitis presenting with LUQ abdominal pain for 2 days associated with vomiting. Patient states this pain feels similar to previous episode of pancreatitis back in Sept 2021. States his diet has been "off" lately due to a fall 3 weeks ago and that the pain is constant and worse with eating. States he has had a few episodes of nonbloody nonbilious vomiting yesterday but denies diarrhea. Denies any chest pain, shortness of breath, fever, chills, extremity weakness. States he has taken Tylenol OTC for some relief. Denies any recent travel or sick contacts. Denies any hx of gallstones.

## 2022-08-02 NOTE — ED ADULT NURSE NOTE - OBJECTIVE STATEMENT
Pt is a 55 y/o male presenting to the ED c/o LUQ pain x1-2 days. Pt endorses feeling constant pain in LUQ that radiates to the back and is worse after eating. Pt states he has felt this pain before when having pancreatitis. Pt endorses nausea and vomiting prior to arrival, describes emesis as bile-like. PMH seizures, HTN, DM. Abdomen soft, non-distended, tender to LUQ. Pt is A&Ox3, follows commands, speaks coherently, sensory/motor function intact, VSS, NAD noted. Pt denies chest pain, SOB, diarrhea/constipation, hematuria, dysuria, fever, chills, weakness, headache, numbness/tingling at this time.

## 2022-08-22 ENCOUNTER — APPOINTMENT (OUTPATIENT)
Dept: GASTROENTEROLOGY | Facility: CLINIC | Age: 57
End: 2022-08-22

## 2022-09-03 ENCOUNTER — TRANSCRIPTION ENCOUNTER (OUTPATIENT)
Age: 57
End: 2022-09-03

## 2022-09-21 LAB
ALBUMIN SERPL ELPH-MCNC: 5 G/DL
ALP BLD-CCNC: 62 U/L
ALT SERPL-CCNC: 21 U/L
ANION GAP SERPL CALC-SCNC: 12 MMOL/L
AST SERPL-CCNC: 19 U/L
BASOPHILS # BLD AUTO: 0.05 K/UL
BASOPHILS NFR BLD AUTO: 0.7 %
BILIRUB DIRECT SERPL-MCNC: 0.1 MG/DL
BILIRUB INDIRECT SERPL-MCNC: 0.2 MG/DL
BILIRUB SERPL-MCNC: 0.3 MG/DL
BUN SERPL-MCNC: 14 MG/DL
CALCIUM SERPL-MCNC: 10 MG/DL
CHLORIDE SERPL-SCNC: 107 MMOL/L
CO2 SERPL-SCNC: 26 MMOL/L
CREAT SERPL-MCNC: 0.85 MG/DL
EGFR: 101 ML/MIN/1.73M2
EOSINOPHIL # BLD AUTO: 0.14 K/UL
EOSINOPHIL NFR BLD AUTO: 2.1 %
GLUCOSE SERPL-MCNC: 132 MG/DL
HCT VFR BLD CALC: 42.8 %
HGB BLD-MCNC: 14.2 G/DL
IMM GRANULOCYTES NFR BLD AUTO: 0.3 %
LYMPHOCYTES # BLD AUTO: 1.95 K/UL
LYMPHOCYTES NFR BLD AUTO: 28.6 %
MAN DIFF?: NORMAL
MCHC RBC-ENTMCNC: 32 PG
MCHC RBC-ENTMCNC: 33.2 GM/DL
MCV RBC AUTO: 96.4 FL
MONOCYTES # BLD AUTO: 0.43 K/UL
MONOCYTES NFR BLD AUTO: 6.3 %
NEUTROPHILS # BLD AUTO: 4.23 K/UL
NEUTROPHILS NFR BLD AUTO: 62 %
PLATELET # BLD AUTO: 208 K/UL
POTASSIUM SERPL-SCNC: 4.7 MMOL/L
PROT SERPL-MCNC: 7.1 G/DL
RBC # BLD: 4.44 M/UL
RBC # FLD: 14 %
SODIUM SERPL-SCNC: 146 MMOL/L
WBC # FLD AUTO: 6.82 K/UL

## 2022-09-28 ENCOUNTER — APPOINTMENT (OUTPATIENT)
Dept: NEUROLOGY | Facility: CLINIC | Age: 57
End: 2022-09-28

## 2022-09-28 VITALS
WEIGHT: 186 LBS | SYSTOLIC BLOOD PRESSURE: 112 MMHG | HEART RATE: 87 BPM | BODY MASS INDEX: 23.87 KG/M2 | HEIGHT: 74 IN | RESPIRATION RATE: 14 BRPM | DIASTOLIC BLOOD PRESSURE: 71 MMHG

## 2022-09-28 DIAGNOSIS — G56.03 CARPAL TUNNEL SYNDROM,BILATERAL UPPER LIMBS: ICD-10-CM

## 2022-09-28 DIAGNOSIS — G47.00 INSOMNIA, UNSPECIFIED: ICD-10-CM

## 2022-09-28 DIAGNOSIS — R41.9 UNSPECIFIED SYMPTOMS AND SIGNS INVOLVING COGNITIVE FUNCTIONS AND AWARENESS: ICD-10-CM

## 2022-09-28 PROCEDURE — 99213 OFFICE O/P EST LOW 20 MIN: CPT

## 2022-09-28 NOTE — DATA REVIEWED
[de-identified] : 2007 MRI neg\par 1/2017: MRI neg (OSH)\par 2/2018: MRI C spine: C3-5 C7 canal stenosis, compression [de-identified] : 2007, 2009, 2011 AEEG 48hr neg\par The patient had ambulatory EEG monitoring in April 2016.  This has shown a left temporal seizure on the random one day recording.   [de-identified] : EMG 3/2018: CTS bilat R>L, ulnar neuropathy on the L\par 20021 LTG 11 \par Labs:  9/22 bmp, lft, cbc  nl.  glu slighty elevated, nonfasting.
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
## 2022-09-28 NOTE — DISCUSSION/SUMMARY
[FreeTextEntry1] : Seizures since 2007 baseline was q1-5months, Likely underreported. 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  History of poor response to OXC, poor tolerability of VPA, PGB.  \par Doing better on LTG, no seizures since 2018\par \par Prior NL MRI.\par Stress, anxiety, mood issues, poor med tolerability, poor insight a factor barriers to his own care.\par Chronic pain issues.  Sleep deprivation, insomnia an issue. Poor appetite.\par Chronic memory complaints, focus and attentional issues, longstanding, nonprogressive.\par h/o TBIs/concussions.\par \par On LTG level 11 in 4/2/2021, on 225mg bid.\par (No prior vimpat, ZNS, TPM)  \par F/u LTG level, labs ncv\par \par -would benefit from a psychiatrist, psychologist. prev referred to [facility].  pt reluctant to take any antidepresaent.  Rec Stress reduction,  exercise.\par -Referred to sleep specialist in past.  (tried ambien in the past)\par -Physical therapy\par -spinal stenosis, f/u with Nsurg Dr Cortes.\par -s/p EMG for sensory disturbance of hands showed neuropathy: CTS: wear wrist guards at night\par -has resumed driving, no events since 2018\par \par total time 25min [Medically Refractory (seizure within the last year)] : Medically Refractory (seizure within the last year) [Risks Associated with Driving/[place] Law] : As per my usual protocol, the patient was advised in regards to risks and driving privileges associated with the [place] Guidelines.  [Safety Recommendations] : The patient was advised in regards to the risk of seizures and general seizure safety recommendations including not to be bathing alone, climbing to high places and operating heavy machinery. [Compliance with Medications] : The importance of compliance with medications was reinforced. [Medication Side Effects] : High frequency and serious potential medication adverse effects were reviewed with the patient, including but not exclusive to psychiatric effects.  Information sheets on medication side effects were made available to the patient in our clinic.  The patient or advocate agrees to notify us for any concerns. [Sleep Hygiene/Sleep Disruption Risks] : Sleep hygiene and the risks of sleep disruption were discussed. [Obtain Copies of Medical Records] : Patient was asked to obtain copies of pertinent prior medical records or studies [Mental Health Evaluation] : Mental health evaluation” was recommended with either our  and psychologist, at [facility] ([facility] ): [phone], or [facility] Intake: [phone]

## 2022-09-28 NOTE — PHYSICAL EXAM
[General Appearance - Alert] : alert [General Appearance - In No Acute Distress] : in no acute distress [Oriented To Time, Place, And Person] : oriented to person, place, and time [Impaired Insight] : insight and judgment were intact [Affect] : the affect was normal [Person] : oriented to person [Place] : oriented to place [Time] : oriented to time [Registration Intact] : recent registration memory intact [Concentration Intact] : normal concentrating ability [Visual Intact] : visual attention was ~T not ~L decreased [Naming Objects] : no difficulty naming common objects [Repeating Phrases] : no difficulty repeating a phrase [Writing A Sentence] : no difficulty writing a sentence [Fluency] : fluency intact [Comprehension] : comprehension intact [Reading] : reading intact [Past History] : adequate knowledge of personal past history [Cranial Nerves Optic (II)] : visual acuity intact bilaterally,  visual fields full to confrontation, pupils equal round and reactive to light [Cranial Nerves Oculomotor (III)] : extraocular motion intact [Cranial Nerves Trigeminal (V)] : facial sensation intact symmetrically [Cranial Nerves Facial (VII)] : face symmetrical [Cranial Nerves Vestibulocochlear (VIII)] : hearing was intact bilaterally [Cranial Nerves Glossopharyngeal (IX)] : tongue and palate midline [Cranial Nerves Accessory (XI - Cranial And Spinal)] : head turning and shoulder shrug symmetric [Cranial Nerves Hypoglossal (XII)] : there was no tongue deviation with protrusion [Motor Tone] : muscle tone was normal in all four extremities [Motor Strength] : muscle strength was normal in all four extremities [No Muscle Atrophy] : normal bulk in all four extremities [Motor Handedness Right-Handed] : the patient is right hand dominant [Sensation Tactile Decrease] : light touch was intact [Dysesthesia] : dysesthesia was present [Abnormal Walk] : normal gait [Balance] : balance was intact [Past-pointing] : there was no past-pointing [Tremor] : no tremor present [2+] : Triceps left 2+ [3+] : Patella right 3+ [1+] : Ankle jerk right 1+ [0] : Ankle jerk left 0 [Plantar Reflex Right Only] : normal on the right [Plantar Reflex Left Only] : normal on the left [FreeTextEntry4] : talkative, mood stable.  sequential numbers letters to 8H.  distractible. no apraxia. 1/3 recall [FreeTextEntry6] : CTS wrist surgery scars on RUE [FreeTextEntry7] : in fingertips bilat [Sclera] : the sclera and conjunctiva were normal [Outer Ear] : the ears and nose were normal in appearance [Neck Appearance] : the appearance of the neck was normal [Apical Impulse] : the apical impulse was normal [Heart Rate And Rhythm] : heart rate was normal and rhythm regular [Abdomen Tenderness] : non-tender [Nail Clubbing] : no clubbing  or cyanosis of the fingernails [] : no rash [Skin Lesions] : no skin lesions

## 2022-09-28 NOTE — CONSULT LETTER
[Dear  ___] : Dear  [unfilled], [Consult Letter:] : I had the pleasure of evaluating your patient, [unfilled]. [( Thank you for referring [unfilled] for consultation for _____ )] : Thank you for referring [unfilled] for consultation for [unfilled] [Please see my note below.] : Please see my note below. [Consult Closing:] : Thank you very much for allowing me to participate in the care of this patient.  If you have any questions, please do not hesitate to contact me. [Sincerely,] : Sincerely, [FreeTextEntry2] : Dr. Peter Farrell\par [address] [place] [address] [Santo Boland MD] : Santo Boland MD [Attending, Dept. of Neurology, Division of Epilepsy] : Attending, Dept. of Neurology, Division of Epilepsy [[facility]] : [facility] [ of Neurology] :  of Neurology

## 2022-09-29 NOTE — DISCHARGE NOTE PROVIDER - NSRESEARCHGRANT_PROPHYLAXISRECOMFT_GEN_A_CORE
Debridement Performed for Assessment: Wound # 1  Performed By: Provider;Felisha Love NP  Assistant: Cici Duvall LPN    Debridement: Chemical/enzymatic  Debridement Description: Non-Selective      Wound/Ulcer size:    Length: 2.6 cm  Width:  2.0 cm  Depth: 0.3 cm  Cm2:  5.2 cm    Photo taken post procedure:  yes  Procedural Pain: Insensate  Post Procedural Pain: Insensate  Response to Treatment: Procedure was tolerated well    Specimen  Was a specimen collected?  No Specimen collected      Graft or Implant Aplpied  Was a graft of implant applied?  No      Post deridement diagnosis  chronic right diabetic foot ulcer  Did the post debridment diagnosis chagne?  The post debridment diagnosis is the same as the pre-op diagnosis.      Assistant of procedure  Who assisted with the procedure?  The assistant was the same as the nurse listed above.      Complication  Complications related to debridement?  No complications noted      Estimated Blood Loss  How much blood loss occured?  No blood loss      Anesthesia  Anesthesia used?  None Debridement Performed for Assessment: Wound # 1  Estimated Blood Loss  How much blood loss occured?  No blood loss      Anesthesia  Anesthesia used?  None      No post-discharge thromboprophylaxis indicated.

## 2022-10-05 ENCOUNTER — APPOINTMENT (OUTPATIENT)
Dept: ORTHOPEDIC SURGERY | Facility: CLINIC | Age: 57
End: 2022-10-05

## 2022-12-31 NOTE — ED ADULT TRIAGE NOTE - HEART RATE (BEATS/MIN)
Bedside and Verbal shift change report given to [address] (oncoming nurse) by Channing Salomon RN (offgoing nurse). Report included the following information SBAR, Kardex, ED Summary, Intake/Output, MAR, Recent Results, and Med Rec Status. 73

## 2023-01-09 ENCOUNTER — APPOINTMENT (OUTPATIENT)
Dept: SPINE | Facility: CLINIC | Age: 58
End: 2023-01-09

## 2023-01-12 ENCOUNTER — APPOINTMENT (OUTPATIENT)
Dept: SPINE | Facility: CLINIC | Age: 58
End: 2023-01-12
Payer: MEDICAID

## 2023-01-12 ENCOUNTER — NON-APPOINTMENT (OUTPATIENT)
Age: 58
End: 2023-01-12

## 2023-01-12 VITALS
HEIGHT: 74 IN | WEIGHT: 183 LBS | BODY MASS INDEX: 23.49 KG/M2 | HEART RATE: 75 BPM | OXYGEN SATURATION: 95 % | DIASTOLIC BLOOD PRESSURE: 73 MMHG | SYSTOLIC BLOOD PRESSURE: 147 MMHG

## 2023-01-12 PROCEDURE — 99213 OFFICE O/P EST LOW 20 MIN: CPT

## 2023-01-13 NOTE — DATA REVIEWED
[de-identified] : Cervical spine and Thoracic spine from [facility] on 8/4/2022 [de-identified] : EMG from Dr. Amador Buck on 10/3/2022.

## 2023-01-13 NOTE — PHYSICAL EXAM
[General Appearance - Alert] : alert [General Appearance - In No Acute Distress] : in no acute distress [3+] : Ankle jerk left 3+ [Normal] : normal [Motor Strength] : muscle strength was normal in all four extremities [Sensation Tactile Decrease] : light touch was intact [Abnormal Walk] : normal gait [Stevens] : Stevens's sign was not demonstrated [L'Hermitte's] : neck flexion did not produce tingling down the spine/arms [Spurling's - Opposite Side] : Negative Spurling's on opposite side [Spurling's Same Side] : Negative Spurling's on same side

## 2023-01-13 NOTE — REASON FOR VISIT
[Other: _____] : [unfilled] [FreeTextEntry1] : Mr. Ramos is a 57 year old gentleman who underwent a C5-C6 and C6-C7 ACDF on 10/03/2012 and  L4-L5 decompression, diskectomy and interbody fusion with posterior instrumentation on 07/14/2020.   A lumbar CT done on 04/01/2021 revealed minimally displaced fractures of both L5 screws at the level of the junction of the paddle and the vertebral body. A CT scan done at [facility] revealed solid fusion across the L4-L5 segment. 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 He has been experiencing neck pain with radiation into the right shoulder down the right arm since the fall. He also reports numbness in the right thumb and weakness of bilateral hands. Pain level is 10/10. He states he does not take any pain medication. He did multiple sessions of physical therapy with no improvement. Denies any bladder or bowel dysfunction.  Cervical MRI scan from August showed some progression in the C4-5 stenosis.  There were also multiple thoracic herniations noted.  He has no symptoms in the lower extremities.\par \par \par

## 2023-01-13 NOTE — ASSESSMENT
[FreeTextEntry1] : 57 year old male 10 years status post C5-C7 ACDF and 2.5 years status post L4-L5 lumbar fusion. MRI of cervical spine shows some progression of junctional stenosis at C4-5. The patient will like to pursue pain management and physical therapy at this time. Referred to Dr. Melvin Murray for pain management. He is aware that if he has any progression of his symptoms that surgical intervention will be strongly recommended. He will return 2 months for a follow up. He will return sooner if he develops any worsening.

## 2023-02-10 ENCOUNTER — APPOINTMENT (OUTPATIENT)
Dept: PAIN MANAGEMENT | Facility: CLINIC | Age: 58
End: 2023-02-10
Payer: MEDICAID

## 2023-02-10 VITALS — HEIGHT: 74 IN | BODY MASS INDEX: 23.49 KG/M2 | WEIGHT: 183 LBS

## 2023-02-10 PROCEDURE — 99204 OFFICE O/P NEW MOD 45 MIN: CPT

## 2023-02-10 NOTE — DISCUSSION/SUMMARY
[de-identified] : After discussing various treatment options with the patient including but not limited to oral medications, physical therapy, exercise modalities as well as interventional spinal injections, we have decided with the following plan:\par \par - Continue Home exercises, stretching, activity modification, physical therapy, and conservative care.\par - MRI report and/or images was reviewed and discussed with the patient.\par - Recommend C7-T1 Cervical Epidural Steroid Injection under fluoroscopic guidance with image.\par - The risks, benefits and alternatives of the proposed procedure were explained in detail with the patient. The risks outlined include but are not limited to infection, bleeding, post-dural puncture headache, nerve injury, a temporary increase in pain, failure to resolve symptoms, allergic reaction, symptom recurrence, and possible elevation of blood sugar in diabetics. All questions were answered to patient's apparent satisfaction and he/she verbalized an understanding.\par - Patient is presenting with acute/sub-acute radicular pain with impairment in ADLs and functionality.  The pain has not responded to conservative care including NSAID therapy and/or physical therapy.  There is no bleeding tendency, unstable medical condition, or systemic infection.\par - Follow up in 1-2 weeks post injection for re-evaluation.\par *pt takes 81mg Aspirin\par

## 2023-02-10 NOTE — HISTORY OF PRESENT ILLNESS
[Neck] : neck [Right Arm] : right arm [7] : 7 [Shooting] : shooting [Stabbing] : stabbing [Constant] : constant [Household chores] : household chores [Leisure] : leisure [Rest] : rest [Bending forward] : bending forward [Extending back] : extending back [FreeTextEntry1] : Initial HPI 02/10/2023:\par Pain started July 2022 and is on the right side of the neck and radiates to the right shoulder and down the right arm to the fingers described as a sharp shooting pain with associated numbness and tingling. Pain also radiates down the left arm but not as bad as the right. Saw Dr. Cortes who recommended pain mgt and if pain worsens would need surgery. \par \par MRI Cervical Spine 8/4/22: C4-5 right HNP with impingement on he cord; C5-6, C6-7 NF stenosis \par Conservative Care: started PT \par Pain Medications: none \par Past Injections: ESIs over a year ago in the lumbar spine \par Spine surgery: C5-6 ACDF; L4-5 lumbar fusion\par Blood thinners: *pt takes 81mg Aspirin\par \par  [] : no [FreeTextEntry7] : rt shoulder

## 2023-02-10 NOTE — PHYSICAL EXAM
[de-identified] : Constitutional; Appears well, no apparent distress\par Ability to communicate: Normal \par Respiratory: non-labored breathing\par Skin: No rash noted\par Head: Normocephalic, atraumatic\par Neck: no visible thyroid enlargement\par Eyes: Extraocular movements intact\par Neurologic: Alert and oriented x3\par Psychiatric: normal mood, affect and behavior \par \par  [] : positive Spurling

## 2023-02-27 ENCOUNTER — APPOINTMENT (OUTPATIENT)
Dept: ORTHOPEDIC SURGERY | Facility: CLINIC | Age: 58
End: 2023-02-27
Payer: MEDICAID

## 2023-02-27 VITALS
DIASTOLIC BLOOD PRESSURE: 70 MMHG | SYSTOLIC BLOOD PRESSURE: 121 MMHG | BODY MASS INDEX: 23.23 KG/M2 | HEART RATE: 68 BPM | OXYGEN SATURATION: 97 % | WEIGHT: 181 LBS | HEIGHT: 74 IN

## 2023-02-27 PROCEDURE — 20610 DRAIN/INJ JOINT/BURSA W/O US: CPT | Mod: RT

## 2023-02-27 PROCEDURE — 99214 OFFICE O/P EST MOD 30 MIN: CPT | Mod: 25

## 2023-03-10 ENCOUNTER — APPOINTMENT (OUTPATIENT)
Dept: ORTHOPEDIC SURGERY | Facility: CLINIC | Age: 58
End: 2023-03-10
Payer: MEDICAID

## 2023-03-10 PROCEDURE — 99214 OFFICE O/P EST MOD 30 MIN: CPT | Mod: 25

## 2023-03-10 PROCEDURE — 20611 DRAIN/INJ JOINT/BURSA W/US: CPT | Mod: LT

## 2023-03-13 NOTE — REASON FOR VISIT
[Follow-Up: _____] : a [unfilled] follow-up visit [Other: _____] : [unfilled] [FreeTextEntry1] : Mr. Ramos is a 57 year old gentleman who underwent a C5-C6 and C6-C7 ACDF on 10/03/2012 and L4-L5 decompression, diskectomy and interbody fusion with posterior instrumentation on 07/14/2020. A lumbar CT done on 04/01/2021 revealed minimally displaced fractures of both L5 screws at the level of the junction of the paddle and the vertebral body. A CT scan done at [facility] revealed solid fusion across the L4-L5 segment. 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 He has been experiencing neck pain with radiation into the right shoulder down the right arm since the fall. During office visit on 1/12/2023 he reported numbness in the right thumb and weakness of bilateral hands. Pain level was 10/10.  Pain refractory to multiple sessions of physical therapy. Denies any bladder or bowel dysfunction. Cervical MRI scan from August showed some progression in the C4-5 stenosis. There were also multiple thoracic herniations noted. He has no symptoms in the lower extremities.\par \par Today he reports \par Pain management

## 2023-03-13 NOTE — ASSESSMENT
[FreeTextEntry1] : 57 year old male 10 years status post C5-C7 ACDF and 2.5 years status post L4-L5 lumbar fusion. MRI of cervical spine shows some progression of junctional stenosis at C4-5.

## 2023-03-16 ENCOUNTER — APPOINTMENT (OUTPATIENT)
Dept: SPINE | Facility: CLINIC | Age: 58
End: 2023-03-16

## 2023-04-28 ENCOUNTER — APPOINTMENT (OUTPATIENT)
Dept: NEUROLOGY | Facility: CLINIC | Age: 58
End: 2023-04-28

## 2023-05-08 ENCOUNTER — APPOINTMENT (OUTPATIENT)
Dept: ORTHOPEDIC SURGERY | Facility: CLINIC | Age: 58
End: 2023-05-08
Payer: MEDICAID

## 2023-05-08 DIAGNOSIS — M25.519 PAIN IN UNSPECIFIED SHOULDER: ICD-10-CM

## 2023-05-08 PROCEDURE — 99214 OFFICE O/P EST MOD 30 MIN: CPT | Mod: 25

## 2023-05-08 PROCEDURE — 20610 DRAIN/INJ JOINT/BURSA W/O US: CPT | Mod: RT

## 2023-05-14 PROBLEM — M25.519 SHOULDER PAIN: Status: ACTIVE | Noted: 2022-03-27

## 2023-06-06 ENCOUNTER — APPOINTMENT (OUTPATIENT)
Dept: NEUROLOGY | Facility: CLINIC | Age: 58
End: 2023-06-06
Payer: MEDICAID

## 2023-06-06 VITALS
SYSTOLIC BLOOD PRESSURE: 118 MMHG | BODY MASS INDEX: 23.36 KG/M2 | DIASTOLIC BLOOD PRESSURE: 71 MMHG | HEART RATE: 67 BPM | WEIGHT: 182 LBS | HEIGHT: 74 IN

## 2023-06-06 DIAGNOSIS — Z51.81 ENCOUNTER FOR THERAPEUTIC DRUG LVL MONITORING: ICD-10-CM

## 2023-06-06 DIAGNOSIS — F09 UNSPECIFIED MENTAL DISORDER DUE TO KNOWN PHYSIOLOGICAL CONDITION: ICD-10-CM

## 2023-06-06 PROCEDURE — 99213 OFFICE O/P EST LOW 20 MIN: CPT
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
## 2023-06-06 NOTE — CONSULT LETTER
[Dear  ___] : Dear  [unfilled], [Consult Letter:] : I had the pleasure of evaluating your patient, [unfilled]. [( Thank you for referring [unfilled] for consultation for _____ )] : Thank you for referring [unfilled] for consultation for [unfilled] [Please see my note below.] : Please see my note below. [Consult Closing:] : Thank you very much for allowing me to participate in the care of this patient.  If you have any questions, please do not hesitate to contact me. [Sincerely,] : Sincerely, [Santo Boland MD] : Santo Boland MD [Attending, Dept. of Neurology, Division of Epilepsy] : Attending, Dept. of Neurology, Division of Epilepsy [[facility]] : [facility] [ of Neurology] :  of Neurology [FreeTextEntry2] : Dr. Peter Farrell\par [address] [place] [address]

## 2023-06-06 NOTE — DISCUSSION/SUMMARY
[Medically Refractory (seizure within the last year)] : Medically Refractory (seizure within the last year) [Risks Associated with Driving/[place] Law] : As per my usual protocol, the patient was advised in regards to risks and driving privileges associated with the [place] Guidelines.  [Safety Recommendations] : The patient was advised in regards to the risk of seizures and general seizure safety recommendations including not to be bathing alone, climbing to high places and operating heavy machinery. [Compliance with Medications] : The importance of compliance with medications was reinforced. [Medication Side Effects] : High frequency and serious potential medication adverse effects were reviewed with the patient, including but not exclusive to psychiatric effects.  Information sheets on medication side effects were made available to the patient in our clinic.  The patient or advocate agrees to notify us for any concerns. [Sleep Hygiene/Sleep Disruption Risks] : Sleep hygiene and the risks of sleep disruption were discussed. [Obtain Copies of Medical Records] : Patient was asked to obtain copies of pertinent prior medical records or studies [Mental Health Evaluation] : Mental health evaluation” was recommended with either our  and psychologist, at [facility] ([facility] ): [phone], or [facility] Intake: [phone] [FreeTextEntry1] : Seizures since 2007 baseline was q1-5months. 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  History of poor response to OXC, poor tolerability of VPA, PGB.  \par Doing much better on LTG, no seizures since 2018\par \par Prior NL MRI.\par Stress, anxiety, mood issues, poor med tolerability, poor insight a factor barriers to his own care.\par Chronic pain issues.  Sleep deprivation, insomnia an issue. Poor appetite.\par Chronic memory complaints, focus and attentional issues, longstanding, nonprogressive. Maybe related to epilepsy, depression.\par h/o TBIs/concussions.\par \par On LTG level 11 in 4/2/2021, on 225mg bid.\par (No prior vimpat, ZNS, TPM)  \par F/u LTG level, labs ncv\par \par -would benefit from a psychiatrist, psychologist. prev referred to [facility].  pt reluctant to take any antidepresaent.  Rec Stress reduction,  exercise.\par -Referred to sleep specialist in past.  (tried ambien in the past)\par -Physical therapy, seeing PMR\par -spinal stenosis, f/u with Nsurg Dr Cortes.\par -s/p EMG for sensory disturbance of hands showed neuropathy: CTS: wear wrist guards at night\par -has resumed driving, no events since 2018\par \par total time x25min

## 2023-06-06 NOTE — DATA REVIEWED
[de-identified] : 2007 MRI neg\par 1/2017: MRI neg (OSH) NL\par 2/2018: MRI C spine: C3-5 C7 canal stenosis, compression [de-identified] : 2007, 2009, 2011 AEEG 48hr neg\par The patient had ambulatory EEG monitoring in April 2016.  This has shown a left temporal seizure on the random one day recording.   [de-identified] : EMG 3/2018: CTS bilat R>L, ulnar neuropathy on the L\par 20021 LTG 11 \par Labs:  9/22 bmp, lft, cbc  nl.  glu slighty elevated, nonfasting.

## 2023-06-08 ENCOUNTER — APPOINTMENT (OUTPATIENT)
Dept: ORTHOPEDIC SURGERY | Facility: CLINIC | Age: 58
End: 2023-06-08

## 2023-07-12 NOTE — PACU DISCHARGE NOTE - AIRWAY PATENCY:
Initiate Treatment: Valtrex PRN Satisfactory Render In Strict Bullet Format?: No Detail Level: Zone Detail Level: Zone Photo Preface (Leave Blank If You Do Not Want): Photographs were obtained today

## 2023-07-18 NOTE — ED ADULT TRIAGE NOTE - HEIGHT IN FEET
6
PAST MEDICAL HISTORY:  CAD (coronary artery disease)     Chronic atrial fibrillation     COPD (chronic obstructive pulmonary disease)     Diabetes mellitus, type 2     Former smoker     Gout     HLD (hyperlipidemia)     HTN (hypertension)     Pericardial effusion     Severe aortic stenosis     TR (tricuspid regurgitation)

## 2023-07-28 ENCOUNTER — APPOINTMENT (OUTPATIENT)
Dept: ORTHOPEDIC SURGERY | Facility: CLINIC | Age: 58
End: 2023-07-28
Payer: MEDICAID

## 2023-07-28 VITALS — WEIGHT: 200 LBS | HEIGHT: 74 IN | BODY MASS INDEX: 25.67 KG/M2

## 2023-07-28 PROCEDURE — 20611 DRAIN/INJ JOINT/BURSA W/US: CPT | Mod: RT

## 2023-07-28 PROCEDURE — 99214 OFFICE O/P EST MOD 30 MIN: CPT | Mod: 25

## 2023-08-04 NOTE — ASSESSMENT
[FreeTextEntry1] : 57 year old male 10 years status post C5-C7 ACDF and 2.5 years status post L4-L5 lumbar fusion. 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

## 2023-08-04 NOTE — REASON FOR VISIT
[Follow-Up: _____] : a [unfilled] follow-up visit [FreeTextEntry1] : Mr. Ramos is a 57 year old gentleman who underwent a C5-C6 and C6-C7 ACDF on 10/03/2012 and L4-L5 decompression, diskectomy and interbody fusion with posterior instrumentation on 07/14/2020. A lumbar CT done on 04/01/2021 revealed minimally displaced fractures of both L5 screws at the level of the junction of the paddle and the vertebral body. [place] [facility] [place] across the L4-L5 segment. 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 The patient began experiencing neck pain with radiation into the right shoulder down the right arm with numbness in the right thumb and bilateral  weakness after he fell on the sidewalk landing on his right arm on 8/2/2022. Pain refractory to multiple sessions of physical therapy with no improvement. Cervical MRI scan from August 2022 showed some progression in the C4-5 stenosis. There were also multiple thoracic herniations noted.  Today he reports  Dr. Melvin Murray for pain management??

## 2023-08-07 ENCOUNTER — APPOINTMENT (OUTPATIENT)
Dept: SPINE | Facility: CLINIC | Age: 58
End: 2023-08-07

## 2023-08-16 ENCOUNTER — APPOINTMENT (OUTPATIENT)
Dept: ORTHOPEDIC SURGERY | Facility: CLINIC | Age: 58
End: 2023-08-16

## 2023-08-29 ENCOUNTER — APPOINTMENT (OUTPATIENT)
Dept: ORTHOPEDIC SURGERY | Facility: CLINIC | Age: 58
End: 2023-08-29
Payer: MEDICAID

## 2023-08-29 PROCEDURE — 20611 DRAIN/INJ JOINT/BURSA W/US: CPT | Mod: LT

## 2023-08-29 PROCEDURE — 99214 OFFICE O/P EST MOD 30 MIN: CPT | Mod: 25

## 2023-09-08 NOTE — REASON FOR VISIT
[Follow-Up: _____] : a [unfilled] follow-up visit [FreeTextEntry1] : Mr. Ramos is a 57 year old gentleman who underwent a C5-C6 and C6-C7 ACDF on 10/03/2012 and L4-L5 lumbar fusion on 07/14/2020. A lumbar CT done on 04/01/2021 revealed minimally displaced fractures of both L5 screws at the level of the junction of the paddle and the vertebral body. A CT scan done at [facility] [place] revealed solid fusion across the L4-L5 segment. 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  The patient tripped and fell on the sidewalk landing on his right arm one year ago. He was last seen in 1/12/2023 with complaint of neck pain with radiation into the right shoulder down the right arm since the fall. He also reported numbness in the right thumb and weakness of bilateral hands. Pain refractory to multiple sessions of physical therapy. Cervical MRI scan (08/22 )showed some progression in the C4-5 stenosis, multiple thoracic herniations noted. He denied any symptoms in the lower extremities. He was reviewed to pain management. It was discussed with the patient if he has any progression of his symptoms that surgical intervention will be strongly recommended.  Today he reports

## 2023-09-11 ENCOUNTER — RESULT REVIEW (OUTPATIENT)
Age: 58
End: 2023-09-11

## 2023-09-11 ENCOUNTER — APPOINTMENT (OUTPATIENT)
Dept: SPINE | Facility: CLINIC | Age: 58
End: 2023-09-11
Payer: MEDICAID

## 2023-09-11 VITALS
BODY MASS INDEX: 25.67 KG/M2 | SYSTOLIC BLOOD PRESSURE: 108 MMHG | HEIGHT: 74 IN | OXYGEN SATURATION: 96 % | HEART RATE: 71 BPM | WEIGHT: 200 LBS | DIASTOLIC BLOOD PRESSURE: 66 MMHG

## 2023-09-11 DIAGNOSIS — M54.12 RADICULOPATHY, CERVICAL REGION: ICD-10-CM

## 2023-09-11 PROCEDURE — 99213 OFFICE O/P EST LOW 20 MIN: CPT

## 2023-09-11 RX ORDER — DICLOFENAC SODIUM 50 MG/1
50 TABLET, DELAYED RELEASE ORAL
Qty: 28 | Refills: 0 | Status: DISCONTINUED | COMMUNITY
Start: 2023-05-08 | End: 2023-09-11

## 2023-09-11 RX ORDER — LOSARTAN POTASSIUM 50 MG/1
50 TABLET, FILM COATED ORAL
Qty: 30 | Refills: 0 | Status: DISCONTINUED | COMMUNITY
Start: 2021-09-01 | End: 2023-09-11

## 2023-09-19 ENCOUNTER — APPOINTMENT (OUTPATIENT)
Dept: ORTHOPEDIC SURGERY | Facility: CLINIC | Age: 58
End: 2023-09-19

## 2023-09-28 ENCOUNTER — APPOINTMENT (OUTPATIENT)
Dept: GASTROENTEROLOGY | Facility: CLINIC | Age: 58
End: 2023-09-28
Payer: MEDICAID

## 2023-09-28 VITALS
BODY MASS INDEX: 25.81 KG/M2 | DIASTOLIC BLOOD PRESSURE: 78 MMHG | WEIGHT: 201 LBS | SYSTOLIC BLOOD PRESSURE: 116 MMHG | OXYGEN SATURATION: 97 % | HEART RATE: 76 BPM

## 2023-09-28 DIAGNOSIS — K85.90 ACUTE PANCREATITIS WITHOUT NECROSIS OR INFECTION, UNSPECIFIED: ICD-10-CM

## 2023-09-28 PROCEDURE — 36415 COLL VENOUS BLD VENIPUNCTURE: CPT

## 2023-09-28 PROCEDURE — 99214 OFFICE O/P EST MOD 30 MIN: CPT | Mod: 25

## 2023-09-29 LAB
ALBUMIN SERPL ELPH-MCNC: 4.8 G/DL
ALP BLD-CCNC: 57 U/L
ALT SERPL-CCNC: 37 U/L
AMYLASE/CREAT SERPL: 104 U/L
ANION GAP SERPL CALC-SCNC: 13 MMOL/L
AST SERPL-CCNC: 26 U/L
BILIRUB SERPL-MCNC: 0.4 MG/DL
BUN SERPL-MCNC: 18 MG/DL
CALCIUM SERPL-MCNC: 10.5 MG/DL
CHLORIDE SERPL-SCNC: 103 MMOL/L
CHOLEST SERPL-MCNC: 146 MG/DL
CO2 SERPL-SCNC: 24 MMOL/L
CREAT SERPL-MCNC: 0.96 MG/DL
EGFR: 92 ML/MIN/1.73M2
ESTIMATED AVERAGE GLUCOSE: 131 MG/DL
GLUCOSE SERPL-MCNC: 122 MG/DL
HBA1C MFR BLD HPLC: 6.2 %
HDLC SERPL-MCNC: 54 MG/DL
LDLC SERPL CALC-MCNC: 64 MG/DL
LPL SERPL-CCNC: 56 U/L
NONHDLC SERPL-MCNC: 92 MG/DL
POTASSIUM SERPL-SCNC: 4.6 MMOL/L
PROT SERPL-MCNC: 7.1 G/DL
SODIUM SERPL-SCNC: 140 MMOL/L
TRIGL SERPL-MCNC: 163 MG/DL

## 2023-10-04 ENCOUNTER — APPOINTMENT (OUTPATIENT)
Dept: RADIOLOGY | Facility: CLINIC | Age: 58
End: 2023-10-04

## 2023-10-04 ENCOUNTER — APPOINTMENT (OUTPATIENT)
Dept: MRI IMAGING | Facility: CLINIC | Age: 58
End: 2023-10-04

## 2023-10-18 ENCOUNTER — APPOINTMENT (OUTPATIENT)
Dept: RADIOLOGY | Facility: IMAGING CENTER | Age: 58
End: 2023-10-18
Payer: MEDICAID

## 2023-10-18 ENCOUNTER — OUTPATIENT (OUTPATIENT)
Dept: OUTPATIENT SERVICES | Facility: HOSPITAL | Age: 58
LOS: 1 days | End: 2023-10-18
Payer: MEDICAID

## 2023-10-18 DIAGNOSIS — M54.12 RADICULOPATHY, CERVICAL REGION: ICD-10-CM

## 2023-10-18 DIAGNOSIS — Z98.890 OTHER SPECIFIED POSTPROCEDURAL STATES: Chronic | ICD-10-CM

## 2023-10-18 PROCEDURE — 72052 X-RAY EXAM NECK SPINE 6/>VWS: CPT | Mod: 26

## 2023-10-18 PROCEDURE — 72052 X-RAY EXAM NECK SPINE 6/>VWS: CPT

## 2023-10-18 PROCEDURE — 72110 X-RAY EXAM L-2 SPINE 4/>VWS: CPT

## 2023-10-18 PROCEDURE — 72110 X-RAY EXAM L-2 SPINE 4/>VWS: CPT | Mod: 26

## 2023-10-20 RX ORDER — LIDOCAINE 40 MG/G
4 PATCH TOPICAL
Qty: 30 | Refills: 0 | Status: DISCONTINUED | COMMUNITY
Start: 2023-10-19 | End: 2023-10-20

## 2023-10-20 RX ORDER — LIDOCAINE 5% 700 MG/1
5 PATCH TOPICAL
Qty: 14 | Refills: 0 | Status: ACTIVE | COMMUNITY
Start: 2023-10-20 | End: 1900-01-01

## 2023-10-27 ENCOUNTER — APPOINTMENT (OUTPATIENT)
Dept: MRI IMAGING | Facility: CLINIC | Age: 58
End: 2023-10-27
Payer: MEDICAID

## 2023-10-27 ENCOUNTER — OUTPATIENT (OUTPATIENT)
Dept: OUTPATIENT SERVICES | Facility: HOSPITAL | Age: 58
LOS: 1 days | End: 2023-10-27
Payer: MEDICAID

## 2023-10-27 DIAGNOSIS — M54.12 RADICULOPATHY, CERVICAL REGION: ICD-10-CM

## 2023-10-27 DIAGNOSIS — Z98.890 OTHER SPECIFIED POSTPROCEDURAL STATES: Chronic | ICD-10-CM

## 2023-10-27 DIAGNOSIS — Z98.89 OTHER SPECIFIED POSTPROCEDURAL STATES: Chronic | ICD-10-CM

## 2023-10-27 PROCEDURE — 72148 MRI LUMBAR SPINE W/O DYE: CPT | Mod: 26

## 2023-10-27 PROCEDURE — 72141 MRI NECK SPINE W/O DYE: CPT | Mod: 26

## 2023-10-27 PROCEDURE — 72141 MRI NECK SPINE W/O DYE: CPT

## 2023-10-27 PROCEDURE — 72148 MRI LUMBAR SPINE W/O DYE: CPT

## 2023-11-06 ENCOUNTER — APPOINTMENT (OUTPATIENT)
Dept: SPINE | Facility: CLINIC | Age: 58
End: 2023-11-06
Payer: MEDICAID

## 2023-11-06 VITALS
OXYGEN SATURATION: 98 % | HEIGHT: 74 IN | BODY MASS INDEX: 25.8 KG/M2 | WEIGHT: 201 LBS | DIASTOLIC BLOOD PRESSURE: 69 MMHG | SYSTOLIC BLOOD PRESSURE: 130 MMHG | HEART RATE: 76 BPM

## 2023-11-06 DIAGNOSIS — G89.4 CHRONIC PAIN SYNDROME: ICD-10-CM

## 2023-11-06 PROCEDURE — 99213 OFFICE O/P EST LOW 20 MIN: CPT

## 2023-11-09 ENCOUNTER — APPOINTMENT (OUTPATIENT)
Dept: GASTROENTEROLOGY | Facility: CLINIC | Age: 58
End: 2023-11-09
Payer: MEDICAID

## 2023-11-09 VITALS
OXYGEN SATURATION: 99 % | WEIGHT: 210 LBS | HEIGHT: 74 IN | SYSTOLIC BLOOD PRESSURE: 118 MMHG | HEART RATE: 68 BPM | BODY MASS INDEX: 26.95 KG/M2 | DIASTOLIC BLOOD PRESSURE: 68 MMHG | TEMPERATURE: 97 F | RESPIRATION RATE: 14 BRPM

## 2023-11-09 DIAGNOSIS — M25.551 PAIN IN RIGHT HIP: ICD-10-CM

## 2023-11-09 DIAGNOSIS — G89.29 PAIN IN RIGHT SHOULDER: ICD-10-CM

## 2023-11-09 DIAGNOSIS — M25.552 PAIN IN RIGHT HIP: ICD-10-CM

## 2023-11-09 DIAGNOSIS — M25.511 PAIN IN RIGHT SHOULDER: ICD-10-CM

## 2023-11-09 PROCEDURE — 99213 OFFICE O/P EST LOW 20 MIN: CPT

## 2023-11-09 RX ORDER — SUCRALFATE 1 G/1
1 TABLET ORAL
Qty: 60 | Refills: 6 | Status: ACTIVE | COMMUNITY
Start: 2023-11-09 | End: 1900-01-01

## 2023-12-06 ENCOUNTER — APPOINTMENT (OUTPATIENT)
Dept: NEUROLOGY | Facility: CLINIC | Age: 58
End: 2023-12-06
Payer: MEDICAID

## 2023-12-06 DIAGNOSIS — R56.9 UNSPECIFIED CONVULSIONS: ICD-10-CM

## 2023-12-06 PROCEDURE — 99213 OFFICE O/P EST LOW 20 MIN: CPT

## 2023-12-15 ENCOUNTER — EMERGENCY (EMERGENCY)
Facility: HOSPITAL | Age: 58
LOS: 1 days | Discharge: HOME | End: 2023-12-15
Attending: EMERGENCY MEDICINE
Payer: MEDICAID

## 2023-12-15 VITALS
TEMPERATURE: 100 F | OXYGEN SATURATION: 98 % | HEART RATE: 100 BPM | DIASTOLIC BLOOD PRESSURE: 85 MMHG | SYSTOLIC BLOOD PRESSURE: 134 MMHG | RESPIRATION RATE: 18 BRPM

## 2023-12-15 VITALS
WEIGHT: 214.95 LBS | RESPIRATION RATE: 18 BRPM | OXYGEN SATURATION: 97 % | HEART RATE: 128 BPM | DIASTOLIC BLOOD PRESSURE: 82 MMHG | SYSTOLIC BLOOD PRESSURE: 143 MMHG | TEMPERATURE: 100 F | HEIGHT: 74 IN

## 2023-12-15 DIAGNOSIS — Z98.890 OTHER SPECIFIED POSTPROCEDURAL STATES: Chronic | ICD-10-CM

## 2023-12-15 DIAGNOSIS — Z98.89 OTHER SPECIFIED POSTPROCEDURAL STATES: Chronic | ICD-10-CM

## 2023-12-15 LAB
ALBUMIN SERPL ELPH-MCNC: 4.6 G/DL — SIGNIFICANT CHANGE UP (ref 3.3–5)
ALBUMIN SERPL ELPH-MCNC: 4.6 G/DL — SIGNIFICANT CHANGE UP (ref 3.3–5)
ALP SERPL-CCNC: 55 U/L — SIGNIFICANT CHANGE UP (ref 40–120)
ALP SERPL-CCNC: 55 U/L — SIGNIFICANT CHANGE UP (ref 40–120)
ALT FLD-CCNC: 47 U/L — HIGH (ref 10–45)
ALT FLD-CCNC: 47 U/L — HIGH (ref 10–45)
ANION GAP SERPL CALC-SCNC: 14 MMOL/L — SIGNIFICANT CHANGE UP (ref 5–17)
ANION GAP SERPL CALC-SCNC: 14 MMOL/L — SIGNIFICANT CHANGE UP (ref 5–17)
AST SERPL-CCNC: 34 U/L — SIGNIFICANT CHANGE UP (ref 10–40)
AST SERPL-CCNC: 34 U/L — SIGNIFICANT CHANGE UP (ref 10–40)
BASE EXCESS BLDV CALC-SCNC: 2.7 MMOL/L — SIGNIFICANT CHANGE UP (ref -2–3)
BASE EXCESS BLDV CALC-SCNC: 2.7 MMOL/L — SIGNIFICANT CHANGE UP (ref -2–3)
BASOPHILS # BLD AUTO: 0.04 K/UL — SIGNIFICANT CHANGE UP (ref 0–0.2)
BASOPHILS # BLD AUTO: 0.04 K/UL — SIGNIFICANT CHANGE UP (ref 0–0.2)
BASOPHILS NFR BLD AUTO: 0.6 % — SIGNIFICANT CHANGE UP (ref 0–2)
BASOPHILS NFR BLD AUTO: 0.6 % — SIGNIFICANT CHANGE UP (ref 0–2)
BILIRUB SERPL-MCNC: 0.4 MG/DL — SIGNIFICANT CHANGE UP (ref 0.2–1.2)
BILIRUB SERPL-MCNC: 0.4 MG/DL — SIGNIFICANT CHANGE UP (ref 0.2–1.2)
BUN SERPL-MCNC: 19 MG/DL — SIGNIFICANT CHANGE UP (ref 7–23)
BUN SERPL-MCNC: 19 MG/DL — SIGNIFICANT CHANGE UP (ref 7–23)
CA-I SERPL-SCNC: 1.25 MMOL/L — SIGNIFICANT CHANGE UP (ref 1.15–1.33)
CA-I SERPL-SCNC: 1.25 MMOL/L — SIGNIFICANT CHANGE UP (ref 1.15–1.33)
CALCIUM SERPL-MCNC: 9.8 MG/DL — SIGNIFICANT CHANGE UP (ref 8.4–10.5)
CALCIUM SERPL-MCNC: 9.8 MG/DL — SIGNIFICANT CHANGE UP (ref 8.4–10.5)
CHLORIDE BLDV-SCNC: 100 MMOL/L — SIGNIFICANT CHANGE UP (ref 96–108)
CHLORIDE BLDV-SCNC: 100 MMOL/L — SIGNIFICANT CHANGE UP (ref 96–108)
CHLORIDE SERPL-SCNC: 101 MMOL/L — SIGNIFICANT CHANGE UP (ref 96–108)
CHLORIDE SERPL-SCNC: 101 MMOL/L — SIGNIFICANT CHANGE UP (ref 96–108)
CO2 BLDV-SCNC: 27 MMOL/L — HIGH (ref 22–26)
CO2 BLDV-SCNC: 27 MMOL/L — HIGH (ref 22–26)
CO2 SERPL-SCNC: 24 MMOL/L — SIGNIFICANT CHANGE UP (ref 22–31)
CO2 SERPL-SCNC: 24 MMOL/L — SIGNIFICANT CHANGE UP (ref 22–31)
CREAT SERPL-MCNC: 1.02 MG/DL — SIGNIFICANT CHANGE UP (ref 0.5–1.3)
CREAT SERPL-MCNC: 1.02 MG/DL — SIGNIFICANT CHANGE UP (ref 0.5–1.3)
EGFR: 85 ML/MIN/1.73M2 — SIGNIFICANT CHANGE UP
EGFR: 85 ML/MIN/1.73M2 — SIGNIFICANT CHANGE UP
EOSINOPHIL # BLD AUTO: 0.01 K/UL — SIGNIFICANT CHANGE UP (ref 0–0.5)
EOSINOPHIL # BLD AUTO: 0.01 K/UL — SIGNIFICANT CHANGE UP (ref 0–0.5)
EOSINOPHIL NFR BLD AUTO: 0.2 % — SIGNIFICANT CHANGE UP (ref 0–6)
EOSINOPHIL NFR BLD AUTO: 0.2 % — SIGNIFICANT CHANGE UP (ref 0–6)
FLUAV AG NPH QL: SIGNIFICANT CHANGE UP
FLUAV AG NPH QL: SIGNIFICANT CHANGE UP
FLUBV AG NPH QL: SIGNIFICANT CHANGE UP
FLUBV AG NPH QL: SIGNIFICANT CHANGE UP
GAS PNL BLDV: 136 MMOL/L — SIGNIFICANT CHANGE UP (ref 136–145)
GAS PNL BLDV: 136 MMOL/L — SIGNIFICANT CHANGE UP (ref 136–145)
GAS PNL BLDV: SIGNIFICANT CHANGE UP
GLUCOSE BLDV-MCNC: 151 MG/DL — HIGH (ref 70–99)
GLUCOSE BLDV-MCNC: 151 MG/DL — HIGH (ref 70–99)
GLUCOSE SERPL-MCNC: 147 MG/DL — HIGH (ref 70–99)
GLUCOSE SERPL-MCNC: 147 MG/DL — HIGH (ref 70–99)
HCO3 BLDV-SCNC: 26 MMOL/L — SIGNIFICANT CHANGE UP (ref 22–29)
HCO3 BLDV-SCNC: 26 MMOL/L — SIGNIFICANT CHANGE UP (ref 22–29)
HCT VFR BLD CALC: 38.8 % — LOW (ref 39–50)
HCT VFR BLD CALC: 38.8 % — LOW (ref 39–50)
HCT VFR BLDA CALC: 41 % — SIGNIFICANT CHANGE UP (ref 39–51)
HCT VFR BLDA CALC: 41 % — SIGNIFICANT CHANGE UP (ref 39–51)
HGB BLD CALC-MCNC: 13.8 G/DL — SIGNIFICANT CHANGE UP (ref 12.6–17.4)
HGB BLD CALC-MCNC: 13.8 G/DL — SIGNIFICANT CHANGE UP (ref 12.6–17.4)
HGB BLD-MCNC: 13.6 G/DL — SIGNIFICANT CHANGE UP (ref 13–17)
HGB BLD-MCNC: 13.6 G/DL — SIGNIFICANT CHANGE UP (ref 13–17)
IMM GRANULOCYTES NFR BLD AUTO: 0.3 % — SIGNIFICANT CHANGE UP (ref 0–0.9)
IMM GRANULOCYTES NFR BLD AUTO: 0.3 % — SIGNIFICANT CHANGE UP (ref 0–0.9)
LACTATE BLDV-MCNC: 2 MMOL/L — SIGNIFICANT CHANGE UP (ref 0.5–2)
LACTATE BLDV-MCNC: 2 MMOL/L — SIGNIFICANT CHANGE UP (ref 0.5–2)
LYMPHOCYTES # BLD AUTO: 0.93 K/UL — LOW (ref 1–3.3)
LYMPHOCYTES # BLD AUTO: 0.93 K/UL — LOW (ref 1–3.3)
LYMPHOCYTES # BLD AUTO: 14.2 % — SIGNIFICANT CHANGE UP (ref 13–44)
LYMPHOCYTES # BLD AUTO: 14.2 % — SIGNIFICANT CHANGE UP (ref 13–44)
MCHC RBC-ENTMCNC: 31.1 PG — SIGNIFICANT CHANGE UP (ref 27–34)
MCHC RBC-ENTMCNC: 31.1 PG — SIGNIFICANT CHANGE UP (ref 27–34)
MCHC RBC-ENTMCNC: 35.1 GM/DL — SIGNIFICANT CHANGE UP (ref 32–36)
MCHC RBC-ENTMCNC: 35.1 GM/DL — SIGNIFICANT CHANGE UP (ref 32–36)
MCV RBC AUTO: 88.6 FL — SIGNIFICANT CHANGE UP (ref 80–100)
MCV RBC AUTO: 88.6 FL — SIGNIFICANT CHANGE UP (ref 80–100)
MONOCYTES # BLD AUTO: 0.96 K/UL — HIGH (ref 0–0.9)
MONOCYTES # BLD AUTO: 0.96 K/UL — HIGH (ref 0–0.9)
MONOCYTES NFR BLD AUTO: 14.7 % — HIGH (ref 2–14)
MONOCYTES NFR BLD AUTO: 14.7 % — HIGH (ref 2–14)
NEUTROPHILS # BLD AUTO: 4.58 K/UL — SIGNIFICANT CHANGE UP (ref 1.8–7.4)
NEUTROPHILS # BLD AUTO: 4.58 K/UL — SIGNIFICANT CHANGE UP (ref 1.8–7.4)
NEUTROPHILS NFR BLD AUTO: 70 % — SIGNIFICANT CHANGE UP (ref 43–77)
NEUTROPHILS NFR BLD AUTO: 70 % — SIGNIFICANT CHANGE UP (ref 43–77)
NRBC # BLD: 0 /100 WBCS — SIGNIFICANT CHANGE UP (ref 0–0)
NRBC # BLD: 0 /100 WBCS — SIGNIFICANT CHANGE UP (ref 0–0)
PCO2 BLDV: 36 MMHG — LOW (ref 42–55)
PCO2 BLDV: 36 MMHG — LOW (ref 42–55)
PH BLDV: 7.47 — HIGH (ref 7.32–7.43)
PH BLDV: 7.47 — HIGH (ref 7.32–7.43)
PLATELET # BLD AUTO: 159 K/UL — SIGNIFICANT CHANGE UP (ref 150–400)
PLATELET # BLD AUTO: 159 K/UL — SIGNIFICANT CHANGE UP (ref 150–400)
PO2 BLDV: 36 MMHG — SIGNIFICANT CHANGE UP (ref 25–45)
PO2 BLDV: 36 MMHG — SIGNIFICANT CHANGE UP (ref 25–45)
POTASSIUM BLDV-SCNC: 3.9 MMOL/L — SIGNIFICANT CHANGE UP (ref 3.5–5.1)
POTASSIUM BLDV-SCNC: 3.9 MMOL/L — SIGNIFICANT CHANGE UP (ref 3.5–5.1)
POTASSIUM SERPL-MCNC: 3.8 MMOL/L — SIGNIFICANT CHANGE UP (ref 3.5–5.3)
POTASSIUM SERPL-MCNC: 3.8 MMOL/L — SIGNIFICANT CHANGE UP (ref 3.5–5.3)
POTASSIUM SERPL-SCNC: 3.8 MMOL/L — SIGNIFICANT CHANGE UP (ref 3.5–5.3)
POTASSIUM SERPL-SCNC: 3.8 MMOL/L — SIGNIFICANT CHANGE UP (ref 3.5–5.3)
PROT SERPL-MCNC: 7.2 G/DL — SIGNIFICANT CHANGE UP (ref 6–8.3)
PROT SERPL-MCNC: 7.2 G/DL — SIGNIFICANT CHANGE UP (ref 6–8.3)
RBC # BLD: 4.38 M/UL — SIGNIFICANT CHANGE UP (ref 4.2–5.8)
RBC # BLD: 4.38 M/UL — SIGNIFICANT CHANGE UP (ref 4.2–5.8)
RBC # FLD: 12.6 % — SIGNIFICANT CHANGE UP (ref 10.3–14.5)
RBC # FLD: 12.6 % — SIGNIFICANT CHANGE UP (ref 10.3–14.5)
RSV RNA NPH QL NAA+NON-PROBE: SIGNIFICANT CHANGE UP
RSV RNA NPH QL NAA+NON-PROBE: SIGNIFICANT CHANGE UP
SAO2 % BLDV: 64.4 % — LOW (ref 67–88)
SAO2 % BLDV: 64.4 % — LOW (ref 67–88)
SARS-COV-2 RNA SPEC QL NAA+PROBE: DETECTED
SARS-COV-2 RNA SPEC QL NAA+PROBE: DETECTED
SODIUM SERPL-SCNC: 139 MMOL/L — SIGNIFICANT CHANGE UP (ref 135–145)
SODIUM SERPL-SCNC: 139 MMOL/L — SIGNIFICANT CHANGE UP (ref 135–145)
WBC # BLD: 6.54 K/UL — SIGNIFICANT CHANGE UP (ref 3.8–10.5)
WBC # BLD: 6.54 K/UL — SIGNIFICANT CHANGE UP (ref 3.8–10.5)
WBC # FLD AUTO: 6.54 K/UL — SIGNIFICANT CHANGE UP (ref 3.8–10.5)
WBC # FLD AUTO: 6.54 K/UL — SIGNIFICANT CHANGE UP (ref 3.8–10.5)

## 2023-12-15 PROCEDURE — 82947 ASSAY GLUCOSE BLOOD QUANT: CPT

## 2023-12-15 PROCEDURE — 85025 COMPLETE CBC W/AUTO DIFF WBC: CPT

## 2023-12-15 PROCEDURE — 93005 ELECTROCARDIOGRAM TRACING: CPT

## 2023-12-15 PROCEDURE — 83605 ASSAY OF LACTIC ACID: CPT

## 2023-12-15 PROCEDURE — 84132 ASSAY OF SERUM POTASSIUM: CPT

## 2023-12-15 PROCEDURE — 85018 HEMOGLOBIN: CPT

## 2023-12-15 PROCEDURE — 87637 SARSCOV2&INF A&B&RSV AMP PRB: CPT

## 2023-12-15 PROCEDURE — 82435 ASSAY OF BLOOD CHLORIDE: CPT

## 2023-12-15 PROCEDURE — 84295 ASSAY OF SERUM SODIUM: CPT

## 2023-12-15 PROCEDURE — 96375 TX/PRO/DX INJ NEW DRUG ADDON: CPT

## 2023-12-15 PROCEDURE — 72125 CT NECK SPINE W/O DYE: CPT | Mod: 26,MA

## 2023-12-15 PROCEDURE — 96374 THER/PROPH/DIAG INJ IV PUSH: CPT

## 2023-12-15 PROCEDURE — 80053 COMPREHEN METABOLIC PANEL: CPT

## 2023-12-15 PROCEDURE — 70450 CT HEAD/BRAIN W/O DYE: CPT | Mod: 26,MA

## 2023-12-15 PROCEDURE — 82330 ASSAY OF CALCIUM: CPT

## 2023-12-15 PROCEDURE — 82803 BLOOD GASES ANY COMBINATION: CPT

## 2023-12-15 PROCEDURE — 99285 EMERGENCY DEPT VISIT HI MDM: CPT | Mod: 25

## 2023-12-15 PROCEDURE — 99285 EMERGENCY DEPT VISIT HI MDM: CPT

## 2023-12-15 PROCEDURE — 72125 CT NECK SPINE W/O DYE: CPT | Mod: MA

## 2023-12-15 PROCEDURE — 70450 CT HEAD/BRAIN W/O DYE: CPT | Mod: MA

## 2023-12-15 PROCEDURE — 85014 HEMATOCRIT: CPT

## 2023-12-15 PROCEDURE — 82962 GLUCOSE BLOOD TEST: CPT

## 2023-12-15 RX ORDER — ACETAMINOPHEN 500 MG
1000 TABLET ORAL ONCE
Refills: 0 | Status: COMPLETED | OUTPATIENT
Start: 2023-12-15 | End: 2023-12-15

## 2023-12-15 RX ORDER — LAMOTRIGINE 25 MG/1
200 TABLET, ORALLY DISINTEGRATING ORAL ONCE
Refills: 0 | Status: COMPLETED | OUTPATIENT
Start: 2023-12-15 | End: 2023-12-15

## 2023-12-15 RX ORDER — METOCLOPRAMIDE HCL 10 MG
10 TABLET ORAL ONCE
Refills: 0 | Status: COMPLETED | OUTPATIENT
Start: 2023-12-15 | End: 2023-12-15

## 2023-12-15 RX ORDER — SODIUM CHLORIDE 9 MG/ML
1000 INJECTION INTRAMUSCULAR; INTRAVENOUS; SUBCUTANEOUS ONCE
Refills: 0 | Status: COMPLETED | OUTPATIENT
Start: 2023-12-15 | End: 2023-12-15

## 2023-12-15 RX ORDER — KETOROLAC TROMETHAMINE 30 MG/ML
15 SYRINGE (ML) INJECTION ONCE
Refills: 0 | Status: DISCONTINUED | OUTPATIENT
Start: 2023-12-15 | End: 2023-12-15

## 2023-12-15 RX ORDER — LAMOTRIGINE 25 MG/1
25 TABLET, ORALLY DISINTEGRATING ORAL ONCE
Refills: 0 | Status: COMPLETED | OUTPATIENT
Start: 2023-12-15 | End: 2023-12-15

## 2023-12-15 RX ADMIN — LAMOTRIGINE 25 MILLIGRAM(S): 25 TABLET, ORALLY DISINTEGRATING ORAL at 22:44

## 2023-12-15 RX ADMIN — Medication 400 MILLIGRAM(S): at 20:38

## 2023-12-15 RX ADMIN — Medication 10 MILLIGRAM(S): at 21:50

## 2023-12-15 RX ADMIN — SODIUM CHLORIDE 1000 MILLILITER(S): 9 INJECTION INTRAMUSCULAR; INTRAVENOUS; SUBCUTANEOUS at 20:38

## 2023-12-15 RX ADMIN — Medication 15 MILLIGRAM(S): at 22:41

## 2023-12-15 RX ADMIN — LAMOTRIGINE 200 MILLIGRAM(S): 25 TABLET, ORALLY DISINTEGRATING ORAL at 22:44

## 2023-12-15 NOTE — ED PROVIDER NOTE - PATIENT PORTAL LINK FT
You can access the FollowMyHealth Patient Portal offered by [facility] by registering at the following website: [email or web address]. By joining [employer]’s FollowMyHealth portal, you will also be able to view your health information using other applications (apps) compatible with our system. You can access the FollowMyHealth Patient Portal offered by [facility] by registering at the following website: [email or web address]. By joining [employer]’s FollowMyHealth portal, you will also be able to view your health information using other applications (apps) compatible with our system.

## 2023-12-15 NOTE — ED PROVIDER NOTE - CLINICAL SUMMARY MEDICAL DECISION MAKING FREE TEXT BOX
Ana Mendez MD  58-year-old male with past medical history of cervical spine fusion presents to the emergency department with approximately 1 week history of headache that he describes as starting from the neck and going up to the scalp, associated with nausea.  No other no other associated symptoms such as weakness or tingling. on exam, no tenderness over c spine, no lesions, concern for headache related to radiculopathy, r/o intracranial pathology; PLan: CT head and cervical, labs, EKG, reassess.

## 2023-12-15 NOTE — ED PROVIDER NOTE - NSFOLLOWUPINSTRUCTIONS_ED_ALL_ED_FT
1. Return to ED for worsening, progressive or any other concerning symptoms   2. Follow up with your primary care doctor in 2-3days    1. You were seen in the ED and underwent testing for the novel coronarvirus (COVID-19). the test is  positive.    2. YOU MUST SELF-QUARANTINE      4. Return to the ED for difficulty breathing.    5. You may over the counter acetaminophen  as needed for fever or pain. There is some concern in the medical community about using ibuprofen (Advil, Motrin) and other NSAIDs in people with COVID infections and until there is more research on this subject it may be best to avoid NSAIDs like ibuprofen at the moment unless you have an allergy to acetaminophen (Tylenol).    Please do not take these medications if you do not have pain or fever or if you have any history of liver disease.

## 2023-12-15 NOTE — ED PROVIDER NOTE - CARE PLAN
1 Principal Discharge DX:	2019 novel coronavirus disease (COVID-19)  Secondary Diagnosis:	Cervical strain

## 2023-12-15 NOTE — ED PROVIDER NOTE - ATTENDING CONTRIBUTION TO CARE
Consent: The patient's consent was obtained including but not limited to risks of crusting, scabbing, blistering, scarring, darker or lighter pigmentary change, recurrence, incomplete removal and infection. I performed a history and physical exam of the patient and discussed their management with the resident. I reviewed the resident's note and agree with the documented findings and plan of care.  Ana Mendez MD

## 2023-12-15 NOTE — ED ADULT NURSE NOTE - NSFALLUNIVINTERV_ED_ALL_ED
Bed/Stretcher in lowest position, wheels locked, appropriate side rails in place/Call bell, personal items and telephone in reach/Instruct patient to call for assistance before getting out of bed/chair/stretcher/Non-slip footwear applied when patient is off stretcher/[place] to call system/Physically safe environment - no spills, clutter or unnecessary equipment/Purposeful proactive rounding/Room/bathroom lighting operational, light cord in reach Bed/Stretcher in lowest position, wheels locked, appropriate side rails in place/Call bell, personal items and telephone in reach/Instruct patient to call for assistance before getting out of bed/chair/stretcher/Non-slip footwear applied when patient is off stretcher/[place] to call system/Physically safe environment - no spills, clutter or unnecessary equipment/Purposeful proactive rounding/Room/bathroom lighting operational, light cord in reach

## 2023-12-15 NOTE — ED ADULT NURSE NOTE - OBJECTIVE STATEMENT
57YO Male with pmhx C/S fusion, DM2, HTN comes to the ED with c/o 1 week history of HA describes as starting from neck and up to the top of the ED, with several months of HA, nausea and vomiting. Pt has decrease PO intake unable to keep food down. Pt is A&Ox4, respirations are even and nonlabored, radial pluses are strong and equal pt has a brace on right arm for wrist pain. Pt denies fevers, visual changes, numbness or tingling. Pt placed in position of comfort. Pt educated on call bell system and provided call bell. Bed in lowest position, wheels locked, appropriate side rails raised. Pt denies needs at this time. 59YO Male with pmhx C/S fusion, DM2, HTN comes to the ED with c/o 1 week history of HA describes as starting from neck and up to the top of the ED, with several months of HA, nausea and vomiting. Pt has decrease PO intake unable to keep food down. Pt is A&Ox4, respirations are even and nonlabored, radial pluses are strong and equal pt has a brace on right arm for wrist pain. Pt denies fevers, visual changes, numbness or tingling. Pt placed in position of comfort. Pt educated on call bell system and provided call bell. Bed in lowest position, wheels locked, appropriate side rails raised. Pt denies needs at this time.

## 2023-12-15 NOTE — ED PROVIDER NOTE - NSCAREINITIATED _GEN_ER
Contacted the lab at [facility] to have the C. Diff & H. Pylori tests added to the already collected stool on 05/18/17. A stool culture was already in process.     
Ana Mendez(Attending)
Detail Level: Detailed

## 2023-12-15 NOTE — ED PROVIDER NOTE - PROGRESS NOTE DETAILS
Tony Long,  (PGY-1) Patient reevaluated at bedside. Patient is doing well, states his HA is still there only mildly improved. CT head negative for ICH or intracranial pathology. Chronic degenerative changes in cervical spine. COVID+. Patient informed of results, likely cause of his HA and worsening of his chronic pain and vomiting. Will attempt pain control and reassess. Anticipating DC with spine surgery f/u.

## 2023-12-15 NOTE — ED PROVIDER NOTE - OBJECTIVE STATEMENT
Ana Mendez MD  58-year-old male with past medical history of cervical spine fusion presents to the emergency department with approximately 1 week history of headache that he describes as starting from the neck and going up to the scalp, associated with nausea.  No other no other associated symptoms such as weakness or tingling. Ana Mendez MD  58-year-old male with past medical history of cervical spine fusion presents to the emergency department with approximately 1 week history of headache that he describes as starting from the neck and going up to the scalp, associated with nausea.  No other no other associated symptoms such as weakness or tingling.  Tony Long DO (PGY-1)   58-year-old male past medical history cervical and lumbar spine surgeries/fusions, DM2 on metformin, seizures on Keppra, hypertension on amlodipine and atenolol presents complaining of several months of headache and 2 months of nausea/vomiting.  He states for the nausea and vomiting he saw a doctor who gave him omeprazole and sucralfate which did not seem to help.  Whenever he vomits he dry heaves which causes worsening of his neck and back pain.  He describes the headache as pain going from his neck up into his bilateral scalp and temples which is worse with movement.  He states that he is coming into the emergency room because the headache is the worst it has ever been over the past 2 days.  Denies changes in visual acuity or other visual changes, denies numbness, tingling, saddle anesthesia, bladder or bowel incontinence, chest pain, shortness of breath, fevers, chills.

## 2023-12-18 ENCOUNTER — APPOINTMENT (OUTPATIENT)
Dept: ORTHOPEDIC SURGERY | Facility: CLINIC | Age: 58
End: 2023-12-18

## 2024-01-04 ENCOUNTER — APPOINTMENT (OUTPATIENT)
Dept: GASTROENTEROLOGY | Facility: CLINIC | Age: 59
End: 2024-01-04
Payer: MEDICAID

## 2024-01-04 VITALS
HEART RATE: 84 BPM | SYSTOLIC BLOOD PRESSURE: 116 MMHG | OXYGEN SATURATION: 99 % | BODY MASS INDEX: 27.59 KG/M2 | HEIGHT: 74 IN | TEMPERATURE: 98 F | WEIGHT: 215 LBS | DIASTOLIC BLOOD PRESSURE: 64 MMHG | RESPIRATION RATE: 14 BRPM

## 2024-01-04 DIAGNOSIS — F41.9 ANXIETY DISORDER, UNSPECIFIED: ICD-10-CM

## 2024-01-04 DIAGNOSIS — K21.9 GASTRO-ESOPHAGEAL REFLUX DISEASE W/OUT ESOPHAGITIS: ICD-10-CM

## 2024-01-04 PROCEDURE — 99213 OFFICE O/P EST LOW 20 MIN: CPT

## 2024-01-04 NOTE — CONSULT LETTER
[Dear  ___] : Dear  [unfilled], [Courtesy Letter:] : I had the pleasure of seeing your patient, [unfilled], in my office today. [Please see my note below.] : Please see my note below. [Consult Closing:] : Thank you very much for allowing me to participate in the care of this patient.  If you have any questions, please do not hesitate to contact me. [Sincerely,] : Sincerely, [FreeTextEntry3] : Pipo Ervin MD, FACP, AGAF, FAASLD\par   of Medicine\par  [facility]\par

## 2024-01-04 NOTE — ASSESSMENT
[FreeTextEntry1] : 58-year-old male with previous admission to [facility] with pancreatitis.  His lipase was less than 100.  However, he had waited more than a week before he made his way to the hospital and complained of some nausea and abdominal discomfort.  Abdominal ultrasound did not show any gallstones the CAT scan of the abdomen and pelvis showed a normal pancreas.  Incidentally noted was bursitis involving the left hip.  Since that time he is adhering to a low-fat diet fairly stringently.  He feels better but still has some bloating.  He is concerned because of his maternal grandmother's history of pancreatic cancer.  Has any alcohol usage.  Hospital records and imaging reports were reviewed with the patient.  RTO 1/4/24- feels well, no gerd, no n/v.Has no constipation. IMP: 1. gerd, dyspepsia 2. Neck pain 3. HA  PLAN: 1.maintain current evaluation 2. neuro followup 3. RTO 6 months

## 2024-01-04 NOTE — PHYSICAL EXAM
[General Appearance - Alert] : alert [General Appearance - In No Acute Distress] : in no acute distress [PERRL With Normal Accommodation] : pupils were equal in size, round, and reactive to light [Extraocular Movements] : extraocular movements were intact [Outer Ear] : the ears and nose were normal in appearance [Neck Appearance] : the appearance of the neck was normal [Neck Cervical Mass (___cm)] : no neck mass was observed [Jugular Venous Distention Increased] : there was no jugular-venous distention [Thyroid Diffuse Enlargement] : the thyroid was not enlarged [Thyroid Nodule] : there were no palpable thyroid nodules [Heart Sounds] : normal S1 and S2 [Heart Sounds Gallop] : no gallops [Heart Sounds Pericardial Friction Rub] : no pericardial rub [Full Pulse] : the pedal pulses are present [Edema] : there was no peripheral edema [Abdomen Mass (___ Cm)] : no abdominal mass palpated [Cervical Lymph Nodes Enlarged Posterior Bilaterally] : posterior cervical [Cervical Lymph Nodes Enlarged Anterior Bilaterally] : anterior cervical [Supraclavicular Lymph Nodes Enlarged Bilaterally] : supraclavicular [Axillary Lymph Nodes Enlarged Bilaterally] : axillary [Femoral Lymph Nodes Enlarged Bilaterally] : femoral [Inguinal Lymph Nodes Enlarged Bilaterally] : inguinal [No CVA Tenderness] : no ~M costovertebral angle tenderness [No Spinal Tenderness] : no spinal tenderness [Abnormal Walk] : normal gait [Nail Clubbing] : no clubbing  or cyanosis of the fingernails [Musculoskeletal - Swelling] : no joint swelling seen [Motor Tone] : muscle strength and tone were normal [Skin Color & Pigmentation] : normal skin color and pigmentation [Skin Turgor] : normal skin turgor [Deep Tendon Reflexes (DTR)] : deep tendon reflexes were 2+ and symmetric [Sensation] : the sensory exam was normal to light touch and pinprick [No Focal Deficits] : no focal deficits [Impaired Insight] : insight and judgment were intact [Affect] : the affect was normal [Alert] : alert [Normal Voice/Communication] : normal voice/communication [Healthy Appearing] : healthy appearing [No Acute Distress] : no acute distress [Sclera] : the sclera and conjunctiva were normal [Hearing Threshold Finger Rub Not Hood] : hearing was normal [Normal Lips/Gums] : the lips and gums were normal [Oropharynx] : the oropharynx was normal [Normal Appearance] : the appearance of the neck was normal [No Neck Mass] : no neck mass was observed [No Respiratory Distress] : no respiratory distress [No Acc Muscle Use] : no accessory muscle use [Respiration, Rhythm And Depth] : normal respiratory rhythm and effort [Auscultation Breath Sounds / Voice Sounds] : lungs were clear to auscultation bilaterally [Heart Rate And Rhythm] : heart rate was normal and rhythm regular [Normal S1, S2] : normal S1 and S2 [Murmurs] : no murmurs [Bowel Sounds] : normal bowel sounds [Abdomen Tenderness] : non-tender [No Masses] : no abdominal mass palpated [Abdomen Soft] : soft [] : no hepatosplenomegaly [Oriented To Time, Place, And Person] : oriented to person, place, and time

## 2024-01-04 NOTE — HISTORY OF PRESENT ILLNESS
[___ Month(s) Ago] : [unfilled] month(s) ago [None] : no changes [Hospitalization] : was hospitalized [Heartburn] : denies heartburn [Nausea] : stable nausea [Vomiting] : denies vomiting [Diarrhea] : denies diarrhea [Constipation] : denies constipation [Yellow Skin Or Eyes (Jaundice)] : denies jaundice [Abdominal Pain] : denies abdominal pain [Abdominal Swelling] : denies abdominal swelling [Rectal Pain] : denies rectal pain [Wt Gain ___ Lbs] : no recent weight gain [GERD] : no gastroesophageal reflux disease [Hiatus Hernia] : no hiatus hernia [Peptic Ulcer Disease] : no peptic ulcer disease [Pancreatitis] : pancreatitis [Cholelithiasis] : no cholelithiasis [Kidney Stone] : no kidney stone [Inflammatory Bowel Disease] : no inflammatory bowel disease [Irritable Bowel Syndrome] : no irritable bowel syndrome [Diverticulitis] : no diverticulitis [Alcohol Abuse] : no alcohol abuse [Malignancy] : no malignancy [Abdominal Surgery] : no abdominal surgery [Appendectomy] : no appendectomy [Cholecystectomy] : no cholecystectomy [_________] : Performed [unfilled] [Good Compliance] : good compliance with treatment [Good Tolerance] : good tolerance of treatment [Good Symptom Control] : good symptom control [de-identified] : pancreatitis [de-identified] : normal [FreeTextEntry1] : 2021 normal

## 2024-01-04 NOTE — REVIEW OF SYSTEMS
[Limb Pain] : limb pain [FreeTextEntry7] : bloating [FreeTextEntry9] : left hip pain [Recent Weight Gain (___ Lbs)] : recent [unfilled] ~Ulb weight gain [Vomiting] : vomiting [As Noted in HPI] : as noted in HPI [Negative] : Heme/Lymph

## 2024-01-31 ENCOUNTER — APPOINTMENT (OUTPATIENT)
Dept: NEUROLOGY | Facility: CLINIC | Age: 59
End: 2024-01-31

## 2024-03-08 RX ORDER — OMEPRAZOLE 40 MG/1
40 CAPSULE, DELAYED RELEASE ORAL
Qty: 90 | Refills: 3 | Status: ACTIVE | COMMUNITY
Start: 2023-09-28 | End: 1900-01-01

## 2024-04-08 ENCOUNTER — APPOINTMENT (OUTPATIENT)
Dept: ORTHOPEDIC SURGERY | Facility: CLINIC | Age: 59
End: 2024-04-08

## 2024-06-03 ENCOUNTER — APPOINTMENT (OUTPATIENT)
Dept: ORTHOPEDIC SURGERY | Facility: CLINIC | Age: 59
End: 2024-06-03
Payer: MEDICAID

## 2024-06-03 VITALS
DIASTOLIC BLOOD PRESSURE: 87 MMHG | HEART RATE: 82 BPM | OXYGEN SATURATION: 93 % | SYSTOLIC BLOOD PRESSURE: 155 MMHG | HEIGHT: 74 IN | WEIGHT: 215 LBS | RESPIRATION RATE: 16 BRPM | BODY MASS INDEX: 27.59 KG/M2

## 2024-06-03 PROCEDURE — 99213 OFFICE O/P EST LOW 20 MIN: CPT | Mod: 25

## 2024-06-03 PROCEDURE — 20611 DRAIN/INJ JOINT/BURSA W/US: CPT | Mod: LT

## 2024-06-11 ENCOUNTER — APPOINTMENT (OUTPATIENT)
Dept: NEUROLOGY | Facility: CLINIC | Age: 59
End: 2024-06-11
Payer: MEDICAID

## 2024-06-11 VITALS
HEIGHT: 74 IN | HEART RATE: 77 BPM | WEIGHT: 210 LBS | BODY MASS INDEX: 26.95 KG/M2 | DIASTOLIC BLOOD PRESSURE: 76 MMHG | SYSTOLIC BLOOD PRESSURE: 146 MMHG

## 2024-06-11 DIAGNOSIS — F06.30 MOOD DISORDER DUE TO KNOWN PHYSIOLOGICAL CONDITION, UNSPECIFIED: ICD-10-CM

## 2024-06-11 DIAGNOSIS — G40.219 LOCALIZATION-RELATED (FOCAL) (PARTIAL) SYMPTOMATIC EPILEPSY AND EPILEPTIC SYNDROMES WITH COMPLEX PARTIAL SEIZURES, INTRACTABLE, W/OUT STATUS EPILEPTICUS: ICD-10-CM

## 2024-06-11 DIAGNOSIS — M54.2 CERVICALGIA: ICD-10-CM

## 2024-06-11 DIAGNOSIS — M54.16 RADICULOPATHY, LUMBAR REGION: ICD-10-CM

## 2024-06-11 PROCEDURE — G2211 COMPLEX E/M VISIT ADD ON: CPT | Mod: NC

## 2024-06-11 PROCEDURE — 99214 OFFICE O/P EST MOD 30 MIN: CPT

## 2024-06-11 RX ORDER — GABAPENTIN 300 MG/1
300 CAPSULE ORAL
Qty: 270 | Refills: 1 | Status: ACTIVE | COMMUNITY
Start: 2024-06-11 | End: 1900-01-01

## 2024-06-11 RX ORDER — LAMOTRIGINE 25 MG/1
25 TABLET ORAL
Qty: 180 | Refills: 1 | Status: ACTIVE | COMMUNITY
Start: 2018-08-20 | End: 1900-01-01

## 2024-06-11 RX ORDER — LAMOTRIGINE 200 MG/1
200 TABLET ORAL
Qty: 180 | Refills: 1 | Status: ACTIVE | COMMUNITY
Start: 2018-12-12 | End: 1900-01-01
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
## 2024-06-11 NOTE — PHYSICAL EXAM
[General Appearance - Alert] : alert [General Appearance - In No Acute Distress] : in no acute distress [Oriented To Time, Place, And Person] : oriented to person, place, and time [Impaired Insight] : insight and judgment were intact [Affect] : the affect was normal [Person] : oriented to person [Place] : oriented to place [Time] : oriented to time [Registration Intact] : recent registration memory intact [Concentration Intact] : normal concentrating ability [Visual Intact] : visual attention was ~T not ~L decreased [Naming Objects] : no difficulty naming common objects [Repeating Phrases] : no difficulty repeating a phrase [Writing A Sentence] : no difficulty writing a sentence [Fluency] : fluency intact [Comprehension] : comprehension intact [Reading] : reading intact [Past History] : adequate knowledge of personal past history [Cranial Nerves Optic (II)] : visual acuity intact bilaterally,  visual fields full to confrontation, pupils equal round and reactive to light [Cranial Nerves Oculomotor (III)] : extraocular motion intact [Cranial Nerves Trigeminal (V)] : facial sensation intact symmetrically [Cranial Nerves Facial (VII)] : face symmetrical [Cranial Nerves Vestibulocochlear (VIII)] : hearing was intact bilaterally [Cranial Nerves Glossopharyngeal (IX)] : tongue and palate midline [Cranial Nerves Accessory (XI - Cranial And Spinal)] : head turning and shoulder shrug symmetric [Cranial Nerves Hypoglossal (XII)] : there was no tongue deviation with protrusion [Motor Tone] : muscle tone was normal in all four extremities [Motor Strength] : muscle strength was normal in all four extremities [No Muscle Atrophy] : normal bulk in all four extremities [Motor Handedness Right-Handed] : the patient is right hand dominant [Sensation Tactile Decrease] : light touch was intact [Dysesthesia] : dysesthesia was present [Abnormal Walk] : normal gait [Balance] : balance was intact [2+] : Brachioradialis left 2+ [3+] : Patella right 3+ [1+] : Ankle jerk right 1+ [0] : Ankle jerk left 0 [Sclera] : the sclera and conjunctiva were normal [Outer Ear] : the ears and nose were normal in appearance [Neck Appearance] : the appearance of the neck was normal [Apical Impulse] : the apical impulse was normal [Heart Rate And Rhythm] : heart rate was normal and rhythm regular [Abdomen Tenderness] : non-tender [Nail Clubbing] : no clubbing  or cyanosis of the fingernails [] : no rash [Skin Lesions] : no skin lesions [Past-pointing] : there was no past-pointing [Tremor] : no tremor present [Plantar Reflex Right Only] : normal on the right [Plantar Reflex Left Only] : normal on the left [FreeTextEntry4] : mood stable.  sequential numbers letters to 8H.  distractible. no apraxia. 1/3 recall [FreeTextEntry6] : CTS wrist surgery scars on RUE [FreeTextEntry7] : in fingertips bilat

## 2024-06-11 NOTE — DISCUSSION/SUMMARY
[Medically Refractory (seizure within the last year)] : Medically Refractory (seizure within the last year) [Risks Associated with Driving/[place] Law] : As per my usual protocol, the patient was advised in regards to risks and driving privileges associated with the [place] Guidelines.  [Safety Recommendations] : The patient was advised in regards to the risk of seizures and general seizure safety recommendations including not to be bathing alone, climbing to high places and operating heavy machinery. [Compliance with Medications] : The importance of compliance with medications was reinforced. [Medication Side Effects] : High frequency and serious potential medication adverse effects were reviewed with the patient, including but not exclusive to psychiatric effects.  Information sheets on medication side effects were made available to the patient in our clinic.  The patient or advocate agrees to notify us for any concerns. [Sleep Hygiene/Sleep Disruption Risks] : Sleep hygiene and the risks of sleep disruption were discussed. [Obtain Copies of Medical Records] : Patient was asked to obtain copies of pertinent prior medical records or studies [Mental Health Evaluation] : Mental health evaluation" was recommended with either our  and psychologist, at [facility] ([facility] ): [phone], or [facility] Intake: [phone] [FreeTextEntry1] : Seizures since 2007 baseline was q1-5months. 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  History of poor response to OXC, poor tolerability of VPA, PGB.   Doing much better on LTG, no seizures since 2018  Prior NL MRI. Stress, anxiety, mood issues, poor med tolerability, poor insight a factor barriers to his own care. Chronic pain issues.  Sleep deprivation, insomnia an issue. Poor appetite. Chronic memory complaints, focus and attentional issues, longstanding, nonprogressive. Maybe related to epilepsy, depression. h/o TBIs/concussions.  On LTG level 11 in 4/2/2021, on 225mg bid. (No prior vimpat, ZNS, TPM)   F/u LTG level, labs ncv  -would benefit from a psychiatrist, psychologist. prev referred to [facility] x2.  did not see anyone subsequently. pt reluctant to take any antidepresaent.  Rec Stress reduction, exercise.  -Referred to sleep specialist in past.  (tried ambien in the past) -Physical therapy, seeing PMR -spinal stenosis, f/u with Nsurg Dr Cortes. -s/p EMG for sensory disturbance of hands showed neuropathy: CTS: wear wrist guards at night. p surgery x2 -consider GBP re-trial for pain, seizures. -has resumed driving, no events since 2018  total time x31min

## 2024-06-11 NOTE — REASON FOR VISIT
Eat healthy foods you enjoy. Enoxaparin/Lovenox DOES NOT have a special diet. Limit your alcohol intake. [Follow-Up: _____] : a [unfilled] follow-up visit

## 2024-06-11 NOTE — DATA REVIEWED
[de-identified] : 2007 MRI neg\par  1/2017: MRI neg (OSH) NL\par  2/2018: MRI C spine: C3-5 C7 canal stenosis, compression [de-identified] : 2007, 2009, 2011 AEEG 48hr neg\par  The patient had ambulatory EEG monitoring in April 2016.  This has shown a left temporal seizure on the random one day recording.   [de-identified] : EMG 3/2018: CTS bilat R>L, ulnar neuropathy on the L\par  20021 LTG 11 \par  Labs:  9/22 bmp, lft, cbc  nl.  glu slighty elevated, nonfasting.

## 2024-06-11 NOTE — CONSULT LETTER
[Dear  ___] : Dear  [unfilled], [Consult Letter:] : I had the pleasure of evaluating your patient, [unfilled]. [( Thank you for referring [unfilled] for consultation for _____ )] : Thank you for referring [unfilled] for consultation for [unfilled] [Please see my note below.] : Please see my note below. [Consult Closing:] : Thank you very much for allowing me to participate in the care of this patient.  If you have any questions, please do not hesitate to contact me. [Sincerely,] : Sincerely, [Santo Boland MD] : Santo Boland MD [Attending, Dept. of Neurology, Division of Epilepsy] : Attending, Dept. of Neurology, Division of Epilepsy [[facility]] : [facility] [ of Neurology] :  of Neurology [FreeTextEntry2] : Dr. Peter Farrell\par  [address] [place] [address]

## 2024-06-12 ENCOUNTER — APPOINTMENT (OUTPATIENT)
Dept: ORTHOPEDIC SURGERY | Facility: CLINIC | Age: 59
End: 2024-06-12
Payer: MEDICAID

## 2024-06-12 VITALS
OXYGEN SATURATION: 96 % | BODY MASS INDEX: 26.95 KG/M2 | DIASTOLIC BLOOD PRESSURE: 77 MMHG | HEART RATE: 69 BPM | HEIGHT: 74 IN | WEIGHT: 210 LBS | SYSTOLIC BLOOD PRESSURE: 125 MMHG

## 2024-06-12 DIAGNOSIS — M54.9 DORSALGIA, UNSPECIFIED: ICD-10-CM

## 2024-06-12 PROCEDURE — 99214 OFFICE O/P EST MOD 30 MIN: CPT

## 2024-06-12 PROCEDURE — 72110 X-RAY EXAM L-2 SPINE 4/>VWS: CPT

## 2024-06-12 NOTE — ASSESSMENT
[FreeTextEntry1] : I had a lengthy discussion with the patient in regard to their treatment plan and diagnosis. Their symptoms have persisted despite the conservative management they have attempted thus far. I discussed with the patient various treatment options including surgery. Risks benefits discussed regarding L4-S1 fusion vs removal of screws from previous fusion. The patient will follow up with me when he comes to a decision or decides to pursue further treatment. I will have patient follow-up in 4 weeks for repeat clinical evaluation. I encouraged them to follow-up sooner if their symptoms worsen or change in any way.

## 2024-06-12 NOTE — PHYSICAL EXAM
[de-identified] : Lumbar Physical Exam   Gait - Normal  Station - Normal   Sagittal balance - Normal   Compensatory mechanism? - None   Heel walk - Normal   Toe walk - Normal     Reflexes    Patellar - normal    Gastroc - normal    Clonus - No    Hip Exam - Normal   Straight leg raise - none   Pulses - 2+ dp/pt   Range of motion - normal    Sensation   Sensation intact to light touch in L1, L2, L3, L4, L5 and S1 dermatomes bilaterally     Motor             IP Quad HS TA Gastroc EHL   Right 5/5  5/5   5/5 5/5    5/5     5/5       Left   5/5 5/5 5/5 5/5    5/5      5/5  [de-identified] : Lumbar Rads 4 views  evidence of fusion L4-L5  No evidence of motion  Facet arthropathy

## 2024-06-12 NOTE — HISTORY OF PRESENT ILLNESS
[de-identified] : Patient is a 58 year old male who presents for initial eval of LT sided lower bp. Patent details Hx of L4-L5 Surgery in 2020. Details LT lower bp beginning 6 months later. Describes the pain as a sharp stabbing sensation. Patient was informed screws from previous surgery were loose.

## 2024-06-12 NOTE — ADDENDUM
[FreeTextEntry1] :  I, Dianelys Arias, acted solely as a scribe for Dr. Marc Marcus MD on this date 06/12/2024   All medical record entries made by the Scribe were at my, Dr. Marc Marcus MD., direction and personally dictated by me on 06/12/2024. I have reviewed the chart and agree that the record accurately reflects my personal performance of the history, physical exam, assessment and plan. I have also personally directed, reviewed, and agreed with the chart.

## 2024-07-05 ENCOUNTER — APPOINTMENT (OUTPATIENT)
Dept: GASTROENTEROLOGY | Facility: CLINIC | Age: 59
End: 2024-07-05
Payer: MEDICAID

## 2024-07-05 VITALS
TEMPERATURE: 97 F | OXYGEN SATURATION: 99 % | RESPIRATION RATE: 14 BRPM | HEART RATE: 70 BPM | WEIGHT: 211 LBS | SYSTOLIC BLOOD PRESSURE: 120 MMHG | DIASTOLIC BLOOD PRESSURE: 70 MMHG | BODY MASS INDEX: 27.08 KG/M2 | HEIGHT: 74 IN

## 2024-07-05 DIAGNOSIS — M54.12 RADICULOPATHY, CERVICAL REGION: ICD-10-CM

## 2024-07-05 DIAGNOSIS — K21.9 GASTRO-ESOPHAGEAL REFLUX DISEASE W/OUT ESOPHAGITIS: ICD-10-CM

## 2024-07-05 DIAGNOSIS — F41.9 ANXIETY DISORDER, UNSPECIFIED: ICD-10-CM

## 2024-07-05 DIAGNOSIS — G89.4 CHRONIC PAIN SYNDROME: ICD-10-CM

## 2024-07-05 PROCEDURE — 99214 OFFICE O/P EST MOD 30 MIN: CPT

## 2024-07-05 RX ORDER — FAMOTIDINE 40 MG/1
40 TABLET, FILM COATED ORAL
Qty: 90 | Refills: 3 | Status: ACTIVE | COMMUNITY
Start: 2024-07-05 | End: 1900-01-01

## 2024-07-30 NOTE — ED PROVIDER NOTE - PROGRESS NOTE DETAILS
Call complete dermatology for appointment if none avalaible  1. Wart of hand  -     Ambulatory Referral to Dermatology; Future      Babita: Remains asymptomatic, labs wnl, repeat ECG unchanged with normal intervals.

## 2024-09-03 ENCOUNTER — APPOINTMENT (OUTPATIENT)
Dept: ORTHOPEDIC SURGERY | Facility: CLINIC | Age: 59
End: 2024-09-03

## 2024-09-03 VITALS
RESPIRATION RATE: 16 BRPM | DIASTOLIC BLOOD PRESSURE: 71 MMHG | WEIGHT: 215 LBS | BODY MASS INDEX: 27.6 KG/M2 | SYSTOLIC BLOOD PRESSURE: 131 MMHG | OXYGEN SATURATION: 95 % | HEART RATE: 74 BPM

## 2024-09-03 PROCEDURE — 20611 DRAIN/INJ JOINT/BURSA W/US: CPT | Mod: LT

## 2024-10-29 ENCOUNTER — APPOINTMENT (OUTPATIENT)
Dept: NEUROLOGY | Facility: CLINIC | Age: 59
End: 2024-10-29

## 2024-10-29 DIAGNOSIS — R56.9 UNSPECIFIED CONVULSIONS: ICD-10-CM

## 2024-10-29 DIAGNOSIS — R41.9 UNSPECIFIED SYMPTOMS AND SIGNS INVOLVING COGNITIVE FUNCTIONS AND AWARENESS: ICD-10-CM

## 2024-10-29 DIAGNOSIS — M54.12 RADICULOPATHY, CERVICAL REGION: ICD-10-CM

## 2024-10-29 DIAGNOSIS — G89.4 CHRONIC PAIN SYNDROME: ICD-10-CM

## 2024-12-03 ENCOUNTER — APPOINTMENT (OUTPATIENT)
Dept: ORTHOPEDIC SURGERY | Facility: CLINIC | Age: 59
End: 2024-12-03

## 2025-01-06 ENCOUNTER — APPOINTMENT (OUTPATIENT)
Dept: GASTROENTEROLOGY | Facility: CLINIC | Age: 60
End: 2025-01-06

## 2025-01-08 ENCOUNTER — APPOINTMENT (OUTPATIENT)
Dept: ORTHOPEDIC SURGERY | Facility: CLINIC | Age: 60
End: 2025-01-08
Payer: MEDICAID

## 2025-01-08 VITALS
BODY MASS INDEX: 27.59 KG/M2 | SYSTOLIC BLOOD PRESSURE: 128 MMHG | DIASTOLIC BLOOD PRESSURE: 79 MMHG | HEIGHT: 74 IN | WEIGHT: 215 LBS

## 2025-01-08 DIAGNOSIS — M54.16 RADICULOPATHY, LUMBAR REGION: ICD-10-CM

## 2025-01-08 DIAGNOSIS — M54.12 RADICULOPATHY, CERVICAL REGION: ICD-10-CM

## 2025-01-08 PROCEDURE — 99214 OFFICE O/P EST MOD 30 MIN: CPT

## 2025-02-12 ENCOUNTER — APPOINTMENT (OUTPATIENT)
Dept: ORTHOPEDIC SURGERY | Facility: CLINIC | Age: 60
End: 2025-02-12
Payer: MEDICAID

## 2025-02-12 DIAGNOSIS — M54.16 RADICULOPATHY, LUMBAR REGION: ICD-10-CM

## 2025-02-12 PROCEDURE — 99215 OFFICE O/P EST HI 40 MIN: CPT

## 2025-02-25 ENCOUNTER — APPOINTMENT (OUTPATIENT)
Dept: NEUROLOGY | Facility: CLINIC | Age: 60
End: 2025-02-25
Payer: MEDICAID

## 2025-02-25 ENCOUNTER — NON-APPOINTMENT (OUTPATIENT)
Age: 60
End: 2025-02-25

## 2025-02-25 VITALS
HEART RATE: 73 BPM | DIASTOLIC BLOOD PRESSURE: 64 MMHG | HEIGHT: 74 IN | SYSTOLIC BLOOD PRESSURE: 135 MMHG | WEIGHT: 210 LBS | BODY MASS INDEX: 26.95 KG/M2

## 2025-02-25 DIAGNOSIS — G47.00 INSOMNIA, UNSPECIFIED: ICD-10-CM

## 2025-02-25 DIAGNOSIS — G31.84 MILD COGNITIVE IMPAIRMENT, SO STATED: ICD-10-CM

## 2025-02-25 DIAGNOSIS — Z51.81 ENCOUNTER FOR THERAPEUTIC DRUG LVL MONITORING: ICD-10-CM

## 2025-02-25 DIAGNOSIS — F41.9 ANXIETY DISORDER, UNSPECIFIED: ICD-10-CM

## 2025-02-25 DIAGNOSIS — G40.219 LOCALIZATION-RELATED (FOCAL) (PARTIAL) SYMPTOMATIC EPILEPSY AND EPILEPTIC SYNDROMES WITH COMPLEX PARTIAL SEIZURES, INTRACTABLE, W/OUT STATUS EPILEPTICUS: ICD-10-CM

## 2025-02-25 PROCEDURE — 99214 OFFICE O/P EST MOD 30 MIN: CPT

## 2025-02-25 PROCEDURE — G2211 COMPLEX E/M VISIT ADD ON: CPT | Mod: NC

## 2025-02-25 NOTE — PATIENT PROFILE ADULT - MEDICATIONS/VISITS
Transition Communication Hand-off for Care Transitions to Next Level of Care Provider    Name: Tad Zaman  : 1959  MRN #: [id number]  Primary Care Provider: Julius Cesar     Primary Clinic: [place] [address]  [facility] [place] [address]     Reason for Hospitalization:  Weakness generalized [R53.1]  Altered mental status, unspecified altered mental status type [R41.82]  Admit Date/Time: 2025 11:35 AM  Discharge Date: 3/26/25  Payor Source: Payor: MEDICARE / Plan: MEDICARE / Product Type: Medicare /     Readmission Assessment Measure (MICHELE) Risk Score/category: 24%  Reason for Communication Hand-off Referral: Difficulty understanding plan of care  Discharge Plan: home w/ home care; local family support; VA support/resources  Concern for non-adherence with plan of care:   Y/N no  Discharge Needs Assessment:  Needs      Flowsheet Row Most Recent Value   Equipment Currently Used at Home cane, straight, grab bar, tub/shower, prosthesis, walker, rolling        Already enrolled in Tele-monitoring program and name of program:  no  Follow-up specialty is recommended: Yes    Follow-up plan:    Future Appointments   Date Time Provider Department Center   2025  1:15 PM Piedad Lloyd APRN [facility]   4/10/2025 11:00 AM Rodney Anthony MD [facility]       Any outstanding tests or procedures:        Referrals       Future Labs/Procedures    Adult Mental Health  Referral     Process Instructions:    WE MAKE EVERY ATTEMPT TO SCHEDULE THE PATIENT INTERNALLY; IF CAPACITY IS LIMITED, PATIENTS WILL BE OFFERED REFERRAL TO [facility]. Please see the Psychiatry, Psychological Testing, and Eating Disorders Reference Link below.    FOR SUBSTANCE USE, PLEASE REVIEW EXCLUSION CRITERIA. Please see the Substance Use Reference Link below.    DBT Clinic Referral Form must be completed when referring to the DBT program. Please see the DBT Clinic Referral Form Reference Link below.     If  needing to make more than one Mental Health Referral, the orders will need to be placed separately.     Consider Adult E-Consult to Addiction Medicine for the following conditions: alcohol use disorder (maintenance), alcohol withdrawal, opioid use disorder.  E-Consult Cost: </=$10.     Consider Adult E-Consult to Psychiatry for the following conditions: ADHD, anxiety, bipolar disorder, depression.  E-Consult Cost: </=$10.       Comments:    Please be aware that coverage of these services is subject to the terms and limitations of your health insurance plan.  Call member services at your health plan with any benefit or coverage questions.  [employer] will call you to coordinate your care as prescribed by your provider. If you don't hear from a representative within 2 business days, please call [phone].      Adult Neurology  Referral     Scheduling Instructions:    Please schedule within the next 4 weeks    Comments:    Please be aware that coverage of these services is subject to the terms and limitations of your health insurance plan.  Call member services at your health plan with any benefit or coverage questions.  [employer] will call you to coordinate your care as prescribed by your provider. If you don't hear from a representative within 2 business days, please call [phone].    Orthopedic  Referral     Process Instructions:    USE THE SPINE REFERRAL ([phone]) FOR ALL SPINE RELATED SYMPTOMS.    Consider E-Consult to Sports Medicine/Non-Surgical Orthopedics for the following conditions: ankle pain, back pain, elbow pain, epicondylitis, foot pain, hand/wrist pain, hip osteoarthritis, knee osteoarthritis, knee pain, shoulder pain, trochanteric bursitis.  E-Consult Cost: </=$10.     Scheduling Instructions:    Please schedule within the next 3 weeks    Comments:    Please be aware that coverage of these services is subject to the terms and limitations of your health  insurance plan.  Call member services at your health plan with any benefit or coverage questions.  [facility]  will call you to coordinate your care as prescribed by your provider. A representative will call you within 2 business days to help you schedule your appointment, or you may contact the  Representative at: [phone].    Primary Care Referral     Comments:    Please be aware that coverage of these services is subject to the terms and limitations of your health insurance plan.  Call member services at your health plan with any benefit or coverage questions.    Home Care Referral     Comments:    Your provider has ordered home health services. If you have not been contacted within 2 days of your discharge please call the selected Home Care agency listed on your Discharge document.  If a Home Care agency is NOT listed, please call [phone].              [facility] Recommendations:      Hung Krause RN    AVS/Discharge Summary is the source of truth; this is a helpful guide for improved communication of patient story             no